# Patient Record
Sex: MALE | Race: WHITE | NOT HISPANIC OR LATINO | Employment: FULL TIME | URBAN - METROPOLITAN AREA
[De-identification: names, ages, dates, MRNs, and addresses within clinical notes are randomized per-mention and may not be internally consistent; named-entity substitution may affect disease eponyms.]

---

## 2017-01-02 ENCOUNTER — TRANSFERRED RECORDS (OUTPATIENT)
Dept: HEALTH INFORMATION MANAGEMENT | Facility: CLINIC | Age: 42
End: 2017-01-02

## 2017-01-02 ENCOUNTER — HOSPITAL ENCOUNTER (OUTPATIENT)
Facility: CLINIC | Age: 42
Setting detail: OBSERVATION
Discharge: HOME | End: 2017-01-03
Attending: PHYSICIAN ASSISTANT | Admitting: FAMILY MEDICINE
Payer: COMMERCIAL

## 2017-01-02 DIAGNOSIS — L03.114 CELLULITIS OF LEFT ELBOW: ICD-10-CM

## 2017-01-02 DIAGNOSIS — M70.22 OLECRANON BURSITIS OF LEFT ELBOW: Primary | ICD-10-CM

## 2017-01-02 LAB
ANION GAP SERPL CALCULATED.3IONS-SCNC: 9 MMOL/L (ref 3–14)
BUN SERPL-MCNC: 12 MG/DL (ref 7–30)
CALCIUM SERPL-MCNC: 8.5 MG/DL (ref 8.5–10.1)
CHLORIDE SERPL-SCNC: 104 MMOL/L (ref 94–109)
CO2 SERPL-SCNC: 28 MMOL/L (ref 20–32)
CREAT SERPL-MCNC: 0.85 MG/DL (ref 0.66–1.25)
CRP SERPL-MCNC: 32.5 MG/L (ref 0–8)
GFR SERPL CREATININE-BSD FRML MDRD: NORMAL ML/MIN/1.7M2
GLUCOSE SERPL-MCNC: 80 MG/DL (ref 70–99)
LACTATE BLD-SCNC: 1.5 MMOL/L (ref 0.7–2.1)
POTASSIUM SERPL-SCNC: 3.6 MMOL/L (ref 3.4–5.3)
SODIUM SERPL-SCNC: 141 MMOL/L (ref 133–144)

## 2017-01-02 PROCEDURE — 25000132 ZZH RX MED GY IP 250 OP 250 PS 637: Performed by: FAMILY MEDICINE

## 2017-01-02 PROCEDURE — 99219 ZZC INITIAL OBSERVATION CARE,LEVL II: CPT | Performed by: FAMILY MEDICINE

## 2017-01-02 PROCEDURE — 96367 TX/PROPH/DG ADDL SEQ IV INF: CPT

## 2017-01-02 PROCEDURE — 87040 BLOOD CULTURE FOR BACTERIA: CPT | Performed by: PHYSICIAN ASSISTANT

## 2017-01-02 PROCEDURE — 99285 EMERGENCY DEPT VISIT HI MDM: CPT | Mod: 25

## 2017-01-02 PROCEDURE — 25000128 H RX IP 250 OP 636: Performed by: PHYSICIAN ASSISTANT

## 2017-01-02 PROCEDURE — 86140 C-REACTIVE PROTEIN: CPT | Performed by: PHYSICIAN ASSISTANT

## 2017-01-02 PROCEDURE — 83605 ASSAY OF LACTIC ACID: CPT | Performed by: PHYSICIAN ASSISTANT

## 2017-01-02 PROCEDURE — 80048 BASIC METABOLIC PNL TOTAL CA: CPT | Performed by: PHYSICIAN ASSISTANT

## 2017-01-02 PROCEDURE — G0378 HOSPITAL OBSERVATION PER HR: HCPCS

## 2017-01-02 PROCEDURE — 96365 THER/PROPH/DIAG IV INF INIT: CPT

## 2017-01-02 PROCEDURE — 25000125 ZZHC RX 250: Performed by: PHYSICIAN ASSISTANT

## 2017-01-02 PROCEDURE — 25000125 ZZHC RX 250: Performed by: FAMILY MEDICINE

## 2017-01-02 PROCEDURE — 36415 COLL VENOUS BLD VENIPUNCTURE: CPT

## 2017-01-02 PROCEDURE — 96376 TX/PRO/DX INJ SAME DRUG ADON: CPT

## 2017-01-02 RX ORDER — HYDROCODONE BITARTRATE AND ACETAMINOPHEN 5; 325 MG/1; MG/1
1-2 TABLET ORAL EVERY 4 HOURS PRN
Status: DISCONTINUED | OUTPATIENT
Start: 2017-01-02 | End: 2017-01-03 | Stop reason: HOSPADM

## 2017-01-02 RX ORDER — ACETAMINOPHEN 325 MG/1
650 TABLET ORAL EVERY 4 HOURS PRN
Status: DISCONTINUED | OUTPATIENT
Start: 2017-01-02 | End: 2017-01-03 | Stop reason: HOSPADM

## 2017-01-02 RX ORDER — NALOXONE HYDROCHLORIDE 0.4 MG/ML
.1-.4 INJECTION, SOLUTION INTRAMUSCULAR; INTRAVENOUS; SUBCUTANEOUS
Status: DISCONTINUED | OUTPATIENT
Start: 2017-01-02 | End: 2017-01-03 | Stop reason: HOSPADM

## 2017-01-02 RX ORDER — IBUPROFEN 600 MG/1
600 TABLET, FILM COATED ORAL 4 TIMES DAILY
Status: DISCONTINUED | OUTPATIENT
Start: 2017-01-02 | End: 2017-01-03 | Stop reason: HOSPADM

## 2017-01-02 RX ORDER — SODIUM CHLORIDE 9 MG/ML
1000 INJECTION, SOLUTION INTRAVENOUS CONTINUOUS
Status: DISCONTINUED | OUTPATIENT
Start: 2017-01-02 | End: 2017-01-03 | Stop reason: HOSPADM

## 2017-01-02 RX ORDER — LIDOCAINE 40 MG/G
CREAM TOPICAL
Status: DISCONTINUED | OUTPATIENT
Start: 2017-01-02 | End: 2017-01-03 | Stop reason: HOSPADM

## 2017-01-02 RX ADMIN — SODIUM CHLORIDE 1000 ML: 9 INJECTION, SOLUTION INTRAVENOUS at 17:26

## 2017-01-02 RX ADMIN — TAZOBACTAM SODIUM AND PIPERACILLIN SODIUM 4.5 G: 500; 4 INJECTION, SOLUTION INTRAVENOUS at 17:26

## 2017-01-02 RX ADMIN — TAZOBACTAM SODIUM AND PIPERACILLIN SODIUM 4.5 G: 500; 4 INJECTION, SOLUTION INTRAVENOUS at 22:01

## 2017-01-02 RX ADMIN — VANCOMYCIN HYDROCHLORIDE 2000 MG: 1 INJECTION, POWDER, LYOPHILIZED, FOR SOLUTION INTRAVENOUS at 18:10

## 2017-01-02 RX ADMIN — IBUPROFEN 600 MG: 600 TABLET ORAL at 19:41

## 2017-01-02 ASSESSMENT — ENCOUNTER SYMPTOMS
FATIGUE: 1
HEADACHES: 1
MYALGIAS: 1
ABDOMINAL PAIN: 0
COLOR CHANGE: 1
CHILLS: 1
ARTHRALGIAS: 1
NAUSEA: 1
VOMITING: 0

## 2017-01-02 NOTE — ED NOTES
Pt presents with left elbow swelling and redness. Started on Friday. No known injury. Sent from [facility] for possible cellulitis. Pt has labs with him.

## 2017-01-02 NOTE — ED PROVIDER NOTES
History     Chief Complaint   Patient presents with     Cellulitis     Possible cellulitis left eobow. Pt sent from [facility]. Chills. Started on Friday.     HPI  Dustin Mccoy is a 41 year old male who resents to the emergency department with left elbow infection.  Thursday afternoon/evening he questions if he hit his elbow while working outside.  He noticed it was very sore and red, and since then has progressively worsened.  He saw his primary care provider today and she sent him here.  He has had body aches, chills, nausea, and headache associated with this.  Has not taken his temperature.    I have reviewed the Medications, Allergies, Past Medical and Surgical History, and Social History in the Epic system.    Review of Systems   Constitutional: Positive for chills and fatigue.   Gastrointestinal: Positive for nausea. Negative for vomiting and abdominal pain.   Musculoskeletal: Positive for myalgias and arthralgias (Left elbow).   Skin: Positive for color change (left elbow redness).   Neurological: Positive for headaches.   All other systems reviewed and are negative.      Physical Exam   BP: (!) 158/118 mmHg  Pulse: 85  Temp: 98.2  F (36.8  C)  Resp: 16  Weight: 102.059 kg (225 lb)  SpO2: 100 %  Physical Exam   Constitutional: He is oriented to person, place, and time. He appears well-developed and well-nourished. He appears ill. No distress.   HENT:   Head: Normocephalic.   Eyes: Conjunctivae are normal.   Cardiovascular: Normal rate, regular rhythm and normal heart sounds.  Exam reveals no gallop and no friction rub.    No murmur heard.  Pulmonary/Chest: Effort normal and breath sounds normal. No respiratory distress. He has no wheezes. He has no rales.   Abdominal: Soft. There is no tenderness.   Musculoskeletal:        Left elbow: He exhibits decreased range of motion (Due to pain) and swelling (Over olecranon bursa).   Neurological: He is alert and oriented to person, place, and time.    Skin: Skin is warm and dry. He is not diaphoretic.   Left elbow: Dark erythema with associated warmth and tenderness spreading from posterior elbow down to mid forearm and proximal onto upper arm.   Psychiatric: He has a normal mood and affect.   Nursing note and vitals reviewed.      ED Course   Procedures  None      Labs Ordered and Resulted from Time of ED Arrival Up to the Time of Departure from the ED   CRP INFLAMMATION - Abnormal; Notable for the following:     CRP Inflammation 32.5 (*)     All other components within normal limits   BASIC METABOLIC PANEL   LACTIC ACID WHOLE BLOOD   PERIPHERAL IV CATHETER       Assessments & Plan (with Medical Decision Making)  Dustin Mccoy is a 41 year old male who presented to the emergency department from the clinic with left elbow redness, swelling, and pain.  This began 4 days ago and has progressively worsened.  He also complains of body aches, chills, and nausea.  He was afebrile. Vitals were within normal limits including no fever.  Exam revealed diffuse erythema and tenderness centralized from posterior elbow down forearm and [facility] up upper arm, consistent with cellulitis.  Olecranon bursa also appeared swollen, possibly due to infection vs trauma from Thursday though patient cannot recall injuring it. Patient had CBC done at his clinic which showed a white count of 8.1. BMP here was unremarkable, lactate was 1.5, and CRP was 32.5.  Blood cultures were drawn and pending. He was started on IV Zosyn and vancomycin to cover for soft tissue infection. Given his systemic symptoms and extent of infection, this patient would benefit from an observation stay for continued IV antibiotics to ensure this is improving.  I spoke with Dr. Zamarripa who accepted patient to his service.  Patient was staffed with Dr. Redd here in the ED.    Plan:   - Admit to hospital on observation     I have reviewed the nursing notes.    I have reviewed the findings, diagnosis, plan and need  for follow up with the patient.      Final diagnoses:   Cellulitis of left elbow       1/2/2017   [facility] EMERGENCY DEPARTMENT      Marion Guillen PA-C  01/03/17 [id number]

## 2017-01-02 NOTE — IP AVS SNAPSHOT
MRN:[id number]                      After Visit Summary   1/2/2017    Dustin Mccoy    MRN: [id number]           Thank you!     Thank you for choosing [facility] for your care. Our goal is always to provide you with excellent care. Hearing back from our patients is one way we can continue to improve our services. Please take a few minutes to complete the written survey that you may receive in the mail after you visit with us. Thank you!        Patient Information     Date Of Birth          1975        About your hospital stay     You were admitted on:  January 2, 2017 You last received care in the:  [address] [facility]    You were discharged on:  January 3, 2017        Reason for your hospital stay       Hospitalized for infection (cellulitis) and bursitis of left elbow and improved                  Who to Call     For medical emergencies, please call 911.  For non-urgent questions about your medical care, please call your primary care provider or clinic, [phone]          Attending Provider     Provider    Marion Guillen PA-C Gould, Wilfred Edwin, MD       Primary Care Provider Office Phone # Fax #    Mamadou Blair [phone]       [facility] [address] [facility] [address]        After Care Instructions     Activity       Your activity upon discharge: activity as tolerated and no work until after follow up with PCP            Diet       Follow this diet upon discharge: Orders Placed This Encounter  Regular Diet Adult                  Follow-up Appointments     Follow-up and recommended labs and tests        Follow up with primary care provider, Mamadou Blair MD, within 3 days, for hospital follow- up and regarding new diagnosis. The following labs/tests are recommended: consider aspiration of left olecranon bursa if swelling and inflammation persist.                  Your next 10 appointments already  "scheduled     2017 11:20 AM   [facility] Jay[facility]    [address] [facility] [address] [phone]           Bring a current list of meds and any records pertaining to this visit.  For Physicals, please bring immunization records and any forms needing to be filled out.  Please arrive 10 minutes early to complete paperwork.              Pending Results     Date and Time Order Name Status Description    2017 1656 Blood culture Preliminary     2017 1656 Blood culture Preliminary             Statement of Approval     Ordered          17 1034  I have reviewed and agree with all the recommendations and orders detailed in this document.   EFFECTIVE NOW     Approved and electronically signed by:  Francisco Garay MD             Admission Information        Provider Department Dept Phone    2017 Jaron Zamarripa MD, MD [facility] [phone]      Your Vitals Were     Blood Pressure Pulse Temperature    121/70 mmHg 73 96.7  F (35.9  C) (Oral)    Respirations Height Weight    20 1.778 m (5' 10\") 102.513 kg (226 lb)    BMI (Body Mass Index) Pulse Oximetry       32.43 kg/m2 97%       MyChart Information     [employer] lets you send messages to your doctor, view your test results, renew your prescriptions, schedule appointments and more. To sign up, go to [email or web address] . Click on \"Log in\" on the left side of the screen, which will take you to the Welcome page. Then click on \"Sign up Now\" on the right side of the page.     You will be asked to enter the access code listed below, as well as some personal information. Please follow the directions to create your username and password.     Your access code is: [email or web address]  Expires: 2017 10:35 AM     Your access code will  in 90 days. If you need help or a new code, please call your [facility] or [phone].        Care EveryWhere ID     " This is your Care EveryWhere ID. This could be used by other organizations to access your [facility] medical records  [id number]           Review of your medicines      START taking        Dose / Directions    acetaminophen 325 MG tablet   Commonly known as:  TYLENOL   Used for:  Olecranon bursitis of left elbow        Dose:  650 mg   Take 2 tablets (650 mg) by mouth every 4 hours as needed for mild pain   Quantity:  100 tablet   Refills:  0       cephALEXin 500 MG capsule   Commonly known as:  KEFLEX   Used for:  Cellulitis of left elbow, Olecranon bursitis of left elbow        Dose:  500 mg   Take 1 capsule (500 mg) by mouth 4 times daily   Quantity:  40 capsule   Refills:  0       doxycycline Monohydrate 100 MG Caps   Used for:  Cellulitis of left elbow        Dose:  100 mg   Take 1 capsule (100 mg) by mouth 2 times daily for 10 days   Quantity:  20 capsule   Refills:  0       ibuprofen 200 MG tablet   Commonly known as:  ADVIL/MOTRIN   Used for:  Olecranon bursitis of left elbow        Dose:  600 mg   Take 3 tablets (600 mg) by mouth 4 times daily for 5 days Then take every 6 hours as needed, take with food   Quantity:  60 tablet   Refills:  0            Where to get your medicines      These medications were sent to [facility] [place] - [address] [facility]  [address] [facility] [address] [address]     Phone:  [phone]    - cephALEXin 500 MG capsule  - doxycycline Monohydrate 100 MG Caps      Some of these will need a paper prescription and others can be bought over the counter. Ask your nurse if you have questions.     You don't need a prescription for these medications    - acetaminophen 325 MG tablet  - ibuprofen 200 MG tablet             Protect others around you: Learn how to safely use, store and throw away your medicines at www.disposemymeds.org.             Medication List: This is a list of all your medications and when to take them. Check marks below indicate your daily home schedule. Keep this list as  a reference.      Medications           Morning Afternoon Evening Bedtime As Needed    acetaminophen 325 MG tablet   Commonly known as:  TYLENOL   Take 2 tablets (650 mg) by mouth every 4 hours as needed for mild pain                                   cephALEXin 500 MG capsule   Commonly known as:  KEFLEX   Take 1 capsule (500 mg) by mouth 4 times daily                                            doxycycline Monohydrate 100 MG Caps   Take 1 capsule (100 mg) by mouth 2 times daily for 10 days                                      ibuprofen 200 MG tablet   Commonly known as:  ADVIL/MOTRIN   Take 3 tablets (600 mg) by mouth 4 times daily for 5 days Then take every 6 hours as needed, take with food   Last time this was given:  600 mg on 1/3/2017  8:02 AM

## 2017-01-02 NOTE — IP AVS SNAPSHOT
[address] [facility]    [address] [facility]     [place] [address]    Phone:  [phone]                                       After Visit Summary   1/2/2017    Dustin Mccoy    MRN: [id number]           After Visit Summary Signature Page     I have received my discharge instructions, and my questions have been answered. I have discussed any challenges I see with this plan with the nurse or doctor.    ..........................................................................................................................................  Patient/Patient Representative Signature      ..........................................................................................................................................  Patient Representative Print Name and Relationship to Patient    ..................................................               ................................................  Date                                            Time    ..........................................................................................................................................  Reviewed by Signature/Title    ...................................................              ..............................................  Date                                                            Time

## 2017-01-03 VITALS
DIASTOLIC BLOOD PRESSURE: 70 MMHG | OXYGEN SATURATION: 97 % | RESPIRATION RATE: 20 BRPM | HEART RATE: 73 BPM | WEIGHT: 226 LBS | BODY MASS INDEX: 32.35 KG/M2 | SYSTOLIC BLOOD PRESSURE: 121 MMHG | TEMPERATURE: 96.7 F | HEIGHT: 70 IN

## 2017-01-03 LAB
ANION GAP SERPL CALCULATED.3IONS-SCNC: 9 MMOL/L (ref 3–14)
BUN SERPL-MCNC: 12 MG/DL (ref 7–30)
CALCIUM SERPL-MCNC: 7.8 MG/DL (ref 8.5–10.1)
CHLORIDE SERPL-SCNC: 109 MMOL/L (ref 94–109)
CO2 SERPL-SCNC: 25 MMOL/L (ref 20–32)
CREAT SERPL-MCNC: 0.91 MG/DL (ref 0.66–1.25)
ERYTHROCYTE [DISTWIDTH] IN BLOOD BY AUTOMATED COUNT: 12.8 % (ref 10–15)
GFR SERPL CREATININE-BSD FRML MDRD: ABNORMAL ML/MIN/1.7M2
GLUCOSE SERPL-MCNC: 84 MG/DL (ref 70–99)
HCT VFR BLD AUTO: 39.4 % (ref 40–53)
HGB BLD-MCNC: 13.2 G/DL (ref 13.3–17.7)
MCH RBC QN AUTO: 30.1 PG (ref 26.5–33)
MCHC RBC AUTO-ENTMCNC: 33.5 G/DL (ref 31.5–36.5)
MCV RBC AUTO: 90 FL (ref 78–100)
PLATELET # BLD AUTO: 209 10E9/L (ref 150–450)
POTASSIUM SERPL-SCNC: 3.8 MMOL/L (ref 3.4–5.3)
RBC # BLD AUTO: 4.39 10E12/L (ref 4.4–5.9)
SODIUM SERPL-SCNC: 143 MMOL/L (ref 133–144)
WBC # BLD AUTO: 7.6 10E9/L (ref 4–11)

## 2017-01-03 PROCEDURE — 80048 BASIC METABOLIC PNL TOTAL CA: CPT | Performed by: FAMILY MEDICINE

## 2017-01-03 PROCEDURE — 25000125 ZZHC RX 250: Performed by: FAMILY MEDICINE

## 2017-01-03 PROCEDURE — 85027 COMPLETE CBC AUTOMATED: CPT | Performed by: FAMILY MEDICINE

## 2017-01-03 PROCEDURE — 25000128 H RX IP 250 OP 636: Performed by: PHYSICIAN ASSISTANT

## 2017-01-03 PROCEDURE — 36415 COLL VENOUS BLD VENIPUNCTURE: CPT | Performed by: FAMILY MEDICINE

## 2017-01-03 PROCEDURE — 25000125 ZZHC RX 250: Performed by: PHYSICIAN ASSISTANT

## 2017-01-03 PROCEDURE — 99217 ZZC OBSERVATION CARE DISCHARGE: CPT | Performed by: PEDIATRICS

## 2017-01-03 PROCEDURE — 96376 TX/PRO/DX INJ SAME DRUG ADON: CPT

## 2017-01-03 PROCEDURE — G0378 HOSPITAL OBSERVATION PER HR: HCPCS

## 2017-01-03 PROCEDURE — 25000132 ZZH RX MED GY IP 250 OP 250 PS 637: Performed by: FAMILY MEDICINE

## 2017-01-03 RX ORDER — CEPHALEXIN 500 MG/1
500 CAPSULE ORAL 4 TIMES DAILY
Qty: 40 CAPSULE | Refills: 0 | Status: SHIPPED | OUTPATIENT
Start: 2017-01-03 | End: 2017-01-17

## 2017-01-03 RX ORDER — ACETAMINOPHEN 325 MG/1
650 TABLET ORAL EVERY 4 HOURS PRN
Qty: 100 TABLET | COMMUNITY
Start: 2017-01-03 | End: 2018-09-28

## 2017-01-03 RX ORDER — DOXYCYCLINE 100 MG/1
100 CAPSULE ORAL 2 TIMES DAILY
Qty: 20 CAPSULE | Refills: 0 | Status: SHIPPED | OUTPATIENT
Start: 2017-01-03 | End: 2017-01-05 | Stop reason: SINTOL

## 2017-01-03 RX ORDER — IBUPROFEN 200 MG
600 TABLET ORAL 4 TIMES DAILY
Qty: 60 TABLET | Refills: 0 | COMMUNITY
Start: 2017-01-03 | End: 2017-01-08

## 2017-01-03 RX ADMIN — VANCOMYCIN HYDROCHLORIDE 2000 MG: 1 INJECTION, POWDER, LYOPHILIZED, FOR SOLUTION INTRAVENOUS at 05:44

## 2017-01-03 RX ADMIN — TAZOBACTAM SODIUM AND PIPERACILLIN SODIUM 4.5 G: 500; 4 INJECTION, SOLUTION INTRAVENOUS at 04:38

## 2017-01-03 RX ADMIN — IBUPROFEN 600 MG: 600 TABLET ORAL at 08:02

## 2017-01-03 RX ADMIN — SODIUM CHLORIDE 1000 ML: 9 INJECTION, SOLUTION INTRAVENOUS at 04:39

## 2017-01-03 NOTE — H&P
[facility]    History and Physical  Hospitalist       Date of Admission:  1/2/2017  Date of Service (when I saw the patient): 01/02/2017    Assessment and Plan  Dustin Mccoy is a 41 year old male who presents with worsening left elbow pain, redness and swelling over the past 3 days. Had bumped it the day prior when he fell to the ice in the next they had pain with difficulty moving the elbow. Now presents with increasing redness and not feeling well with chills sweats general malaise and body aches. There's been no fever. In the emergency department he was afebrile and noted have swine involving the left elbow thought to be a cellulitis. His WBC count was normal as lactic acid was normal at 1.5. He has had no previous history of cellulitis or skin infections and is otherwise in good health. Patient will be registered observation and started on IV antibiotics this evening looking for improvement in symptoms and if better tomorrow, could be potentially discharged home on oral antibiotics. Since is not clear if this is truly all an infectious process, there may be some inflammatory reaction possibly to the trauma, will also treat him with scheduled anti-inflammatory.    Principal Problem:    Cellulitis of left upper extremity  Active Problems:    Olecranon bursitis of left elbow    DVT Prophylaxis: Low Risk/Ambulatory with no VTE prophylaxis indicated  Code Status: Full Code    Disposition: Expected discharge in 1 days once redness and pain better.    Jaron Zamarripa MD    Primary Care Physician  Mamadou Blair MD    Chief Complaint  41-year-old male with redness, pain and swelling involving left elbow    History is obtained from the patient, electronic health record and emergency department provider    History of Present Illness  Dustin Mccoy is a 41 year old male who presents with redness swelling and pain involving his left elbow over the past 3 days. The day  prior to that, last Thursday, he had fallen through the ice near his home hitting his elbow. Had minimal problems at the time, but the next day noticed some pain and decreased range of motion. Since then it has become red, swollen. He is not feeling well with body aches, chills, mild nausea and headaches. There's been no fever. No open sores or drainage. No history of skin infections in the past. Otherwise healthy not taking any medications.    Past Medical History   I have reviewed this patient's medical history and updated it with pertinent information if needed.   Past Medical History   Diagnosis Date     H/O irritable bowel syndrome 12/28/2015     Atypical chest pain 12/28/2015       Past Surgical History  I have reviewed this patient's surgical history and updated it with pertinent information if needed.  Past Surgical History   Procedure Laterality Date     No history of surgery         Prior to Admission Medications  None     Allergies  Allergies   Allergen Reactions     Erythromycin        Social History  I have reviewed this patient's social history and updated it with pertinent information if needed. Dustin Mccoy  reports that he has never smoked. He has never used smokeless tobacco. He reports that he drinks alcohol. He reports that he does not use illicit drugs.    Family History  I have reviewed this patient's family history and updated it with pertinent information if needed.   Family History   Problem Relation Age of Onset     DIABETES Father        Review of Systems  The 10 point Review of Systems is negative other than noted in the HPI or here.     Physical Exam  Temp: 98.2  F (36.8  C)   BP: (!) 158/118 mmHg Pulse: 85   Resp: 16 SpO2: 100 %      Vital Signs with Ranges  Temp:  [98.2  F (36.8  C)] 98.2  F (36.8  C)  Pulse:  [85] 85  Resp:  [16] 16  BP: (158)/(118) 158/118 mmHg  SpO2:  [100 %] 100 %  225 lbs 0 oz    EXAM:  Constitutional: healthy, alert and no distress   Cardiovascular: PMI  normal. No lifts, heaves, or thrills. RRR. No murmurs, clicks gallops or rub  Respiratory: Percussion normal. Good diaphragmatic excursion. Lungs clear  Psychiatric: mentation appears normal and affect normal/bright  Head: Normocephalic. No masses, lesions, tenderness or abnormalities  Neck: Neck supple. No adenopathy. Thyroid symmetric, normal size,  ENT: ENT exam normal, no neck nodes or sinus tenderness  Abdomen: Abdomen soft, non-tender. BS normal. No masses, organomegaly  NEURO: Gait normal. Reflexes normal and symmetric. Sensation grossly WNL.  SKIN: area of redness outlined around his left elbow  LYMPH: Normal cervical lymph nodes  JOINT/EXTREMITIES: left elbow was swollen. Usability to flex to about 100  and has full extension. No pain with rotation. Sensation of the fingers is normal. There is some tenderness over the olecranon process but minimal swelling involving the bursa.     Data  Data reviewed today:  I personally reviewed no images or EKG's today.    Recent Labs  Lab 01/02/17  1710      POTASSIUM 3.6   CHLORIDE 104   CO2 28   BUN 12   CR 0.85   ANIONGAP 9   SELVIN 8.5   GLC 80       No results found for this or any previous visit (from the past 24 hour(s)).

## 2017-01-03 NOTE — PHARMACY-VANCOMYCIN DOSING SERVICE
Dustin Mccoy is a 41 year old male, Vancomycin dose: 2000 mg IV Q12H  Indication: Skin and Soft Tissue Infection  Day of Therapy: 2  Renal Function: Stable  Goal Trough Level: 15-20 mg/L  Plan: Continue Current Dose. Discharging today.  Will continue to follow daily and check levels as appropriate in 1-3 Days.  Ed Stratton RPH

## 2017-01-03 NOTE — PLAN OF CARE
Problem: Goal Outcome Summary  Goal: Goal Outcome Summary  Outcome: Improving  VSS. Afebrile. Pt c/o some discomfort in L elbow but chooses not to take pain medication. Red area remains to L elbow remaining in previously outlined area. No other complaints.

## 2017-01-03 NOTE — DISCHARGE SUMMARY
[facility] Observation Discharge Summary    Dustin Mccoy MRN# [id number]   Age: 41 year old YOB: 1975     Date of Observation:  1/2/2017  Date of Discharge:  1/3/2017   Initial Physician:  Jaron Zamarripa MD  Discharge Physician:  Francisco Garay MD     Home clinic: [facility]  Primary care provider: Mamadou Blair          Admission Diagnoses:   Cellulitis of left elbow [L03.114]          Discharge Diagnoses:   Principal diagnosis: Cellulitis of left upper extremity    Secondary diagnoses:    Cellulitis of left upper extremity    Olecranon bursitis of left elbow    Cellulitis of left elbow    * No resolved hospital problems. *            Procedures:   No procedures performed during this hospital stay             Discharge Medications:     Outpatient Prescriptions Marked as Taking for the 1/2/17 encounter (Hospital Encounter)   Medication Sig     ibuprofen (ADVIL/MOTRIN) 200 MG tablet Take 3 tablets (600 mg) by mouth 4 times daily for 5 days Then take every 6 hours as needed, take with food     acetaminophen (TYLENOL) 325 MG tablet Take 2 tablets (650 mg) by mouth every 4 hours as needed for mild pain     cephALEXin (KEFLEX) 500 MG capsule Take 1 capsule (500 mg) by mouth 4 times daily     doxycycline Monohydrate 100 MG CAPS Take 1 capsule (100 mg) by mouth 2 times daily for 10 days       Current Discharge Medication List      START taking these medications    Details   ibuprofen (ADVIL/MOTRIN) 200 MG tablet Take 3 tablets (600 mg) by mouth 4 times daily for 5 days Then take every 6 hours as needed, take with food  Qty: 60 tablet, Refills: 0    Associated Diagnoses: Olecranon bursitis of left elbow      acetaminophen (TYLENOL) 325 MG tablet Take 2 tablets (650 mg) by mouth every 4 hours as needed for mild pain  Qty: 100 tablet    Associated Diagnoses: Olecranon bursitis of left elbow      cephALEXin (KEFLEX) 500 MG capsule Take 1 capsule (500 mg) by mouth 4 times  daily  Qty: 40 capsule, Refills: 0    Associated Diagnoses: Cellulitis of left elbow; Olecranon bursitis of left elbow      doxycycline Monohydrate 100 MG CAPS Take 1 capsule (100 mg) by mouth 2 times daily for 10 days  Qty: 20 capsule, Refills: 0    Associated Diagnoses: Cellulitis of left elbow                   Consultations:   No consultations were requested during this hospital stay            Brief History of Illness:   41 year old male generally healthy patient presented with 3 days history of worsening pain, swelling, and redness in his left arm around the elbow.  Because of concern for rapidly progressive soft tissue infection, he was hospitalized for observation. See ER note and history and physical for details.          Hospital Course:   Patient was observed in the hospital.  Blood cultures were negative on preliminary results.  He did not have fever or leukocytosis.  He was treated empirically with IV vancomycin and Zosyn.  Signs of cellulitis rapidly improved.  Pain was controlled with ibuprofen.  He tolerated advancing diet and activity without difficulty.  He had an otherwise unremarkable clinical course.  After investigation, cellulitis of the left arm was suspected.  He also appeared to have an early or mild left olecranon bursitis, but it remained unclear whether bursitis was reactive to his cellulitis or whether he could possibly have an infectious bursitis.    I evaluated this patient on the day of discharge.  He remained afebrile with stable vital signs.  There was lingering erythema localized over about 2 cm of the left olecranon bursa which was tender and slightly fluctuant.  There was pink discoloration on the ulnar aspect of the proximal left forearm but no significant forearm erythema, warmth, or tenderness.            Discharge Instructions and Follow-Up:   Discharge diet: Regular   Discharge activity: Activity as tolerated  No work until follow-up with primary care provider    Discharge  follow-up: Follow up with primary care provider within 3 days      Pending test results:   Unresulted Labs Ordered in the Past 30 Days of this Admission     Date and Time Order Name Status Description    1/2/2017 1656 Blood culture Preliminary     1/2/2017 1656 Blood culture Preliminary                Discharge Disposition:   Discharged to home      Attestation:  I have reviewed today's vital signs, notes, medications, and test results.    Francisco Garay MD

## 2017-01-04 ENCOUNTER — TELEPHONE (OUTPATIENT)
Dept: FAMILY MEDICINE | Facility: OTHER | Age: 42
End: 2017-01-04

## 2017-01-04 NOTE — TELEPHONE ENCOUNTER
ED / Discharge Outreach Protocol    Patient Contact    Attempt # 1    Was call answered?  No.  Left message on voicemail with information to call me back.    Kim Jeffries RN, BSN

## 2017-01-04 NOTE — TELEPHONE ENCOUNTER
Type of Visit  INPATIENT  Diagnosis/Reason for Visit  Cellulitis Of Left Elbow, Olecranon Bursitis Of Left Elbow  Date of Visit  01-  # of ED Visits in past year  0      Please call patient for follow up.

## 2017-01-05 ENCOUNTER — OFFICE VISIT (OUTPATIENT)
Dept: FAMILY MEDICINE | Facility: OTHER | Age: 42
End: 2017-01-05
Payer: COMMERCIAL

## 2017-01-05 VITALS
WEIGHT: 226.6 LBS | HEART RATE: 76 BPM | SYSTOLIC BLOOD PRESSURE: 116 MMHG | RESPIRATION RATE: 16 BRPM | BODY MASS INDEX: 32.51 KG/M2 | DIASTOLIC BLOOD PRESSURE: 70 MMHG | TEMPERATURE: 96.6 F

## 2017-01-05 DIAGNOSIS — L03.114 CELLULITIS OF LEFT ELBOW: ICD-10-CM

## 2017-01-05 DIAGNOSIS — M70.22 OLECRANON BURSITIS OF LEFT ELBOW: Primary | ICD-10-CM

## 2017-01-05 DIAGNOSIS — L03.114 CELLULITIS OF LEFT UPPER EXTREMITY: ICD-10-CM

## 2017-01-05 DIAGNOSIS — Z87.19 H/O IRRITABLE BOWEL SYNDROME: ICD-10-CM

## 2017-01-05 PROCEDURE — 99213 OFFICE O/P EST LOW 20 MIN: CPT | Performed by: PHYSICIAN ASSISTANT

## 2017-01-05 ASSESSMENT — PAIN SCALES - GENERAL: PAINLEVEL: MODERATE PAIN (5)

## 2017-01-05 NOTE — TELEPHONE ENCOUNTER
Patient is in clinic today being seen for his post hospital follow up appointment.     Closing encounter.     Shanel Cruz RN  [facility]

## 2017-01-05 NOTE — NURSING NOTE
"Chief Complaint   Patient presents with     Hospital F/U       Initial /70 mmHg  Pulse 76  Temp(Src) 96.6  F (35.9  C) (Oral)  Resp 16  Wt 226 lb 9.6 oz (102.785 kg) Estimated body mass index is 32.51 kg/(m^2) as calculated from the following:    Height as of 1/2/17: 5' 10\" (1.778 m).    Weight as of this encounter: 226 lb 9.6 oz (102.785 kg).  BP completed using cuff size: scarlett LOVE LPN      "

## 2017-01-05 NOTE — PROGRESS NOTES
SUBJECTIVE:                                                    Dustin Mccoy is a 41 year old male who presents to clinic today for the following health issues:    Hospital Follow-up Visit:  Hospital/Nursing Home/IP Rehab Facility: [facility]  Date of Admission: 1/2/2017  Date of Discharge: 1/3/2017  Reason(s) for Admission: left elbow cellulitis            Problems taking medications regularly:  None       Medication changes since discharge: None       Problems adhering to non-medication therapy:  None    Summary of hospitalization:  [facility] discharge summary reviewed  Diagnostic Tests/Treatments reviewed.  Follow up needed: none  Other Healthcare Providers Involved in Patient s Care:         None  Update since discharge: improved.     Post Discharge Medication Reconciliation: discharge medications reconciled and changed, per note/orders (see AVS).  Plan of care communicated with patient     Coding guidelines for this visit:  Type of Medical   Decision Making Face-to-Face Visit       within 7 Days of discharge Face-to-Face Visit        within 14 days of discharge   Moderate Complexity [id number]   High Complexity [id number]          Problem list and histories reviewed & adjusted, as indicated.  Additional history: as documented    Patient Active Problem List   Diagnosis     H/O irritable bowel syndrome     Atypical chest pain     Olecranon bursitis of left elbow     Cellulitis of left upper extremity     Cellulitis of left elbow     Past Surgical History   Procedure Laterality Date     No history of surgery         Social History   Substance Use Topics     Smoking status: Never Smoker      Smokeless tobacco: Never Used     Alcohol Use: Yes      Comment: occasional     Family History   Problem Relation Age of Onset     DIABETES Father            ROS:  Constitutional, HEENT, cardiovascular, pulmonary, gi and gu systems are negative, except as otherwise noted.    OBJECTIVE:                                                     /70 mmHg  Pulse 76  Temp(Src) 96.6  F (35.9  C) (Oral)  Resp 16  Wt 226 lb 9.6 oz (102.785 kg)  Body mass index is 32.51 kg/(m^2).  GENERAL: healthy, alert and no distress  RESP: lungs clear to auscultation - no rales, rhonchi or wheezes  CV: regular rate and rhythm, normal S1 S2, no S3 or S4, no murmur, click or rub, no peripheral edema and peripheral pulses strong  ABDOMEN: soft, nontender, no hepatosplenomegaly, no masses and bowel sounds normal  MS: mild swelling over the olecranon process but near full ROM to the left elbow  SKIN: minimal erythema and no real induration noted over the cellulitis site.  Minimal redness of of the indicated area that circumscribed his cellulitis.  NEURO: Normal strength and tone, mentation intact and speech normal  PSYCH: mentation appears normal, affect normal/bright    Diagnostic Test Results:  No results found for this or any previous visit (from the past 24 hour(s)).     ASSESSMENT/PLAN:                                                    Dustin was seen today for hospital f/u.    Diagnoses and all orders for this visit:    Olecranon bursitis of left elbow    Cellulitis of left upper extremity    Cellulitis of left elbow    H/O irritable bowel syndrome    ROV if FMLA paperwork needs to be done.  Stop doxycycline due to gastric upset.  Continue Keflex.  Note for work restrictions given.  Jayme Adkins PA-C  [facility]

## 2017-01-05 NOTE — TELEPHONE ENCOUNTER
ED / Discharge Outreach Protocol    Patient Contact    Attempt # 2    Was call answered?  No.  Unable to leave message.    Shanel Cruz RN  [facility]

## 2017-01-05 NOTE — Clinical Note
[facility]  [address] [facility] [address]  Phone: [phone]    January 5, 2017        Dustin Mccoy  [address] [facility] [address]          To whom it may concern:    RE: Dustin Mccoy    Patient was seen and treated today at our clinic and missed work.  Patient may return to work January 11, 2017  with the following:  Light duty-needs to avoid risk to the left elbow for at least till the 18th of Jan.    Please contact me for questions or concerns.      Sincerely,        Jayme Adkins PA-C

## 2017-01-08 LAB
BACTERIA SPEC CULT: NORMAL
BACTERIA SPEC CULT: NORMAL
Lab: NORMAL
Lab: NORMAL
MICRO REPORT STATUS: NORMAL
MICRO REPORT STATUS: NORMAL
SPECIMEN SOURCE: NORMAL
SPECIMEN SOURCE: NORMAL

## 2017-01-09 ENCOUNTER — OFFICE VISIT (OUTPATIENT)
Dept: FAMILY MEDICINE | Facility: OTHER | Age: 42
End: 2017-01-09
Payer: COMMERCIAL

## 2017-01-09 VITALS
TEMPERATURE: 97.3 F | BODY MASS INDEX: 33.02 KG/M2 | WEIGHT: 230.1 LBS | DIASTOLIC BLOOD PRESSURE: 78 MMHG | SYSTOLIC BLOOD PRESSURE: 122 MMHG | RESPIRATION RATE: 16 BRPM | HEART RATE: 72 BPM

## 2017-01-09 DIAGNOSIS — M70.22 OLECRANON BURSITIS OF LEFT ELBOW: Primary | ICD-10-CM

## 2017-01-09 DIAGNOSIS — L03.114 CELLULITIS OF LEFT ELBOW: ICD-10-CM

## 2017-01-09 DIAGNOSIS — L03.114 CELLULITIS OF LEFT UPPER EXTREMITY: ICD-10-CM

## 2017-01-09 PROCEDURE — 99213 OFFICE O/P EST LOW 20 MIN: CPT | Performed by: PHYSICIAN ASSISTANT

## 2017-01-09 ASSESSMENT — PAIN SCALES - GENERAL: PAINLEVEL: MODERATE PAIN (4)

## 2017-01-09 NOTE — PROGRESS NOTES
SUBJECTIVE:                                                    Dustin Mccoy is a 41 year old male who presents to clinic today for the following health issues:      Concern - FMLA form     Onset: left elbow injury     Description:   Left olecranon bursitis and related cellulitis    Intensity: slowly resolving    Progression of Symptoms:  improving    Accompanying Signs & Symptoms:  Mild swelling at the site       Previous history of similar problem:   denies    Precipitating factors:   Worsened by: bumping or resting his elbow on furniture    Alleviating factors:  Improved by: careful guarding.       Therapies Tried and outcome: medications as listed    Problem list and histories reviewed & adjusted, as indicated.  Additional history: as documented    Patient Active Problem List   Diagnosis     H/O irritable bowel syndrome     Atypical chest pain     Olecranon bursitis of left elbow     Cellulitis of left upper extremity     Cellulitis of left elbow     Past Surgical History   Procedure Laterality Date     No history of surgery         Social History   Substance Use Topics     Smoking status: Never Smoker      Smokeless tobacco: Never Used     Alcohol Use: Yes      Comment: occasional     Family History   Problem Relation Age of Onset     DIABETES Father            ROS:  Constitutional, HEENT, cardiovascular, pulmonary, gi and gu systems are negative, except as otherwise noted.    OBJECTIVE:                                                    /78 mmHg  Pulse 72  Temp(Src) 97.3  F (36.3  C) (Oral)  Resp 16  Wt 230 lb 1.6 oz (104.373 kg)  Body mass index is 33.02 kg/(m^2).  GENERAL: healthy, alert and no distress  MS: no gross musculoskeletal defects noted, minimal swelling at the site, ROM is not to full extension but improved.  SKIN: no suspicious lesions or rashes  NEURO: Normal strength and tone, mentation intact and speech normal  PSYCH: mentation appears normal, affect normal/bright    Diagnostic  Test Results:  none      ASSESSMENT/PLAN:                                                    Dustin was seen today for forms.    Diagnoses and all orders for this visit:    Olecranon bursitis of left elbow  -     ORTHOPEDICS ADULT REFERRAL    Cellulitis of left upper extremity  -     ORTHOPEDICS ADULT REFERRAL    Cellulitis of left elbow  -     ORTHOPEDICS ADULT REFERRAL    FMLA paperwork completed and will be scanned into chart.  Jayme Adkins PA-C  [facility]

## 2017-01-09 NOTE — NURSING NOTE
"Chief Complaint   Patient presents with     Forms     FMLA       Initial /78 mmHg  Pulse 72  Temp(Src) 97.3  F (36.3  C) (Oral)  Resp 16  Wt 230 lb 1.6 oz (104.373 kg) Estimated body mass index is 33.02 kg/(m^2) as calculated from the following:    Height as of 1/2/17: 5' 10\" (1.778 m).    Weight as of this encounter: 230 lb 1.6 oz (104.373 kg).  BP completed using cuff size: scarlett LOVE LPN      "

## 2017-01-09 NOTE — MR AVS SNAPSHOT
After Visit Summary   1/9/2017    Dustin Mccoy    MRN: [id number]           Patient Information     Date Of Birth          1975        Visit Information        Provider Department      1/9/2017 2:20 PM Jayme Chavez PA-C [facility]        Today's Diagnoses     Olecranon bursitis of left elbow    -  1     Cellulitis of left upper extremity         Cellulitis of left elbow            Follow-ups after your visit        Additional Services     ORTHOPEDICS ADULT REFERRAL       Your provider has referred you to: FMG: [facility] [place] [phone]   [email or web address]/  FMG: [facility] [phone]   [email or web address]  FMG: [facility] [place] [phone]   [email or web address].[facility]/[place]/  FMG: [facility] [place]  [phone]   [email or web address]/[email or web address]/    Please be aware that coverage of these services is subject to the terms and limitations of your health insurance plan.  Call member services at your health plan with any benefit or coverage questions.      Please bring the following to your appointment:    >>   Any x-rays, CTs or MRIs which have been performed.  Contact the facility where they were done to arrange for  prior to your scheduled appointment.    >>   List of current medications   >>   This referral request   >>   Any documents/labs given to you for this referral                  Who to contact     If you have questions or need follow up information about today's clinic visit or your schedule please contact [facility] directly at [phone].  Normal or non-critical lab and imaging results will be communicated to you by MyChart, letter or phone within 4 business days after the clinic has received the  "results. If you do not hear from us within 7 days, please contact the clinic through [facility] or phone. If you have a critical or abnormal lab result, we will notify you by phone as soon as possible.  Submit refill requests through [facility] [facility] or call your pharmacy and they will forward the refill request to us. Please allow 3 business days for your refill to be completed.          Additional Information About Your Visit        [employer] Information     [facility] lets you send messages to your doctor, view your test results, renew your prescriptions, schedule appointments and more. To sign up, go to [email or web address] . Click on \"Log in\" on the left side of the screen, which will take you to the Welcome page. Then click on \"Sign up Now\" on the right side of the page.     You will be asked to enter the access code listed below, as well as some personal information. Please follow the directions to create your username and password.     Your access code is: [email or web address]  Expires: 2017 10:35 AM     Your access code will  in 90 days. If you need help or a new code, please call your [place] clinic or [phone].        Care EveryWhere ID     This is your Care EveryWhere ID. This could be used by other organizations to access your [place] medical records  [id number]        Your Vitals Were     Pulse Temperature Respirations             72 97.3  F (36.3  C) (Oral) 16          Blood Pressure from Last 3 Encounters:   17 122/78   17 116/70   17 121/70    Weight from Last 3 Encounters:   17 230 lb 1.6 oz (104.373 kg)   17 226 lb 9.6 oz (102.785 kg)   17 226 lb (102.513 kg)              We Performed the Following     ORTHOPEDICS ADULT REFERRAL        Primary Care Provider Office Phone # Fax #    Mamadou Blair [phone]       [facility] [address] [facility] [address]        Thank you!     Thank you for choosing [facility] " [facility]  for your care. Our goal is always to provide you with excellent care. Hearing back from our patients is one way we can continue to improve our services. Please take a few minutes to complete the written survey that you may receive in the mail after your visit with us. Thank you!             Your Updated Medication List - Protect others around you: Learn how to safely use, store and throw away your medicines at www.disposemymeds.org.          This list is accurate as of: 1/9/17  2:39 PM.  Always use your most recent med list.                   Brand Name Dispense Instructions for use    acetaminophen 325 MG tablet    TYLENOL    100 tablet    Take 2 tablets (650 mg) by mouth every 4 hours as needed for mild pain       cephALEXin 500 MG capsule    KEFLEX    40 capsule    Take 1 capsule (500 mg) by mouth 4 times daily       DOXYCYCLINE HYCLATE PO      1 tablet twice daily

## 2017-01-17 ENCOUNTER — OFFICE VISIT (OUTPATIENT)
Dept: ORTHOPEDICS | Facility: CLINIC | Age: 42
End: 2017-01-17
Attending: PHYSICIAN ASSISTANT
Payer: COMMERCIAL

## 2017-01-17 VITALS — BODY MASS INDEX: 31.15 KG/M2 | HEIGHT: 72 IN | WEIGHT: 230 LBS | TEMPERATURE: 97.8 F

## 2017-01-17 DIAGNOSIS — L03.114 CELLULITIS OF LEFT ELBOW: ICD-10-CM

## 2017-01-17 DIAGNOSIS — M70.22 OLECRANON BURSITIS OF LEFT ELBOW: Primary | ICD-10-CM

## 2017-01-17 PROCEDURE — 99202 OFFICE O/P NEW SF 15 MIN: CPT | Performed by: ORTHOPAEDIC SURGERY

## 2017-01-17 ASSESSMENT — PAIN SCALES - GENERAL: PAINLEVEL: NO PAIN (0)

## 2017-01-17 NOTE — PROGRESS NOTES
ORTHOPEDIC CLINIC CONSULT      Dustin Mccoy is a 41 year old male who is seen in consultation at the request of Jayme Chavez.    History of Present illness:    Dustin presents for evaluation of:     1.) Left elbow Cellulitis      Onset:  1/2/17.  ( approx 2 weeks)    Was hospitalized overnight for IV antibiotic.  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 He has been off antibiotics approximately 3-4 days    Symptoms brought on by:  unknown     Location:  Left elbow.      Character:  Sharp if bumped and dull ache occasionally.      Progression of symptoms:  better.      Previous similar pain: no .     Pain Level:  0/10.     Previous treatments:  rest/inactivity and Antibiotics.    Currently on Blood thinners? No    Diagnosis of Diabetes? No      Past Medical History   Diagnosis Date     H/O irritable bowel syndrome 12/28/2015     Atypical chest pain 12/28/2015       Past Surgical History   Procedure Laterality Date     No history of surgery         Home Medications:  Prior to Admission medications    Medication Sig Start Date End Date Taking? Authorizing Provider   acetaminophen (TYLENOL) 325 MG tablet Take 2 tablets (650 mg) by mouth every 4 hours as needed for mild pain 1/3/17   Francisco Garay MD       Allergies   Allergen Reactions     Bactrim [Sulfamethoxazole W/Trimethoprim]      Erythromycin      Sulfa Drugs        Social History     Occupational History     Not on file.     Social History Main Topics     Smoking status: Never Smoker      Smokeless tobacco: Never Used     Alcohol Use: Yes      Comment: occasional     Drug Use: No     Sexual Activity:     Partners: Female     Birth Control/ Protection: Condom       Family History   Problem Relation Age of Onset     DIABETES Father        REVIEW OF SYSTEMS  General: negative for fever or fatigue  Psych:  No anxiety or depression    Resp: No shortness of breath and no cough  Ophthalmic:  Corrective lenses?  no  ENT:  Hearing difficulty? no  CV: negative for chest pain, venous insuffiency  Endocrine:  No diabetes   Resp:  Normal respiratory effort  Skin:  Negative for cuts.   and red over the left elbow  Musculoskeletal: as above  Neurologic: positive for numbness/tingling only at night and it's the leftarm  Hematologic: negative for bleeding disorder, no use of anticoagulants     Physical Exam:  Vitals: Temp(Src) 97.8  F (36.6  C) (Temporal)  Ht 1.829 m (6')  Wt 104.327 kg (230 lb)  BMI 31.19 kg/m2  BMI= Body mass index is 31.19 kg/(m^2).  Constitutional: healthy, alert and no acute distress   Psychiatric: mentation appears normal and affect normal/bright  NEURO: no focal deficits  SKIN: no excoriation. No overt signs of infection. He area over the olecranon bursa is pink in color and appears irritated only.  JOINT/EXTREMITIES: left Elbow Exam: Inspection: olecranon bursa swelling  Tender: olecranon bursa, very small amount  fluid underneath the skin.  Surrounding skin around the olecranon bursa is normal appearance  Range of Motion: patient maintains full range of motion    Impression:      ICD-10-CM    1. Olecranon bursitis of left elbow M70.22    2. Cellulitis of left elbow L03.114      The cellulitis patient had been experiencing has decreased considerably.  Patient does have residual olecranon bursitis present.  Patient has been off antibiotics for approximately 3-4 days    Plan:  Patient is advised of the above.  He is cautioned not to place any weight or pressure on his left elbow  Patient was given a note to return to normal duties  We will have patient follow-up in 1 week for reevaluation    Patient is evaluated with and plan developed in conjunction with Dr. Deshpande    Scribed by  Kristel Jhaveri PA-C   1/17/2017  9:41 AM        I attest I have seen and evaluated the patient.  I agree with above impression and plan.    Manuel  RADHA Deshpande MD

## 2017-01-17 NOTE — MR AVS SNAPSHOT
"              After Visit Summary   2017    Dustin Mccoy    MRN: [id number]           Patient Information     Date Of Birth          1975        Visit Information        Provider Department      2017 9:10 AM Manuel Deshpande MD [facility]         Follow-ups after your visit        Who to contact     If you have questions or need follow up information about today's clinic visit or your schedule please contact [facility] directly at [phone].  Normal or non-critical lab and imaging results will be communicated to you by MyChart, letter or phone within 4 business days after the clinic has received the results. If you do not hear from us within 7 days, please contact the clinic through [email or web address] or phone. If you have a critical or abnormal lab result, we will notify you by phone as soon as possible.  Submit refill requests through [employer] or call your pharmacy and they will forward the refill request to us. Please allow 3 business days for your refill to be completed.          Additional Information About Your Visit        [facility] Information     [employer] lets you send messages to your doctor, view your test results, renew your prescriptions, schedule appointments and more. To sign up, go to [email or web address] . Click on \"Log in\" on the left side of the screen, which will take you to the Welcome page. Then click on \"Sign up Now\" on the right side of the page.     You will be asked to enter the access code listed below, as well as some personal information. Please follow the directions to create your username and password.     Your access code is: [email or web address]  Expires: 2017 10:35 AM     Your access code will  in 90 days. If you need help or a new code, please call your [facility] or [phone].        Care EveryWhere ID     This is your Care EveryWhere ID. This could be used by other organizations to access your [place] medical " records  [id number]        Your Vitals Were     Temperature Height BMI (Body Mass Index)             97.8  F (36.6  C) (Temporal) 1.829 m (6') 31.19 kg/m2          Blood Pressure from Last 3 Encounters:   01/09/17 122/78   01/05/17 116/70   01/03/17 121/70    Weight from Last 3 Encounters:   01/17/17 104.327 kg (230 lb)   01/09/17 104.373 kg (230 lb 1.6 oz)   01/05/17 102.785 kg (226 lb 9.6 oz)              Today, you had the following     No orders found for display       Primary Care Provider Office Phone # Fax #    Mamadou Blair [phone]       [place] [address] [facility] [address]        Thank you!     Thank you for choosing [facility]  for your care. Our goal is always to provide you with excellent care. Hearing back from our patients is one way we can continue to improve our services. Please take a few minutes to complete the written survey that you may receive in the mail after your visit with us. Thank you!             Your Updated Medication List - Protect others around you: Learn how to safely use, store and throw away your medicines at www.disposemymeds.org.          This list is accurate as of: 1/17/17  9:16 AM.  Always use your most recent med list.                   Brand Name Dispense Instructions for use    acetaminophen 325 MG tablet    TYLENOL    100 tablet    Take 2 tablets (650 mg) by mouth every 4 hours as needed for mild pain

## 2017-01-17 NOTE — Clinical Note
[address] [address]  [phone]          January 17, 2017    RE:  Dustin Mccoy                                                                                                                                                       [address] [facility] [address]            To whom it may concern:    Dustin Mccoy is under my professional care for left elbow infection.  Patient may return to work without restriction.       Sincerely,        Manuel Deshpande MD

## 2017-01-17 NOTE — NURSING NOTE
Chief Complaint   Patient presents with     Consult     Left elbow bursitis.       Initial Temp(Src) 97.8  F (36.6  C) (Temporal)  Ht 1.829 m (6')  Wt 104.327 kg (230 lb)  BMI 31.19 kg/m2 Estimated body mass index is 31.19 kg/(m^2) as calculated from the following:    Height as of this encounter: 1.829 m (6').    Weight as of this encounter: 104.327 kg (230 lb).  BP completed using cuff size: NA (Not Taken)  CARSON Shi

## 2017-09-06 ENCOUNTER — OFFICE VISIT (OUTPATIENT)
Dept: FAMILY MEDICINE | Facility: CLINIC | Age: 42
End: 2017-09-06
Payer: COMMERCIAL

## 2017-09-06 VITALS
BODY MASS INDEX: 32.03 KG/M2 | SYSTOLIC BLOOD PRESSURE: 136 MMHG | TEMPERATURE: 96.7 F | HEART RATE: 80 BPM | WEIGHT: 236.2 LBS | DIASTOLIC BLOOD PRESSURE: 86 MMHG

## 2017-09-06 DIAGNOSIS — J01.90 ACUTE SINUSITIS WITH SYMPTOMS > 10 DAYS: Primary | ICD-10-CM

## 2017-09-06 PROCEDURE — 99213 OFFICE O/P EST LOW 20 MIN: CPT | Performed by: NURSE PRACTITIONER

## 2017-09-06 RX ORDER — MONTELUKAST SODIUM 4 MG/1
1 TABLET, CHEWABLE ORAL
COMMUNITY
Start: 2017-06-07 | End: 2018-09-28

## 2017-09-06 NOTE — NURSING NOTE
Chief Complaint   Patient presents with     URI       Initial There were no vitals taken for this visit. Estimated body mass index is 31.19 kg/(m^2) as calculated from the following:    Height as of 1/17/17: 6' (1.829 m).    Weight as of 1/17/17: 230 lb (104.3 kg).  Medication Reconciliation: complete

## 2017-09-06 NOTE — PROGRESS NOTES
SUBJECTIVE:   Dustin Mccoy is a 42 year old male who presents to clinic today for the following health issues:      Acute Illness   Acute illness concerns: uri  Onset: about 1 1/2 weeks    Fever: no    Chills/Sweats: no    Headache (location?): YES    Sinus Pressure:YES    Conjunctivitis:  no    Ear Pain: YES: bilateral    Rhinorrhea: YES    Congestion: YES    Sore Throat: YES     Cough: YES-productive of yellow sputum, productive of green sputum    Wheeze: YES    Decreased Appetite: YES    Nausea: no    Vomiting: no    Diarrhea:  no    Dysuria/Freq.: no    Fatigue/Achiness: YES    Sick/Strep Exposure: no     Therapies Tried and outcome: dayquil, nyquil, tyenol not helping      Patient reports the above symptoms which began about a week and a half ago. States he was getting better, then symptoms seemed to worsen. He has a frontal headache, occipital headache, facial pain and pressure. Copious amounts of thick colored drainage and cough.      Problem list and histories reviewed & adjusted, as indicated.  Additional history: as documented    BP Readings from Last 3 Encounters:   09/06/17 136/86   01/09/17 122/78   01/05/17 116/70    Wt Readings from Last 3 Encounters:   09/06/17 236 lb 3.2 oz (107.1 kg)   01/17/17 230 lb (104.3 kg)   01/09/17 230 lb 1.6 oz (104.4 kg)                      Reviewed and updated as needed this visit by clinical staffTobacco  Allergies  Meds  Med Hx  Surg Hx  Fam Hx  Soc Hx      Reviewed and updated as needed this visit by Provider         ROS:  Constitutional, HEENT, cardiovascular, pulmonary, gi and gu systems are negative, except as otherwise noted.      OBJECTIVE:   /86  Pulse 80  Temp 96.7  F (35.9  C) (Tympanic)  Wt 236 lb 3.2 oz (107.1 kg)  BMI 32.03 kg/m2  Body mass index is 32.03 kg/(m^2).   GENERAL: Nutrition and hydration status appeared normal. Patient appears unwell, but no acute distress  EYES: Eyes grossly normal to inspection, PERRL and conjunctivae  and sclerae normal  HENT: Ear canals are clear. Right TM is pearly gray, left TM is a hazy gray and dull. Oropharynx unremarkable. Tender over the rectal and maxillary sinuses bilaterally  NECK: no adenopathy, no asymmetry, masses, or scars and thyroid normal to palpation  RESP: lungs clear to auscultation - no rales, rhonchi or wheezes  CV: regular rates and rhythm, normal S1 S2, no S3 or S4 and no murmur, click or rub  ABDOMEN: soft, nontender, no hepatosplenomegaly, no masses and bowel sounds normal         ASSESSMENT/PLAN:     Problem List Items Addressed This Visit     None      Visit Diagnoses     Acute sinusitis with symptoms > 10 days    -  Primary    Relevant Medications    amoxicillin-clavulanate (AUGMENTIN) 875-125 MG per tablet         Augmentin 875 mg b.i.d. for 10 days  Warm moist packs to face, saline nasal spray, increase oral fluids  Return to clinic if not improved in a week to 10 days    THANH Lowry CNP  [facility]

## 2017-09-06 NOTE — LETTER
[address] [address]  Phone: [phone]  Fax: [phone]    September 6, 2017        Dustin Mccoy  [address] [facility] [address]          To whom it may concern:    RE: Dustin Mccoy    Patient was seen and treated today at our clinic and missed work.  Patient may return to work 9/7 with the following:  No restrictions    Please contact me for questions or concerns.      Sincerely,        THANH Lowry CNP

## 2017-09-06 NOTE — MR AVS SNAPSHOT
"              After Visit Summary   2017    Dustin Mccoy    MRN: [id number]           Patient Information     Date Of Birth          1975        Visit Information        Provider Department      2017 7:45 AM Jayleen Hendricks APRN CNP [facility]        Today's Diagnoses     Acute sinusitis with symptoms > 10 days    -  1       Follow-ups after your visit        Who to contact     If you have questions or need follow up information about today's clinic visit or your schedule please contact [facility] directly at [phone].  Normal or non-critical lab and imaging results will be communicated to you by First[employer], letter or phone within 4 business days after the clinic has received the results. If you do not hear from us within 7 days, please contact the clinic through Firstmoniehart or phone. If you have a critical or abnormal lab result, we will notify you by phone as soon as possible.  Submit refill requests through [employer] or call your pharmacy and they will forward the refill request to us. Please allow 3 business days for your refill to be completed.          Additional Information About Your Visit        MyChart Information     [employer] lets you send messages to your doctor, view your test results, renew your prescriptions, schedule appointments and more. To sign up, go to [email or web address] . Click on \"Log in\" on the left side of the screen, which will take you to the Welcome page. Then click on \"Sign up Now\" on the right side of the page.     You will be asked to enter the access code listed below, as well as some personal information. Please follow the directions to create your username and password.     Your access code is: [email or web address]  Expires: 2017  8:14 AM     Your access code will  in 90 days. If you need help or a new code, please call your [facility] or [phone].        Care EveryWhere ID     This is your Care EveryWhere ID. This " could be used by other organizations to access your [place] medical records  [id number]        Your Vitals Were     Pulse Temperature BMI (Body Mass Index)             80 96.7  F (35.9  C) (Tympanic) 32.03 kg/m2          Blood Pressure from Last 3 Encounters:   09/06/17 136/86   01/09/17 122/78   01/05/17 116/70    Weight from Last 3 Encounters:   09/06/17 236 lb 3.2 oz (107.1 kg)   01/17/17 230 lb (104.3 kg)   01/09/17 230 lb 1.6 oz (104.4 kg)              Today, you had the following     No orders found for display         Today's Medication Changes          These changes are accurate as of: 9/6/17  9:16 AM.  If you have any questions, ask your nurse or doctor.               Start taking these medicines.        Dose/Directions    amoxicillin-clavulanate 875-125 MG per tablet   Commonly known as:  AUGMENTIN   Used for:  Acute sinusitis with symptoms > 10 days   Started by:  Jayleen Hendricks APRN CNP        Dose:  1 tablet   Take 1 tablet by mouth 2 times daily   Quantity:  20 tablet   Refills:  0            Where to get your medicines      [facility] [place] [place] - [address] [facility]  [address] [facility] [address] [place] [address]     Phone:  [phone]     amoxicillin-clavulanate 875-125 MG per tablet                Primary Care Provider Office Phone # Fax #    Mamadou Blair [phone]       [address] [facility] [address]        Equal Access to Services     [facility] AH: Hadii [place] hadasho Soomaali, waaxda luqadaha, qaybta kaalmada adeegyada, jose antonio idiavril velasquez. So [facility] [phone].    ATENCIÓN: Si habla español, tiene a larry disposición servicios gratuitos de asistencia lingüística. Llame al [phone].    We comply with applicable federal civil rights laws and [place] laws. We do not discriminate on the basis of race, color, national origin, age, disability sex, sexual orientation or gender identity.            Thank you!      Thank you for choosing [facility]  for your care. Our goal is always to provide you with excellent care. Hearing back from our patients is one way we can continue to improve our services. Please take a few minutes to complete the written survey that you may receive in the mail after your visit with us. Thank you!             Your Updated Medication List - Protect others around you: Learn how to safely use, store and throw away your medicines at www.disposemymeds.org.          This list is accurate as of: 9/6/17  9:16 AM.  Always use your most recent med list.                   Brand Name Dispense Instructions for use Diagnosis    acetaminophen 325 MG tablet    TYLENOL    100 tablet    Take 2 tablets (650 mg) by mouth every 4 hours as needed for mild pain    Olecranon bursitis of left elbow       amoxicillin-clavulanate 875-125 MG per tablet    AUGMENTIN    20 tablet    Take 1 tablet by mouth 2 times daily    Acute sinusitis with symptoms > 10 days       colestipol 1 G tablet    COLESTID     Take 1 mg by mouth 1-2 daily

## 2017-10-10 ENCOUNTER — RADIANT APPOINTMENT (OUTPATIENT)
Dept: GENERAL RADIOLOGY | Facility: OTHER | Age: 42
End: 2017-10-10
Attending: PHYSICIAN ASSISTANT
Payer: COMMERCIAL

## 2017-10-10 ENCOUNTER — OFFICE VISIT (OUTPATIENT)
Dept: FAMILY MEDICINE | Facility: OTHER | Age: 42
End: 2017-10-10
Payer: COMMERCIAL

## 2017-10-10 VITALS
WEIGHT: 242.9 LBS | TEMPERATURE: 96.2 F | SYSTOLIC BLOOD PRESSURE: 124 MMHG | HEART RATE: 72 BPM | OXYGEN SATURATION: 97 % | BODY MASS INDEX: 32.94 KG/M2 | DIASTOLIC BLOOD PRESSURE: 80 MMHG | RESPIRATION RATE: 16 BRPM

## 2017-10-10 DIAGNOSIS — S69.91XA HAND INJURY, RIGHT, INITIAL ENCOUNTER: Primary | ICD-10-CM

## 2017-10-10 DIAGNOSIS — S69.91XA HAND INJURY, RIGHT, INITIAL ENCOUNTER: ICD-10-CM

## 2017-10-10 DIAGNOSIS — S69.92XA HAND INJURY, LEFT, INITIAL ENCOUNTER: ICD-10-CM

## 2017-10-10 PROCEDURE — 73130 X-RAY EXAM OF HAND: CPT | Mod: RT

## 2017-10-10 PROCEDURE — 99214 OFFICE O/P EST MOD 30 MIN: CPT | Performed by: PHYSICIAN ASSISTANT

## 2017-10-10 RX ORDER — NAPROXEN 500 MG/1
500 TABLET ORAL 2 TIMES DAILY PRN
Qty: 30 TABLET | Refills: 1 | Status: SHIPPED | OUTPATIENT
Start: 2017-10-10 | End: 2018-09-28

## 2017-10-10 ASSESSMENT — PAIN SCALES - GENERAL: PAINLEVEL: MODERATE PAIN (5)

## 2017-10-10 NOTE — PATIENT INSTRUCTIONS
I do not see an obvious fracture on xray. We will contact you with final radiology read. I would have you elevate and ice the hand to reduce swelling and will cover for secondary infection with an oral antibiotic and for inflammation with 10-14 days of Naproxen 2 x daily with food. If swelling and range of motion is not improving in the next 5-7 days follow up for a recheck.

## 2017-10-10 NOTE — LETTER
[facility]  [address] [facility] [address]  Phone: [phone]    October 10, 2017        Dustin Mccoy  [address] [facility] [address]          To whom it may concern:    RE: Dustin Mccoy    Patient may return to work 10/11 with the following:  Limited use of the right hand due to injury causing swelling and decreased  strength. He can return to no restrictions next week.     Please contact me for questions or concerns.      Sincerely,        Maria M Moore PA-C

## 2017-10-10 NOTE — PROGRESS NOTES
SUBJECTIVE:   Dustin Mccoy is a 42 year old male who presents to clinic today for the following health issues:      Joint Pain    Onset: happened on 10/7/2017    Description:   Location: right hand  Character: Sharp and Dull ache    Intensity: moderate    Progression of Symptoms: same    Accompanying Signs & Symptoms:  Other symptoms: radiation of pain to arm, numbness, tingling, weakness of hand, warmth, swelling and redness    History:   Previous similar pain: no       Precipitating factors:   Trauma or overuse: YES    Alleviating factors:  Improved by: immobilization and Ibuprofen    Therapies Tried and outcome: ibuprofen; slight relief    Patient was hiking over the weekend and slipped and fell hitting his right hand on a log, he has had tenderness, redness and swelling to the knuckles as well as decreased strength and ROM since that time.       -------------------------------------    Problem list and histories reviewed & adjusted, as indicated.  Additional history: as documented    BP Readings from Last 3 Encounters:   10/10/17 124/80   09/06/17 136/86   01/09/17 122/78    Wt Readings from Last 3 Encounters:   10/10/17 242 lb 14.4 oz (110.2 kg)   09/06/17 236 lb 3.2 oz (107.1 kg)   01/17/17 230 lb (104.3 kg)        Reviewed and updated as needed this visit by clinical staffTobacco  Allergies  Med Hx  Surg Hx  Fam Hx  Soc Hx      Reviewed and updated as needed this visit by Provider         ROS:  Constitutional, HEENT, cardiovascular, pulmonary, gi and gu systems are negative, except as otherwise noted.      OBJECTIVE:   /80 (BP Location: Right arm, Patient Position: Chair, Cuff Size: Adult Large)  Pulse 72  Temp 96.2  F (35.7  C) (Oral)  Resp 16  Wt 242 lb 14.4 oz (110.2 kg)  SpO2 97%  BMI 32.94 kg/m2  Body mass index is 32.94 kg/(m^2).  GENERAL: healthy, alert and no distress  RESP: lungs clear to auscultation - no rales, rhonchi or wheezes  CV: regular rates and rhythm, peripheral  pulses strong and no peripheral edema  MS: swelling to the dorsal right hand over the proximal knuckles  SKIN: mild erythema to the proximal knuckles of the right hand    Diagnostic Test Results:  Xray:pending    ASSESSMENT/PLAN:       ICD-10-CM    1. Hand injury, right, initial encounter S69.91XA XR Hand Right G/E 3 Views     cephalexin (KEFLEX) 250 MG capsule     naproxen (NAPROSYN) 500 MG tablet       I will cover for secondary infection and restrict work duty due to swelling and decreased . Follow up if new or worsening symptoms or if not improving in the next 7-10 days.   See Patient Instructions    Maria M Moore PA-C  [facility]

## 2017-10-10 NOTE — NURSING NOTE
Chief Complaint   Patient presents with     Musculoskeletal Problem     right hand injury, happened on 10/7/2017       Initial /80 (BP Location: Right arm, Patient Position: Chair, Cuff Size: Adult Large)  Pulse 72  Temp 96.2  F (35.7  C) (Oral)  Resp 16  Wt 242 lb 14.4 oz (110.2 kg)  SpO2 97%  BMI 32.94 kg/m2 Estimated body mass index is 32.94 kg/(m^2) as calculated from the following:    Height as of 1/17/17: 6' (1.829 m).    Weight as of this encounter: 242 lb 14.4 oz (110.2 kg).  Medication Reconciliation: hanna LOVE LPN

## 2017-10-10 NOTE — MR AVS SNAPSHOT
"              After Visit Summary   10/10/2017    Dustin Mccoy    MRN: [id number]           Patient Information     Date Of Birth          1975        Visit Information        Provider Department      10/10/2017 8:20 AM Maria M Moore PA-C [facility]        Today's Diagnoses     Hand injury, right, initial encounter    -  1      Care Instructions    I do not see an obvious fracture on xray. We will contact you with final radiology read. I would have you elevate and ice the hand to reduce swelling and will cover for secondary infection with an oral antibiotic and for inflammation with 10-14 days of Naproxen 2 x daily with food. If swelling and range of motion is not improving in the next 5-7 days follow up for a recheck.           Follow-ups after your visit        Follow-up notes from your care team     Return if symptoms worsen or fail to improve.      Who to contact     If you have questions or need follow up information about today's clinic visit or your schedule please contact [facility] directly at [phone].  Normal or non-critical lab and imaging results will be communicated to you by Networked Organisms[employer], letter or phone within 4 business days after the clinic has received the results. If you do not hear from us within 7 days, please contact the clinic through [employer] or phone. If you have a critical or abnormal lab result, we will notify you by phone as soon as possible.  Submit refill requests through [employer] or call your pharmacy and they will forward the refill request to us. Please allow 3 business days for your refill to be completed.          Additional Information About Your Visit        Networked [employer]ismsharN42 Information     [employer] lets you send messages to your doctor, view your test results, renew your prescriptions, schedule appointments and more. To sign up, go to [email or web address] . Click on \"Log in\" on the left side of the screen, which will take you to the Welcome " "page. Then click on \"Sign up Now\" on the right side of the page.     You will be asked to enter the access code listed below, as well as some personal information. Please follow the directions to create your username and password.     Your access code is: [email or web address]  Expires: 2017  8:14 AM     Your access code will  in 90 days. If you need help or a new code, please call your [place] clinic or [phone].        Care EveryWhere ID     This is your Care EveryWhere ID. This could be used by other organizations to access your [place] medical records  [id number]        Your Vitals Were     Pulse Temperature Respirations Pulse Oximetry BMI (Body Mass Index)       72 96.2  F (35.7  C) (Oral) 16 97% 32.94 kg/m2        Blood Pressure from Last 3 Encounters:   10/10/17 124/80   17 136/86   17 122/78    Weight from Last 3 Encounters:   10/10/17 242 lb 14.4 oz (110.2 kg)   17 236 lb 3.2 oz (107.1 kg)   17 230 lb (104.3 kg)                 Today's Medication Changes          These changes are accurate as of: 10/10/17  8:51 AM.  If you have any questions, ask your nurse or doctor.               Start taking these medicines.        Dose/Directions    cephalexin 250 MG capsule   Commonly known as:  KEFLEX   Used for:  Hand injury, right, initial encounter   Started by:  Maria M Moore PA-C        Dose:  250 mg   Take 1 capsule (250 mg) by mouth 4 times daily   Quantity:  40 capsule   Refills:  0       naproxen 500 MG tablet   Commonly known as:  NAPROSYN   Used for:  Hand injury, right, initial encounter   Started by:  Maria M Moore PA-C        Dose:  500 mg   Take 1 tablet (500 mg) by mouth 2 times daily as needed for moderate pain   Quantity:  30 tablet   Refills:  1            Where to get your medicines      These medications were sent to [facility] [place] - [address]  [address] [facility] box [facility], [place] [address]     Phone:  [phone]     cephalexin 250 MG capsule    " naproxen 500 MG tablet                Primary Care Provider Office Phone # Fax #    Mamadou Blair [phone]       [address] [facility] [address]        Equal Access to Services     [facility] HUSAIN : Hadsrini lay[place] matt oCle, waaxda luqadaha, qaybta kaalmada ademela, jose antonio stringer damonorion griffin da velasquez. So [facility] [phone].    ATENCIÓN: Si habla español, tiene a larry disposición servicios gratuitos de asistencia lingüística. Llame al [phone].    We comply with applicable federal civil rights laws and [place] laws. We do not discriminate on the basis of race, color, national origin, age, disability, sex, sexual orientation, or gender identity.            Thank you!     Thank you for choosing [facility]  for your care. Our goal is always to provide you with excellent care. Hearing back from our patients is one way we can continue to improve our services. Please take a few minutes to complete the written survey that you may receive in the mail after your visit with us. Thank you!             Your Updated Medication List - Protect others around you: Learn how to safely use, store and throw away your medicines at www.disposemymeds.org.          This list is accurate as of: 10/10/17  8:51 AM.  Always use your most recent med list.                   Brand Name Dispense Instructions for use Diagnosis    acetaminophen 325 MG tablet    TYLENOL    100 tablet    Take 2 tablets (650 mg) by mouth every 4 hours as needed for mild pain    Olecranon bursitis of left elbow       cephalexin 250 MG capsule    KEFLEX    40 capsule    Take 1 capsule (250 mg) by mouth 4 times daily    Hand injury, right, initial encounter       colestipol 1 G tablet    COLESTID     Take 1 mg by mouth 1-2 daily        IBUPROFEN PO      As needed        naproxen 500 MG tablet    NAPROSYN    30 tablet    Take 1 tablet (500 mg) by mouth 2 times daily as needed for moderate pain    Hand injury,  right, initial encounter

## 2017-11-16 ENCOUNTER — RADIANT APPOINTMENT (OUTPATIENT)
Dept: GENERAL RADIOLOGY | Facility: OTHER | Age: 42
End: 2017-11-16
Attending: PHYSICIAN ASSISTANT
Payer: COMMERCIAL

## 2017-11-16 ENCOUNTER — OFFICE VISIT (OUTPATIENT)
Dept: FAMILY MEDICINE | Facility: OTHER | Age: 42
End: 2017-11-16
Payer: COMMERCIAL

## 2017-11-16 VITALS
HEART RATE: 76 BPM | WEIGHT: 238.4 LBS | RESPIRATION RATE: 16 BRPM | DIASTOLIC BLOOD PRESSURE: 74 MMHG | TEMPERATURE: 96.7 F | BODY MASS INDEX: 32.33 KG/M2 | SYSTOLIC BLOOD PRESSURE: 118 MMHG

## 2017-11-16 DIAGNOSIS — S69.91XD INJURY OF RIGHT HAND, SUBSEQUENT ENCOUNTER: ICD-10-CM

## 2017-11-16 DIAGNOSIS — S69.91XD INJURY OF RIGHT HAND, SUBSEQUENT ENCOUNTER: Primary | ICD-10-CM

## 2017-11-16 PROCEDURE — 73130 X-RAY EXAM OF HAND: CPT | Mod: RT

## 2017-11-16 PROCEDURE — 99214 OFFICE O/P EST MOD 30 MIN: CPT | Performed by: PHYSICIAN ASSISTANT

## 2017-11-16 RX ORDER — NAPROXEN 500 MG/1
500 TABLET ORAL 2 TIMES DAILY PRN
Qty: 30 TABLET | Refills: 1 | Status: SHIPPED | OUTPATIENT
Start: 2017-11-16 | End: 2018-09-28

## 2017-11-16 ASSESSMENT — PAIN SCALES - GENERAL: PAINLEVEL: MODERATE PAIN (5)

## 2017-11-16 NOTE — PATIENT INSTRUCTIONS
I will contact you with xray results and will refer you to specialist for further evaluation of persisting hand pain and swelling. I will have you use ice and naproxen to help with swelling and inflammation.

## 2017-11-16 NOTE — PROGRESS NOTES
SUBJECTIVE:   Dustin Mccoy is a 42 year old male who presents to clinic today for the following health issues:      Concern - recheck right hand  Onset: recheck    Description:   Sharp,dull ache    Intensity: moderate    Progression of Symptoms:  worsening    Accompanying Signs & Symptoms:  Swelling, red, loss of strenght    Previous history of similar problem:   yes    Precipitating factors:   Worsened by: grabbing, use of the hand    Alleviating factors:  Improved by: rest    Therapies Tried and outcome: rest, good relief    Patient injured his hand by striking it on a christian while hiking about a month ago. He had a negative xray at that time and had resolution of swelling with rest of the hand but has not had complete resolution of pain. He has again developed some swelling and pain to the hand and has had decreased  strength and maneuverability. 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 He is concerned about his ability to work with the recurrent increased swelling and pain causing decreased strength.   -------------------------------------    Problem list and histories reviewed & adjusted, as indicated.  Additional history: as documented    BP Readings from Last 3 Encounters:   11/16/17 118/74   10/10/17 124/80   09/06/17 136/86    Wt Readings from Last 3 Encounters:   11/16/17 238 lb 6.4 oz (108.1 kg)   10/10/17 242 lb 14.4 oz (110.2 kg)   09/06/17 236 lb 3.2 oz (107.1 kg)        Reviewed and updated as needed this visit by clinical staffTobacco  Allergies  Med Hx  Surg Hx  Fam Hx  Soc Hx      Reviewed and updated as needed this visit by Provider       ROS:  Constitutional, HEENT, cardiovascular, pulmonary, gi and gu systems are negative, except as otherwise noted.      OBJECTIVE:   /74 (BP Location: Right arm, Patient Position: Chair, Cuff Size: Adult Large)  Pulse 76  Temp 96.7  F (35.9  C) (Oral)  Resp 16  Wt 238 lb 6.4 oz (108.1 kg)  BMI 32.33 kg/m2  Body mass  index is 32.33 kg/(m^2).  GENERAL: healthy, alert and no distress  RESP: lungs clear to auscultation - no rales, rhonchi or wheezes  CV: regular rates and rhythm  MS: the right hand has mild erythema and swelling to the proximal joints of the middle 3 fingers, there is tenderness to palpation to the area as well as decreased strength to the hand.     Diagnostic Test Results:  Xray- pending     ASSESSMENT/PLAN:       ICD-10-CM    1. Injury of right hand, subsequent encounter S69.91XD XR Hand Right G/E 3 Views     naproxen (NAPROSYN) 500 MG tablet     ORTHO  REFERRAL       I will re xray the hand with persisting pain and recurrent swelling. I will also refer patient to specialist for further evaluation and treatment of persistent hand symptoms.   See Patient Instructions    Maria M Moore PA-C  [facility]

## 2017-11-16 NOTE — LETTER
[facility]  [address] [facility] [address]  Phone: [phone]    November 16, 2017        Dustin Mccoy  [address] [facility] [address]          To whom it may concern:    RE: Dustin Mccoy    Patient was seen and treated today at our clinic.  He has a right hand injury and does not have full  strength to the hand. He should be on restricted duty for through 11/27 to allow healing.    Please contact me for questions or concerns.      Sincerely,        Maria M Moore PA-C

## 2017-11-16 NOTE — NURSING NOTE
Chief Complaint   Patient presents with     Musculoskeletal Problem     recheck right hands       Initial /74 (BP Location: Right arm, Patient Position: Chair, Cuff Size: Adult Large)  Pulse 76  Temp 96.7  F (35.9  C) (Oral)  Resp 16  Wt 238 lb 6.4 oz (108.1 kg)  BMI 32.33 kg/m2 Estimated body mass index is 32.33 kg/(m^2) as calculated from the following:    Height as of 1/17/17: 6' (1.829 m).    Weight as of this encounter: 238 lb 6.4 oz (108.1 kg).  Medication Reconciliation: complete       Julia LOVE LPN

## 2017-11-16 NOTE — MR AVS SNAPSHOT
After Visit Summary   11/16/2017    Dustin Mccoy    MRN: [id number]           Patient Information     Date Of Birth          1975        Visit Information        Provider Department      11/16/2017 8:40 AM Maria M Moore PA-C [facility]        Today's Diagnoses     Injury of right hand, subsequent encounter    -  1      Care Instructions    I will contact you with xray results and will refer you to specialist for further evaluation of persisting hand pain and swelling. I will have you use ice and naproxen to help with swelling and inflammation.             Follow-ups after your visit        Additional Services     ORTHO  REFERRAL       [facility] is referring you to the Orthopedic  Services at [facility].       The  Representative will assist you in the coordination of your Orthopedic and Musculoskeletal Care as prescribed by your physician.    The  Representative will call you within 1 business day to help schedule your appointment, or you may contact the  Representative at:    All areas ~ [phone]     Type of Referral : Surgical / Specialist  Hand        Timeframe requested: Within 2 weeks    Coverage of these services is subject to the terms and limitations of your health insurance plan.  Please call member services at your health plan with any benefit or coverage questions.      If X-rays, CT or MRI's have been performed, please contact the facility where they were done to arrange for , prior to your scheduled appointment.  Please bring this referral request to your appointment and present it to your specialist.                  Follow-up notes from your care team     Return if symptoms worsen or fail to improve.      Who to contact     If you have questions or need follow up information about today's clinic visit or your schedule please contact [facility]  "directly at [phone].  Normal or non-critical lab and imaging results will be communicated to you by [email or web address] Applicationhart, letter or phone within 4 business days after the clinic has received the results. If you do not hear from us within 7 days, please contact the clinic through [email or web address] Application[employer] or phone. If you have a critical or abnormal lab result, we will notify you by phone as soon as possible.  Submit refill requests through [employer] or call your pharmacy and they will forward the refill request to us. Please allow 3 business days for your refill to be completed.          Additional Information About Your Visit        [email or web address] Application[employer] Information     [employer] lets you send messages to your doctor, view your test results, renew your prescriptions, schedule appointments and more. To sign up, go to [email or web address] . Click on \"Log in\" on the left side of the screen, which will take you to the Welcome page. Then click on \"Sign up Now\" on the right side of the page.     You will be asked to enter the access code listed below, as well as some personal information. Please follow the directions to create your username and password.     Your access code is: [email or web address]  Expires: 2017  7:14 AM     Your access code will  in 90 days. If you need help or a new code, please call your [place] clinic or [phone].        Care EveryWhere ID     This is your Care EveryWhere ID. This could be used by other organizations to access your [place] medical records  [id number]        Your Vitals Were     Pulse Temperature Respirations BMI (Body Mass Index)          76 96.7  F (35.9  C) (Oral) 16 32.33 kg/m2         Blood Pressure from Last 3 Encounters:   17 118/74   10/10/17 124/80   17 136/86    Weight from Last 3 Encounters:   17 238 lb 6.4 oz (108.1 kg)   10/10/17 242 lb 14.4 oz (110.2 kg)   17 236 lb 3.2 oz (107.1 kg)              We Performed the Following     ORTHO  REFERRAL          Today's " Medication Changes          These changes are accurate as of: 11/16/17  9:24 AM.  If you have any questions, ask your nurse or doctor.               These medicines have changed or have updated prescriptions.        Dose/Directions    * naproxen 500 MG tablet   Commonly known as:  NAPROSYN   This may have changed:  Another medication with the same name was added. Make sure you understand how and when to take each.   Used for:  Hand injury, right, initial encounter   Changed by:  Maria M Moore PA-C        Dose:  500 mg   Take 1 tablet (500 mg) by mouth 2 times daily as needed for moderate pain   Quantity:  30 tablet   Refills:  1       * naproxen 500 MG tablet   Commonly known as:  NAPROSYN   This may have changed:  You were already taking a medication with the same name, and this prescription was added. Make sure you understand how and when to take each.   Used for:  Injury of right hand, subsequent encounter   Changed by:  Maria M Moore PA-C        Dose:  500 mg   Take 1 tablet (500 mg) by mouth 2 times daily as needed for moderate pain   Quantity:  30 tablet   Refills:  1       * Notice:  This list has 2 medication(s) that are the same as other medications prescribed for you. Read the directions carefully, and ask your doctor or other care provider to review them with you.         Where to get your medicines      These medications were sent to [facility] - [place], [place] - [address] [facility]  [address] [address]     Phone:  [phone]     naproxen 500 MG tablet                Primary Care Provider Office Phone # Fax #    Mamadou Blair [phone]       [address] [facility] [address]        Equal Access to Services     [facility]: Hadii layton gutierrez Socecilia, waaxda luqadaha, qaybta kaalmada kellie, jose antonio velasquez. [facility] [phone].    ATENCIÓN: Si habla español, tiene a larry disposición servicios gratuitos de asistencia  lingüísticaDanica Bonilla al [phone].    We comply with applicable federal civil rights laws and [place] laws. We do not discriminate on the basis of race, color, national origin, age, disability, sex, sexual orientation, or gender identity.            Thank you!     Thank you for choosing [facility]  for your care. Our goal is always to provide you with excellent care. Hearing back from our patients is one way we can continue to improve our services. Please take a few minutes to complete the written survey that you may receive in the mail after your visit with us. Thank you!             Your Updated Medication List - Protect others around you: Learn how to safely use, store and throw away your medicines at www.disposemymeds.org.          This list is accurate as of: 11/16/17  9:24 AM.  Always use your most recent med list.                   Brand Name Dispense Instructions for use Diagnosis    acetaminophen 325 MG tablet    TYLENOL    100 tablet    Take 2 tablets (650 mg) by mouth every 4 hours as needed for mild pain    Olecranon bursitis of left elbow       colestipol 1 G tablet    COLESTID     Take 1 mg by mouth 1-2 daily        IBUPROFEN PO      As needed        * naproxen 500 MG tablet    NAPROSYN    30 tablet    Take 1 tablet (500 mg) by mouth 2 times daily as needed for moderate pain    Hand injury, right, initial encounter       * naproxen 500 MG tablet    NAPROSYN    30 tablet    Take 1 tablet (500 mg) by mouth 2 times daily as needed for moderate pain    Injury of right hand, subsequent encounter       * Notice:  This list has 2 medication(s) that are the same as other medications prescribed for you. Read the directions carefully, and ask your doctor or other care provider to review them with you.

## 2017-11-27 NOTE — PROGRESS NOTES
ORTHOPEDIC CONSULT      Chief Complaint: Dustin Mccoy is a 42 year old right hand dominant male who works as a .      He is being seen for   Chief Complaints and History of Present Illnesses   Patient presents with     Consult     rt hand injury per Maria M Moore PA-C         History of Present Illness:   Dustin Mccoy is a 42 year old male who is seen in consultation at the request of Maria M Moore PA-C  History of Present illness:  Dustin presents for evaluation of:  1.) rt hand  Onset: early and October  Symptoms brought on by fall jammed on a log .   Character:  dull ache and swelling.    Progression of symptoms:  better.    Previous similar pain: no .   Pain Level:  4/10.   Previous treatments:  nothing and Ibuprofen. Buddy tape.  All help.  Currently on Blood thinners? no  Diagnosis of Diabetes? no  Originally was on antibiotics for possible cellulitis and naprosyn which upset his stomach.  He has been on restricted duty, notes intermittent swelling, stiffness, and decreased  strength.      Patient's past medical, surgical, social and family histories reviewed.     Past Medical History:   Diagnosis Date     Atypical chest pain 12/28/2015     H/O irritable bowel syndrome 12/28/2015       Past Surgical History:   Procedure Laterality Date     NO HISTORY OF SURGERY         Medications:    Current Outpatient Prescriptions on File Prior to Visit:  naproxen (NAPROSYN) 500 MG tablet Take 1 tablet (500 mg) by mouth 2 times daily as needed for moderate pain   IBUPROFEN PO As needed   naproxen (NAPROSYN) 500 MG tablet Take 1 tablet (500 mg) by mouth 2 times daily as needed for moderate pain (Patient not taking: Reported on 11/16/2017)   colestipol (COLESTID) 1 G tablet Take 1 mg by mouth 1-2 daily   acetaminophen (TYLENOL) 325 MG tablet Take 2 tablets (650 mg) by mouth every 4 hours as needed for mild pain (Patient not taking: Reported on 11/16/2017)     No current  "facility-administered medications on file prior to visit.     Allergies   Allergen Reactions     Bactrim [Sulfamethoxazole W/Trimethoprim]      Erythromycin      Sulfa Drugs        Social History     Occupational History     Not on file.     Social History Main Topics     Smoking status: Never Smoker     Smokeless tobacco: Never Used     Alcohol use Yes      Comment: occasional     Drug use: No     Sexual activity: Yes     Partners: Female     Birth control/ protection: Condom       Family History   Problem Relation Age of Onset     DIABETES Father        REVIEW OF SYSTEMS  10 point review systems performed otherwise negative as noted as per history of present illness.    Physical Exam:  Vitals: Temp 96  F (35.6  C)  Ht 1.803 m (5' 11\")  Wt 108 kg (238 lb)  BMI 33.19 kg/m2  BMI= Body mass index is 33.19 kg/(m^2).    Constitutional: healthy, alert and no acute distress   Psychiatric: mentation appears normal and affect normal/bright  NEURO: no focal deficits  RESP: Normal with easy respirations and no use of accessory muscles to breathe, no audible wheezing or retractions  CV: regular pulse  SKIN: No erythema, rashes, excoriation, or breakdown. No evidence of infection.   JOINT/EXTREMITIES:right Hand/Finger Exam: Inspection:Index Finger:  slight swelling, Long Finger:  slight swelling  Tender: Index Finger:   PIP joint, Long Finger:   PIP joint  Range of Motion All Normal  Strength: full strength          GAIT: non-antalgic  Lymph: no palpable lymph nodes    Diagnostic Modalities:  right hand X-ray: Normal alignment. No fractures, dislocation or lesions. No soft tissue abnormalities.  Independent visualization of the images was performed.      Impression: right Hand bone contusion    Plan:  All of the above pertinent physical exam and imaging modalities findings was reviewed with Dustin.                                          CONSERVATIVE CARE:  I recommend conservative care for the patient to include NSAIDs, " focused self directed physical therapy, activity modifications, OT. Today I provided or dispensed occupational therapy, info and work note.                                                FUTURE PLAN:  On their return if they still have symptoms we will consider MRI.      Return to clinic 6, week(s), or sooner as needed for changes.  Re-x-ray on return: No    Bertha Naranjo M.D.

## 2017-11-29 ENCOUNTER — OFFICE VISIT (OUTPATIENT)
Dept: ORTHOPEDICS | Facility: CLINIC | Age: 42
End: 2017-11-29
Payer: COMMERCIAL

## 2017-11-29 VITALS — WEIGHT: 238 LBS | TEMPERATURE: 96 F | BODY MASS INDEX: 33.32 KG/M2 | HEIGHT: 71 IN

## 2017-11-29 DIAGNOSIS — S60.221D CONTUSION OF RIGHT HAND, SUBSEQUENT ENCOUNTER: Primary | ICD-10-CM

## 2017-11-29 PROCEDURE — 99213 OFFICE O/P EST LOW 20 MIN: CPT | Performed by: ORTHOPAEDIC SURGERY

## 2017-11-29 ASSESSMENT — PAIN SCALES - GENERAL: PAINLEVEL: MODERATE PAIN (4)

## 2017-11-29 NOTE — LETTER
11/29/2017         RE: Dustin Mccoy  [address] [facility] [address]        Dear Colleague,    Thank you for referring your patient, Dustin Mccoy, to the [facility]. Please see a copy of my visit note below.    ORTHOPEDIC CONSULT      Chief Complaint: Dustin Mccoy is a 42 year old right hand dominant male who works as a .      He is being seen for   Chief Complaints and History of Present Illnesses   Patient presents with     Consult     rt hand injury per Maria M Moore PA-C         History of Present Illness:   Dustin Mccoy is a 42 year old male who is seen in consultation at the request of Maria M Moore PA-C  History of Present illness:  Dustin presents for evaluation of:  1.) rt hand  Onset: early and October  Symptoms brought on by fall jammed on a log .   Character:  dull ache and swelling.    Progression of symptoms:  better.    Previous similar pain: no .   Pain Level:  4/10.   Previous treatments:  nothing and Ibuprofen. Buddy tape.  All help.  Currently on Blood thinners? no  Diagnosis of Diabetes? no  Originally was on antibiotics for possible cellulitis and naprosyn which upset his stomach.  He has been on restricted duty, notes intermittent swelling, stiffness, and decreased  strength.      Patient's past medical, surgical, social and family histories reviewed.     Past Medical History:   Diagnosis Date     Atypical chest pain 12/28/2015     H/O irritable bowel syndrome 12/28/2015       Past Surgical History:   Procedure Laterality Date     NO HISTORY OF SURGERY         Medications:    Current Outpatient Prescriptions on File Prior to Visit:  naproxen (NAPROSYN) 500 MG tablet Take 1 tablet (500 mg) by mouth 2 times daily as needed for moderate pain   IBUPROFEN PO As needed   naproxen (NAPROSYN) 500 MG tablet Take 1 tablet (500 mg) by mouth 2 times daily as needed for moderate pain (Patient not taking: Reported on  "11/16/2017)   colestipol (COLESTID) 1 G tablet Take 1 mg by mouth 1-2 daily   acetaminophen (TYLENOL) 325 MG tablet Take 2 tablets (650 mg) by mouth every 4 hours as needed for mild pain (Patient not taking: Reported on 11/16/2017)     No current facility-administered medications on file prior to visit.     Allergies   Allergen Reactions     Bactrim [Sulfamethoxazole W/Trimethoprim]      Erythromycin      Sulfa Drugs        Social History     Occupational History     Not on file.     Social History Main Topics     Smoking status: Never Smoker     Smokeless tobacco: Never Used     Alcohol use Yes      Comment: occasional     Drug use: No     Sexual activity: Yes     Partners: Female     Birth control/ protection: Condom       Family History   Problem Relation Age of Onset     DIABETES Father        REVIEW OF SYSTEMS  10 point review systems performed otherwise negative as noted as per history of present illness.    Physical Exam:  Vitals: Temp 96  F (35.6  C)  Ht 1.803 m (5' 11\")  Wt 108 kg (238 lb)  BMI 33.19 kg/m2  BMI= Body mass index is 33.19 kg/(m^2).    Constitutional: healthy, alert and no acute distress   Psychiatric: mentation appears normal and affect normal/bright  NEURO: no focal deficits  RESP: Normal with easy respirations and no use of accessory muscles to breathe, no audible wheezing or retractions  CV: regular pulse  SKIN: No erythema, rashes, excoriation, or breakdown. No evidence of infection.   JOINT/EXTREMITIES:right Hand/Finger Exam: Inspection:Index Finger:  slight swelling, Long Finger:  slight swelling  Tender: Index Finger:   PIP joint, Long Finger:   PIP joint  Range of Motion All Normal  Strength: full strength          GAIT: non-antalgic  Lymph: no palpable lymph nodes    Diagnostic Modalities:  right hand X-ray: Normal alignment. No fractures, dislocation or lesions. No soft tissue abnormalities.  Independent visualization of the images was performed.      Impression: right Hand bone " contusion    Plan:  All of the above pertinent physical exam and imaging modalities findings was reviewed with Dustin.                                          CONSERVATIVE CARE:  I recommend conservative care for the patient to include NSAIDs, focused self directed physical therapy, activity modifications, OT. Today I provided or dispensed occupational therapy, info and work note.                                                FUTURE PLAN:  On their return if they still have symptoms we will consider MRI.      Return to clinic 6, week(s), or sooner as needed for changes.  Re-x-ray on return: No    Bertha Naranjo M.D.    Again, thank you for allowing me to participate in the care of your patient.        Sincerely,        Bertha Naranjo MD

## 2017-11-29 NOTE — NURSING NOTE
"Chief Complaint   Patient presents with     Consult     rt hand injury per Maria M Moore PA-C       Initial Temp 96  F (35.6  C)  Ht 1.803 m (5' 11\")  Wt 108 kg (238 lb)  BMI 33.19 kg/m2 Estimated body mass index is 33.19 kg/(m^2) as calculated from the following:    Height as of this encounter: 1.803 m (5' 11\").    Weight as of this encounter: 108 kg (238 lb).  Medication Reconciliation: complete    BP completed using cuff size: NA (Not Taken)    Ev Guillen MA      "

## 2017-11-29 NOTE — LETTER
[address] [address]  Phone: [phone]  Fax: [phone]    November 29, 2017        Dustin Mccoy  [address] [facility] [address]          To whom it may concern:    RE: Dustin Mccoy    Patient was seen and treated today at our clinic.  No lifting pulling or pushing with right hand.  May return to work 12/4/17 with no restrictions.     Please contact me for questions or concerns.      Sincerely,        Bertha Naranjo MD

## 2017-11-29 NOTE — MR AVS SNAPSHOT
After Visit Summary   11/29/2017    Dustin Mccoy    MRN: [id number]           Patient Information     Date Of Birth          1975        Visit Information        Provider Department      11/29/2017 1:20 PM Bertha Naranjo MD [facility] Instructions      Understanding Bone Bruise (Bone Contusion)  A bone bruise is an injury to a bone that is less severe than a bone fracture. Bone bruises are fairly common. They can happen to people of all ages. Any type of bone in your body can be bruised. Other injuries often happen along with a bone bruise, such as damage to nearby ligaments.  What happens when a bone is bruised?  Bone is made of different kinds of tissue. The periosteum is a thin layer of tissue that covers most of a bone. Where bones come together, there is usually a layer of cartilage at the edges. The bone here is called subchondral bone. Deep inside the bone is an area called the medulla. It contains the bone marrow and fibrous tissue called trabeculae.  With a bone fracture, all of the trabeculae in a region of bone have broken. But with a bone bruise, an injury only damages some of these trabeculae. An injury might cause blood to build up in the area beneath the periosteum. This causes a subperiosteal hematoma, a type of bone bruise. An injury might also cause bleeding and swelling in the area between your cartilage and the bone beneath it. This causes a subchondral bone bruise. Or bleeding and swelling can occur in the medulla of your bone. This is called an intraosseous bone bruise.  What causes a bone bruise?  Injury of any kind can cause a bone bruise. Sports injuries, motor vehicle accidents, or falls from a height can cause them. Twisting injuries that cause joint sprains can also cause a bone bruise. Health conditions like arthritis may also lead to a bone bruise. This is because arthritis causes bone surfaces to grind against each other. Child  abuse is another cause of bone bruises.  Symptoms of a bone bruise  Symptoms of a bone bruise can include:    Pain and soreness in the injured area    Swelling in the area and soft tissues around it    Change in color of the injured area    Swelling or stiffness of an injured joint  This pain is often more severe and lasts longer than a soft tissue injury. How severe your symptoms are and how long they last depends on how severe the bone bruise is.  Diagnosing a bone bruise  Your healthcare provider will ask you about your medical history and symptoms. He or she will ask how you got your injury. Your provider will examine the injured area to check for pain, bruising, and swelling. After the exam, your health care provider may be able to tell if you have a bone bruise.  A bone bruise doesn t show up on an X-ray. But you may be given an X-ray to rule out a bone fracture. A fracture may need a different kind of treatment. An MRI can confirm a bone bruise. But your healthcare provider will likely only give you an MRI if your symptoms don t get better.  Date Last Reviewed: 4/1/2017 2000-2017 [employer]. [address] [place], [place] [id number]. All rights reserved. This information is not intended as a substitute for professional medical care. Always follow your healthcare professional's instructions.        Treatment for Bone Bruise (Bone Contusion)  A bone bruise is an injury to a bone that is less severe than a bone fracture. Bone bruises are fairly common. They can happen to people of all ages. Any type of bone in your body can get a bone bruise. Other injuries often happen along with a bone bruise, such as damage to nearby ligaments.  Types of treatment  Treatment for a bone bruise may include:    Resting the bone or joint    Putting an ice pack on the area several times a day    Raising the injury above the level of your heart to reduce swelling    Taking medicine to reduce pain and  swelling    Wearing a brace or other device to limit movement, if needed  Your doctor may give you advice about your diet. This is because eating a diet that is rich in calcium, vitamin D, and protein can help you heal. Your doctor may ask you to not use certain over-the-counter medicines for pain. Some of these may delay normal bone healing. If you smoke, your doctor will advise you to stop smoking. Smoking can also delay bone healing.  Your health care provider will tell you how long you should avoid putting weight on your bone. Most bone bruises slowly heal over 2 to 4 months. A larger bone bruise may take longer to heal. You may not be able to return to sports activities for weeks or months. If your symptoms don t go away, your health care provider may give you an MRI.  Possible complications of a bone bruise  Most bone bruises heal without any problems. If your bone bruise is very large, your body may have trouble getting blood flow back to the area. This can cause avascular necrosis of the bone. This leads to death of that part of the bone.     When to call the health care provider  Call your health care provider if your symptoms don t start to get better in a few days. Call him or her right away if you have any severe symptoms, such as a high fever.      Date Last Reviewed: 7/21/2015 2000-2017 [employer]. [address] [place] [address]. All rights reserved. This information is not intended as a substitute for professional medical care. Always follow your healthcare professional's instructions.        Hand and Wrist Exercises: Finger  and Release      This exercise is designed to stretch and strengthen your hands and wrists. Before beginning, read through all the instructions. While exercising, breathe normally. If you feel any pain, stop the exercise. If pain persists, inform your healthcare provider.    With your _____ hand, make a tight fist. (Or you can grasp a sponge or  "ball.) Hold for _____ seconds. Then relax.    Spread your fingers as far apart as possible. Hold for _____ seconds. Then relax.    Repeat _____ times. Do _____ sets a day.  Date Last Reviewed: 2015-2017 [employer]. [address] [place] [address]. All rights reserved. This information is not intended as a substitute for professional medical care. Always follow your healthcare professional's instructions.                Follow-ups after your visit        Who to contact     If you have questions or need follow up information about today's clinic visit or your schedule please contact [facility] directly at [phone].  Normal or non-critical lab and imaging results will be communicated to you by Platizahart, letter or phone within 4 business days after the clinic has received the results. If you do not hear from us within 7 days, please contact the clinic through Platizahart or phone. If you have a critical or abnormal lab result, we will notify you by phone as soon as possible.  Submit refill requests through Pressy or call your pharmacy and they will forward the refill request to us. Please allow 3 business days for your refill to be completed.          Additional Information About Your Visit        Pressy Information     Pressy lets you send messages to your doctor, view your test results, renew your prescriptions, schedule appointments and more. To sign up, go to [email or web address] . Click on \"Log in\" on the left side of the screen, which will take you to the Welcome page. Then click on \"Sign up Now\" on the right side of the page.     You will be asked to enter the access code listed below, as well as some personal information. Please follow the directions to create your username and password.     Your access code is: [email or web address]  Expires: 2017  7:14 AM     Your access code will  in 90 days. If you need help or a new code, please call your [facility] " "clinic or [phone].        Care EveryWhere ID     This is your Care EveryWhere ID. This could be used by other organizations to access your [place] medical records  [id number]        Your Vitals Were     Temperature Height BMI (Body Mass Index)             96  F (35.6  C) 1.803 m (5' 11\") 33.19 kg/m2          Blood Pressure from Last 3 Encounters:   11/16/17 118/74   10/10/17 124/80   09/06/17 136/86    Weight from Last 3 Encounters:   11/29/17 108 kg (238 lb)   11/16/17 108.1 kg (238 lb 6.4 oz)   10/10/17 110.2 kg (242 lb 14.4 oz)              Today, you had the following     No orders found for display       Primary Care Provider Office Phone # Fax #    Mamadou Blair [phone]       [address] [facility] [address]        Equal Access to Services     [facility]: Hadii aad ku hadasho Soomaali, waaxda luqadaha, qaybta kaalmada adeegyada, jose antonio roberson . [facility] [phone].    ATENCIÓN: Si kayla espcruz, tiene a larry disposición servicios gratuitos de asistencia lingüística. Llame al [phone].    We comply with applicable federal civil rights laws and [place] laws. We do not discriminate on the basis of race, color, national origin, age, disability, sex, sexual orientation, or gender identity.            Thank you!     Thank you for choosing [facility]  for your care. Our goal is always to provide you with excellent care. Hearing back from our patients is one way we can continue to improve our services. Please take a few minutes to complete the written survey that you may receive in the mail after your visit with us. Thank you!             Your Updated Medication List - Protect others around you: Learn how to safely use, store and throw away your medicines at www.disposemymeds.org.          This list is accurate as of: 11/29/17  2:16 PM.  Always use your most recent med list.                   Brand Name Dispense Instructions " for use Diagnosis    acetaminophen 325 MG tablet    TYLENOL    100 tablet    Take 2 tablets (650 mg) by mouth every 4 hours as needed for mild pain    Olecranon bursitis of left elbow       colestipol 1 G tablet    COLESTID     Take 1 mg by mouth 1-2 daily        IBUPROFEN PO      As needed        * naproxen 500 MG tablet    NAPROSYN    30 tablet    Take 1 tablet (500 mg) by mouth 2 times daily as needed for moderate pain    Hand injury, right, initial encounter       * naproxen 500 MG tablet    NAPROSYN    30 tablet    Take 1 tablet (500 mg) by mouth 2 times daily as needed for moderate pain    Injury of right hand, subsequent encounter       * Notice:  This list has 2 medication(s) that are the same as other medications prescribed for you. Read the directions carefully, and ask your doctor or other care provider to review them with you.

## 2017-11-29 NOTE — PATIENT INSTRUCTIONS
Understanding Bone Bruise (Bone Contusion)  A bone bruise is an injury to a bone that is less severe than a bone fracture. Bone bruises are fairly common. They can happen to people of all ages. Any type of bone in your body can be bruised. Other injuries often happen along with a bone bruise, such as damage to nearby ligaments.  What happens when a bone is bruised?  Bone is made of different kinds of tissue. The periosteum is a thin layer of tissue that covers most of a bone. Where bones come together, there is usually a layer of cartilage at the edges. The bone here is called subchondral bone. Deep inside the bone is an area called the medulla. It contains the bone marrow and fibrous tissue called trabeculae.  With a bone fracture, all of the trabeculae in a region of bone have broken. But with a bone bruise, an injury only damages some of these trabeculae. An injury might cause blood to build up in the area beneath the periosteum. This causes a subperiosteal hematoma, a type of bone bruise. An injury might also cause bleeding and swelling in the area between your cartilage and the bone beneath it. This causes a subchondral bone bruise. Or bleeding and swelling can occur in the medulla of your bone. This is called an intraosseous bone bruise.  What causes a bone bruise?  Injury of any kind can cause a bone bruise. Sports injuries, motor vehicle accidents, or falls from a height can cause them. Twisting injuries that cause joint sprains can also cause a bone bruise. Health conditions like arthritis may also lead to a bone bruise. This is because arthritis causes bone surfaces to grind against each other. Child abuse is another cause of bone bruises.  Symptoms of a bone bruise  Symptoms of a bone bruise can include:    Pain and soreness in the injured area    Swelling in the area and soft tissues around it    Change in color of the injured area    Swelling or stiffness of an injured joint  This pain is often more  severe and lasts longer than a soft tissue injury. How severe your symptoms are and how long they last depends on how severe the bone bruise is.  Diagnosing a bone bruise  Your healthcare provider will ask you about your medical history and symptoms. He or she will ask how you got your injury. Your provider will examine the injured area to check for pain, bruising, and swelling. After the exam, your health care provider may be able to tell if you have a bone bruise.  A bone bruise doesn t show up on an X-ray. But you may be given an X-ray to rule out a bone fracture. A fracture may need a different kind of treatment. An MRI can confirm a bone bruise. But your healthcare provider will likely only give you an MRI if your symptoms don t get better.  Date Last Reviewed: 4/1/2017 2000-2017 [employer]. [address] [place] [address]. All rights reserved. This information is not intended as a substitute for professional medical care. Always follow your healthcare professional's instructions.        Treatment for Bone Bruise (Bone Contusion)  A bone bruise is an injury to a bone that is less severe than a bone fracture. Bone bruises are fairly common. They can happen to people of all ages. Any type of bone in your body can get a bone bruise. Other injuries often happen along with a bone bruise, such as damage to nearby ligaments.  Types of treatment  Treatment for a bone bruise may include:    Resting the bone or joint    Putting an ice pack on the area several times a day    Raising the injury above the level of your heart to reduce swelling    Taking medicine to reduce pain and swelling    Wearing a brace or other device to limit movement, if needed  Your doctor may give you advice about your diet. This is because eating a diet that is rich in calcium, vitamin D, and protein can help you heal. Your doctor may ask you to not use certain over-the-counter medicines for pain. Some of these may  delay normal bone healing. If you smoke, your doctor will advise you to stop smoking. Smoking can also delay bone healing.  Your health care provider will tell you how long you should avoid putting weight on your bone. Most bone bruises slowly heal over 2 to 4 months. A larger bone bruise may take longer to heal. You may not be able to return to sports activities for weeks or months. If your symptoms don t go away, your health care provider may give you an MRI.  Possible complications of a bone bruise  Most bone bruises heal without any problems. If your bone bruise is very large, your body may have trouble getting blood flow back to the area. This can cause avascular necrosis of the bone. This leads to death of that part of the bone.     When to call the health care provider  Call your health care provider if your symptoms don t start to get better in a few days. Call him or her right away if you have any severe symptoms, such as a high fever.      Date Last Reviewed: 7/21/2015 2000-2017 [employer]. [address] [place], [place] [address]. All rights reserved. This information is not intended as a substitute for professional medical care. Always follow your healthcare professional's instructions.        Hand and Wrist Exercises: Finger  and Release      This exercise is designed to stretch and strengthen your hands and wrists. Before beginning, read through all the instructions. While exercising, breathe normally. If you feel any pain, stop the exercise. If pain persists, inform your healthcare provider.    With your _____ hand, make a tight fist. (Or you can grasp a sponge or ball.) Hold for _____ seconds. Then relax.    Spread your fingers as far apart as possible. Hold for _____ seconds. Then relax.    Repeat _____ times. Do _____ sets a day.  Date Last Reviewed: 9/9/2015 2000-2017 [employer]. [address] [place] [address]. All rights reserved. This  information is not intended as a substitute for professional medical care. Always follow your healthcare professional's instructions.

## 2017-11-30 ENCOUNTER — TELEPHONE (OUTPATIENT)
Dept: ORTHOPEDICS | Facility: CLINIC | Age: 42
End: 2017-11-30

## 2017-11-30 NOTE — TELEPHONE ENCOUNTER
Reason for Call:  Other note for return to work    Detailed comments: Patient called stating the note was fine, except at the end his employer needs it to state.. On restrictions 12/1/17 to 12/4/17 and then return to normal duty.  Patient needs this for his employer and will pick it up Friday as soon as it can be complete.  Please call him Friday am.  Thanks    Phone Number Patient can be reached at: Cell number on file:    Telephone Information:   Mobile [phone]       Best Time: any    Can we leave a detailed message on this number? YES    Call taken on 11/30/2017 at 3:47 PM by Kristy Gomez

## 2017-11-30 NOTE — LETTER
[address] [address]  Phone: [phone]  Fax: [phone]    12/01/17          Dustin Mccoy  [address] [facility] [address]          To whom it may concern:    RE: Dustin Mccoy    Patient was seen and treated today at our clinic.  No lifting pulling or pushing with right hand on 12/1/17 through 12/3/17 .  May return to work 12/4/17 with no restrictions.     Please contact me for questions or concerns.      Sincerely,        Bertha Naranjo MD

## 2017-12-01 NOTE — TELEPHONE ENCOUNTER
Letter written with changes left message on cell phone for him to  at [facility] [place]

## 2018-04-26 ENCOUNTER — OFFICE VISIT (OUTPATIENT)
Dept: FAMILY MEDICINE | Facility: OTHER | Age: 43
End: 2018-04-26
Payer: COMMERCIAL

## 2018-04-26 VITALS
OXYGEN SATURATION: 98 % | SYSTOLIC BLOOD PRESSURE: 128 MMHG | BODY MASS INDEX: 31.63 KG/M2 | WEIGHT: 225.9 LBS | TEMPERATURE: 97.5 F | HEIGHT: 71 IN | DIASTOLIC BLOOD PRESSURE: 80 MMHG | RESPIRATION RATE: 18 BRPM | HEART RATE: 86 BPM

## 2018-04-26 DIAGNOSIS — F32.9 REACTIVE DEPRESSION: ICD-10-CM

## 2018-04-26 DIAGNOSIS — G47.00 INSOMNIA, UNSPECIFIED TYPE: Primary | ICD-10-CM

## 2018-04-26 PROCEDURE — 99214 OFFICE O/P EST MOD 30 MIN: CPT | Performed by: PHYSICIAN ASSISTANT

## 2018-04-26 RX ORDER — ZOLPIDEM TARTRATE 12.5 MG/1
12.5 TABLET, FILM COATED, EXTENDED RELEASE ORAL
Qty: 30 TABLET | Refills: 1 | Status: SHIPPED | OUTPATIENT
Start: 2018-04-26 | End: 2018-09-28

## 2018-04-26 ASSESSMENT — PAIN SCALES - GENERAL: PAINLEVEL: NO PAIN (0)

## 2018-04-26 NOTE — LETTER
April 26, 2018      Dustin Mccoy  [address] [facility] [address]        To Whom It May Concern:    Dustin Mccoy  was seen on 04/26/2018.  Please excuse him until 04/30/2018 due to due difficulties with sleep affecting his general health.         Sincerely,        Marcelino Patrick PA-C

## 2018-04-26 NOTE — MR AVS SNAPSHOT
After Visit Summary   4/26/2018    Dustin Mccoy    MRN: [id number]           Patient Information     Date Of Birth          1975        Visit Information        Provider Department      4/26/2018 11:00 AM Marcelino Patrick PA-C [facility]        Today's Diagnoses     Insomnia, unspecified type    -  1    Reactive depression           Follow-ups after your visit        Additional Services     MENTAL HEALTH REFERRAL  - Adult; Outpatient Treatment; Individual/Couples/Family/Group Therapy/Health Psychology; Other: Not Listed - Enter Referral Details in Scheduling Comments Below       All scheduling is subject to the client's specific insurance plan & benefits, provider/location availability, and provider clinical specialities.  Please arrive 15 minutes early for your first appointment and bring your completed paperwork.    Please be aware that coverage of these services is subject to the terms and limitations of your health insurance plan.  Call member services at your health plan with any benefit or coverage questions.    Make appointment with [facility]  Janeen Harry                  Follow-up notes from your care team     Return in about 4 weeks (around 5/24/2018).      Your next 10 appointments already scheduled     May 01, 2018  9:00 AM CDT   New Visit with MICHELLE Denis   [facility] ([facility])    [address] [place] [id number] [phone]              Who to contact     If you have questions or need follow up information about today's clinic visit or your schedule please contact [facility] directly at [phone].  Normal or non-critical lab and imaging results will be communicated to you by MyChart, letter or phone within 4 business days after the clinic has received the results. If you do not hear from us within 7 days, please contact the clinic through MyChart or phone. If you have a  "critical or abnormal lab result, we will notify you by phone as soon as possible.  Submit refill requests through [employer] or call your pharmacy and they will forward the refill request to us. Please allow 3 business days for your refill to be completed.          Additional Information About Your Visit        [employer] Information     [employer] lets you send messages to your doctor, view your test results, renew your prescriptions, schedule appointments and more. To sign up, go to [email or web address] . Click on \"Log in\" on the left side of the screen, which will take you to the Welcome page. Then click on \"Sign up Now\" on the right side of the page.     You will be asked to enter the access code listed below, as well as some personal information. Please follow the directions to create your username and password.     Your access code is: [email or web address]  Expires: 2018 10:16 AM     Your access code will  in 90 days. If you need help or a new code, please call your [place] clinic or [phone].        Care EveryWhere ID     This is your Care EveryWhere ID. This could be used by other organizations to access your [place] medical records  [id number]        Your Vitals Were     Pulse Temperature Respirations Height Pulse Oximetry BMI (Body Mass Index)    86 97.5  F (36.4  C) (Temporal) 18 5' 10.87\" (1.8 m) 98% 31.63 kg/m2       Blood Pressure from Last 3 Encounters:   18 128/80   17 118/74   10/10/17 124/80    Weight from Last 3 Encounters:   18 225 lb 14.4 oz (102.5 kg)   17 238 lb (108 kg)   17 238 lb 6.4 oz (108.1 kg)              We Performed the Following     MENTAL HEALTH REFERRAL  - Adult; Outpatient Treatment; Individual/Couples/Family/Group Therapy/Health Psychology; Other: Not Listed - Enter Referral Details in Scheduling Comments Below          Today's Medication Changes          These changes are accurate as of 18 11:59 AM.  If you have any questions, ask your " nurse or doctor.               Start taking these medicines.        Dose/Directions    zolpidem 12.5 MG CR tablet   Commonly known as:  AMBIEN CR   Used for:  Insomnia, unspecified type   Started by:  Marcelino Patrick PA-C        Dose:  12.5 mg   Take 1 tablet (12.5 mg) by mouth nightly as needed for sleep   Quantity:  30 tablet   Refills:  1            Where to get your medicines      Some of these will need a paper prescription and others can be bought over the counter.  Ask your nurse if you have questions.     Bring a paper prescription for each of these medications     zolpidem 12.5 MG CR tablet                Primary Care Provider Fax #    Physician No Ref-Primary [phone]       No address on file        Equal Access to Services     LOKESH HUSAIN : Eugene Cole, drake parker, bart hopkins, jose antonio velasquez. So [facility] [phone].    ATENCIÓN: Si habla español, tiene a larry disposición servicios gratuitos de asistencia lingüística. Llame al [phone].    We comply with applicable federal civil rights laws and [place] laws. We do not discriminate on the basis of race, color, national origin, age, disability, sex, sexual orientation, or gender identity.            Thank you!     Thank you for choosing [facility]  for your care. Our goal is always to provide you with excellent care. Hearing back from our patients is one way we can continue to improve our services. Please take a few minutes to complete the written survey that you may receive in the mail after your visit with us. Thank you!             Your Updated Medication List - Protect others around you: Learn how to safely use, store and throw away your medicines at www.disposemymeds.org.          This list is accurate as of 4/26/18 11:59 AM.  Always use your most recent med list.                   Brand Name Dispense Instructions for use Diagnosis    acetaminophen 325 MG tablet     TYLENOL    100 tablet    Take 2 tablets (650 mg) by mouth every 4 hours as needed for mild pain    Olecranon bursitis of left elbow       colestipol 1 g tablet    COLESTID     Take 1 mg by mouth 1-2 daily        IBUPROFEN PO      As needed        * naproxen 500 MG tablet    NAPROSYN    30 tablet    Take 1 tablet (500 mg) by mouth 2 times daily as needed for moderate pain    Hand injury, right, initial encounter       * naproxen 500 MG tablet    NAPROSYN    30 tablet    Take 1 tablet (500 mg) by mouth 2 times daily as needed for moderate pain    Injury of right hand, subsequent encounter       zolpidem 12.5 MG CR tablet    AMBIEN CR    30 tablet    Take 1 tablet (12.5 mg) by mouth nightly as needed for sleep    Insomnia, unspecified type       * Notice:  This list has 2 medication(s) that are the same as other medications prescribed for you. Read the directions carefully, and ask your doctor or other care provider to review them with you.

## 2018-04-26 NOTE — PROGRESS NOTES
"Chief Complaint   Patient presents with     Depression     Establish Care     would like to est care       Initial /80 (BP Location: Right arm, Patient Position: Chair, Cuff Size: Adult Regular)  Pulse 86  Temp 97.5  F (36.4  C) (Temporal)  Resp 18  Ht 5' 10.87\" (1.8 m)  Wt 225 lb 14.4 oz (102.5 kg)  SpO2 98%  BMI 31.63 kg/m2 Estimated body mass index is 31.63 kg/(m^2) as calculated from the following:    Height as of this encounter: 5' 10.87\" (1.8 m).    Weight as of this encounter: 225 lb 14.4 oz (102.5 kg).  Medication Reconciliation: complete      SUBJECTIVE:   Dustin Mccoy is a 42 year old male who presents to clinic today for the following health issues: poor sleep due to depression    He is a pleasant 42 year old male who is in clinic today due to difficulty sleeping from a depressed mood being caused by a divorce that just started, states is starting to take affect on his job. 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 He has an 11 year old daughter which is a positive influence for him as well as his father and .     His symptoms currently include poor sleep, diminished appetite, depressed mood, poor motivation, anhedonia, racing and ruminating thoughts. He specifically denies thoughts of hurting himself or others. He does not have many social outlets beyond the aforementioned. Not interested in SSRI or SNRI at this point in time. We discussed the benefit of counseling and he is willing to try it. Most interested in something to help him sleep.       Abnormal Mood Symptoms      Duration: 3-4 weeks    Description:  Depression: YES  Anxiety: no   Panic attacks: no      Accompanying signs and symptoms: see PHQ-9 and MAIK scores    History (similar episodes/previous evaluation): None    Precipitating or alleviating factors: None    Therapies tried and outcome: no    PHQ-9 SCORE 4/26/2018   Total Score 20 "         Problem list and histories reviewed & adjusted, as indicated.  Additional history: as documented    Patient Active Problem List   Diagnosis     H/O irritable bowel syndrome     Atypical chest pain     Olecranon bursitis of left elbow     Cellulitis of left upper extremity     Cellulitis of left elbow     Contusion of right hand, subsequent encounter     Past Surgical History:   Procedure Laterality Date     NO HISTORY OF SURGERY         Social History   Substance Use Topics     Smoking status: Never Smoker     Smokeless tobacco: Never Used     Alcohol use Yes      Comment: occasional     Family History   Problem Relation Age of Onset     DIABETES Father          Current Outpatient Prescriptions   Medication Sig Dispense Refill     zolpidem (AMBIEN CR) 12.5 MG CR tablet Take 1 tablet (12.5 mg) by mouth nightly as needed for sleep 30 tablet 1     acetaminophen (TYLENOL) 325 MG tablet Take 2 tablets (650 mg) by mouth every 4 hours as needed for mild pain (Patient not taking: Reported on 11/16/2017) 100 tablet      colestipol (COLESTID) 1 G tablet Take 1 mg by mouth 1-2 daily       IBUPROFEN PO As needed       naproxen (NAPROSYN) 500 MG tablet Take 1 tablet (500 mg) by mouth 2 times daily as needed for moderate pain (Patient not taking: Reported on 11/16/2017) 30 tablet 1     naproxen (NAPROSYN) 500 MG tablet Take 1 tablet (500 mg) by mouth 2 times daily as needed for moderate pain (Patient not taking: Reported on 4/26/2018) 30 tablet 1     Allergies   Allergen Reactions     Bactrim [Sulfamethoxazole W/Trimethoprim]      Erythromycin      Sulfa Drugs      Labs reviewed in EPIC    Reviewed and updated as needed this visit by clinical staff  Tobacco  Allergies  Meds  Med Hx  Surg Hx  Fam Hx  Soc Hx      Reviewed and updated as needed this visit by Provider         ROS:  CONSTITUTIONAL: NEGATIVE for fever, chills, change in weight  PSYCHIATRIC: NEGATIVE for changes in mood or affect, POSITIVE for anhedonia,  "appetite disturbance - difficultly eating due to foods/drinks tasting poorly, concentration difficulty, depressed mood, feelings of worthlessness/guilt, hopelessness, marital problems, obsessive thoughts, stress from recent initiated divorce and weight loss    OBJECTIVE:     /80 (BP Location: Right arm, Patient Position: Chair, Cuff Size: Adult Regular)  Pulse 86  Temp 97.5  F (36.4  C) (Temporal)  Resp 18  Ht 5' 10.87\" (1.8 m)  Wt 225 lb 14.4 oz (102.5 kg)  SpO2 98%  BMI 31.63 kg/m2  Body mass index is 31.63 kg/(m^2).  GENERAL: healthy, alert and no distress  PSYCH: mentation appears normal, tearful, fatigued, appearance disheveled, difficulty concentration during speaking. Specifically denies thoughts of hurting himself or others.     Diagnostic Test Results:  none     ASSESSMENT/PLAN:     1. Insomnia, unspecified type  He is working night shifts which already has been detrimental to his sleep patterns. Now with his depressed mood and racing thoughts he cannot fall asleep and when he does he is unable to maintain sleep. We discussed several options for sleep medication. He will trial zolpidem and notify clinic if not tolerated. Cautioned use of motor vehicle.  - zolpidem (AMBIEN CR) 12.5 MG CR tablet; Take 1 tablet (12.5 mg) by mouth nightly as needed for sleep  Dispense: 30 tablet; Refill: 1    2. Reactive depression  Not currently interested in medication to help mood, but is willing to try counseling. Currently going through a divorce and having difficulty sleeping/eating. We will continue to monitor in the future.   - MENTAL HEALTH REFERRAL  - Adult; Outpatient Treatment; Individual/Couples/Family/Group Therapy/Health Psychology; Other: Not Listed - Enter Referral Details in Scheduling Comments Below    Follow up with clinic in 1 month to re-assess sleep pattern.     Marcelino Patrick PA-C  [facility]    Addended 04/26/2018: Added PHQ9 score JR  "

## 2018-04-27 ASSESSMENT — PATIENT HEALTH QUESTIONNAIRE - PHQ9: SUM OF ALL RESPONSES TO PHQ QUESTIONS 1-9: 20

## 2018-05-04 ENCOUNTER — OFFICE VISIT (OUTPATIENT)
Dept: BEHAVIORAL HEALTH | Facility: CLINIC | Age: 43
End: 2018-05-04
Payer: COMMERCIAL

## 2018-05-04 DIAGNOSIS — F43.22 ADJUSTMENT DISORDER WITH ANXIOUS MOOD: ICD-10-CM

## 2018-05-04 DIAGNOSIS — F32.2 SEVERE SINGLE CURRENT EPISODE OF MAJOR DEPRESSIVE DISORDER, WITHOUT PSYCHOTIC FEATURES (H): Primary | ICD-10-CM

## 2018-05-04 PROCEDURE — 90791 PSYCH DIAGNOSTIC EVALUATION: CPT | Performed by: MARRIAGE & FAMILY THERAPIST

## 2018-05-04 ASSESSMENT — ANXIETY QUESTIONNAIRES
6. BECOMING EASILY ANNOYED OR IRRITABLE: MORE THAN HALF THE DAYS
GAD7 TOTAL SCORE: 11
1. FEELING NERVOUS, ANXIOUS, OR ON EDGE: SEVERAL DAYS
3. WORRYING TOO MUCH ABOUT DIFFERENT THINGS: SEVERAL DAYS
IF YOU CHECKED OFF ANY PROBLEMS ON THIS QUESTIONNAIRE, HOW DIFFICULT HAVE THESE PROBLEMS MADE IT FOR YOU TO DO YOUR WORK, TAKE CARE OF THINGS AT HOME, OR GET ALONG WITH OTHER PEOPLE: VERY DIFFICULT
2. NOT BEING ABLE TO STOP OR CONTROL WORRYING: MORE THAN HALF THE DAYS
7. FEELING AFRAID AS IF SOMETHING AWFUL MIGHT HAPPEN: NOT AT ALL
5. BEING SO RESTLESS THAT IT IS HARD TO SIT STILL: MORE THAN HALF THE DAYS

## 2018-05-04 ASSESSMENT — PATIENT HEALTH QUESTIONNAIRE - PHQ9: 5. POOR APPETITE OR OVEREATING: NEARLY EVERY DAY

## 2018-05-04 NOTE — MR AVS SNAPSHOT
"              After Visit Summary   5/4/2018    Dustin Mccoy    MRN: [id number]           Patient Information     Date Of Birth          1975        Visit Information        Provider Department      5/4/2018 8:00 AM Janeen Harry LMFT [facility]        Today's Diagnoses     Severe single current episode of major depressive disorder, without psychotic features (H)    -  1    Adjustment disorder with anxious mood           Follow-ups after your visit        Your next 10 appointments already scheduled     May 08, 2018  8:30 AM CDT   Return Visit with MICHELLE Denis   [facility] ([facility]    [address] [facility] [address] [phone]              Who to contact     If you have questions or need follow up information about today's clinic visit or your schedule please contact [facility] directly at [phone].  Normal or non-critical lab and imaging results will be communicated to you by MyChart, letter or phone within 4 business days after the clinic has received the results. If you do not hear from us within 7 days, please contact the clinic through [employer] or phone. If you have a critical or abnormal lab result, we will notify you by phone as soon as possible.  Submit refill requests through [employer] or call your pharmacy and they will forward the refill request to us. Please allow 3 business days for your refill to be completed.          Additional Information About Your Visit        MyChart Information     [employer] lets you send messages to your doctor, view your test results, renew your prescriptions, schedule appointments and more. To sign up, go to [email or web address] . Click on \"Log in\" on the left side of the screen, which will take you to the Welcome page. Then click on \"Sign up Now\" on the right side of the page.     You will be asked to enter the access code listed below, as well as some personal " information. Please follow the directions to create your username and password.     Your access code is: [email or web address]  Expires: 2018 10:16 AM     Your access code will  in 90 days. If you need help or a new code, please call your [place] clinic or [phone].        Care EveryWhere ID     This is your Care EveryWhere ID. This could be used by other organizations to access your [place] medical records  [id number]         Blood Pressure from Last 3 Encounters:   18 128/80   17 118/74   10/10/17 124/80    Weight from Last 3 Encounters:   18 102.5 kg (225 lb 14.4 oz)   17 108 kg (238 lb)   17 108.1 kg (238 lb 6.4 oz)              Today, you had the following     No orders found for display       Primary Care Provider Fax #    Physician No Ref-Primary [phone]       No address on file        Equal Access to Services     LOKESH HUSAIN : Hadii aad ku hadasho Soomaali, waaxda luqadaha, qaybta kaalmada adeegyada, waxay javanin siomara roberson . So [facility] [phone].    ATENCIÓN: Si habla español, tiene a larry disposición servicios gratuitos de asistencia lingüística. Llame al [phone].    We comply with applicable federal civil rights laws and [place] laws. We do not discriminate on the basis of race, color, national origin, age, disability, sex, sexual orientation, or gender identity.            Thank you!     Thank you for choosing [facility]  for your care. Our goal is always to provide you with excellent care. Hearing back from our patients is one way we can continue to improve our services. Please take a few minutes to complete the written survey that you may receive in the mail after your visit with us. Thank you!             Your Updated Medication List - Protect others around you: Learn how to safely use, store and throw away your medicines at www.disposemymeds.org.          This list is accurate as of 18 11:59 PM.  Always use your  most recent med list.                   Brand Name Dispense Instructions for use Diagnosis    acetaminophen 325 MG tablet    TYLENOL    100 tablet    Take 2 tablets (650 mg) by mouth every 4 hours as needed for mild pain    Olecranon bursitis of left elbow       colestipol 1 g tablet    COLESTID     Take 1 mg by mouth 1-2 daily        IBUPROFEN PO      As needed        * naproxen 500 MG tablet    NAPROSYN    30 tablet    Take 1 tablet (500 mg) by mouth 2 times daily as needed for moderate pain    Hand injury, right, initial encounter       * naproxen 500 MG tablet    NAPROSYN    30 tablet    Take 1 tablet (500 mg) by mouth 2 times daily as needed for moderate pain    Injury of right hand, subsequent encounter       zolpidem 12.5 MG CR tablet    AMBIEN CR    30 tablet    Take 1 tablet (12.5 mg) by mouth nightly as needed for sleep    Insomnia, unspecified type       * Notice:  This list has 2 medication(s) that are the same as other medications prescribed for you. Read the directions carefully, and ask your doctor or other care provider to review them with you.

## 2018-05-04 NOTE — Clinical Note
Jesus Benson, I just wanted to send you my DA as an FYI. Alfredo will be working with me to address his depression symptoms. He will be processing dynamics in his marriage and I think he wants to make a decision about whether to reconcile or terminate the relationship. I will let you know if he is worsening or not improving and is thinking about medication. Please let me know if there is anything else that you would like me to address or be aware of. Thank you for the referral. Thanks, Janeen

## 2018-05-04 NOTE — PROGRESS NOTES
[facility]  Integrated Behavioral Health Services   Diagnostic Assessment      PATIENT'S NAME: Dustin Mccoy  MRN:   [id number]  :   1975  DATE OF SERVICE: May 4, 2018  SERVICE LOCATION: Face to Face in Clinic  Visit Activities: NEW and [facility] Only      Identifying Information:  Patient is a 42 year old year old, ,  male.  Patient attended the session alone.        Referral:  Patient was referred for an assessment by Marcelino Patrick PA-C at [facility].   Reason for referral: clarify behavioral health diagnosis and determine behavioral health treatment options.       Patient's Statement of Presenting Concern:  Patient reports the following reason(s) for seeking an assessment at this time: depression and relationship issues. Patient is currently struggling with depression symptoms. He reports difficulty with sleep onset. He states that he is only able to sleep a couple hours at a time. Patient is having difficulty with eating. He states that he has lost over 20lbs in the past couple weeks. He states that it's hard to work and focus. He has heard co-workers even comment on him not seeming the same. He has been more socially withdrawn. Patient states that his marriage hasn't been good for years and he feels that he is at the point where he may be making a decision as to whether he terminate or remain in the marriage. Patient stated that his symptoms have resulted in the following functional impairments: management of the household and or completion of tasks, relationship(s), self-care, social interactions and work / vocational responsibilities      History of Presenting Concern:  Patient reports that these problem(s) began about a month ago. He states that he has been experiencing a sense of hopelessness.  Patient has not attempted to resolve these concerns in the past. Patient reports that other professional(s) are involved in providing  "support / services. Patient's PCP referred patient to this writer, and also prescribed a medication to help with sleep, last week.  Patient states that he hasn't been happy in his marriage, for a long time. He reports that he has been unfaithful to his wife with another woman for the last 9 years. His parents are the only ones that know about it. He states that the other woman started to distance herself about one month ago. He identified that he has been experiencing feelings of guilt and abandonment. He states that he feels likes he's a \"horrible person\".    Social History:  Patient reported he grew up in [place], [place]. They were the first born of 4 children. Patient has a younger brother that is  and in his second marriage. Patient also has a twin sister. He also has another younger sister. Patient's parents are  and have been for about 45 years. Patient reported that his childhood was \"good\". [place].  Patient reported a history of 3 committed relationships or marriages. Patient has been  for 13 years and states that they have had issues off and on. Patient reported having 1 child; an 12yo daughter. Patient identified some stable and meaningful social connections.     Patient lives with his wife and their daughter.  Patient is currently employed full time.  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     Patient reported that he has not been involved with the legal system.  Patient's highest education level was associate degree / vocational certificate. Patient did identify the following learning problems: mathematics. He was held back one year in school as well. There are ethnic, cultural or Mandaen factors that may be relavent for therapy. These factors will be addressed in the Preliminary Treatment plan. Patient reports that he is Caodaism and his katia is important to him. Patient did not serve in the . "       Mental Health History:  Patient reported no family history of mental health issues. Patient has not received mental health services in the past. Patient is not currently receiving any mental health services.      Chemical Health History:  Patient reported no family history of chemical health issues. Patient has not received chemical dependency treatment in the past. Patient is not currently receiving any chemical dependency treatment. Patient reports no problems as a result of their drinking / drug use.  Patient reports use of alcohol as every couple weeks or so and maybe two drinks at a time.  Patient denies use of cannabis and other illicit drugs.  Patient denies use of tobacco.  Patient reports use of caffeine, one coffee every few nights, has cut back on pop intake.    Cage-AID score is: negative. Based on Cage-Aid score and clinical interview there  are not indications of drug or alcohol abuse.      Discussed the general effects of drugs and alcohol on health and well-being.      Significant Losses / Trauma / Abuse / Neglect Issues:  There are no indications or report of: significant losses, trauma, abuse or neglect.    Issues of possible neglect are not present.      Medical History:   Patient Active Problem List   Diagnosis     H/O irritable bowel syndrome     Atypical chest pain     Olecranon bursitis of left elbow     Cellulitis of left upper extremity     Cellulitis of left elbow     Contusion of right hand, subsequent encounter       Medication Review:  Current Outpatient Prescriptions   Medication     acetaminophen (TYLENOL) 325 MG tablet     colestipol (COLESTID) 1 G tablet     IBUPROFEN PO     naproxen (NAPROSYN) 500 MG tablet     naproxen (NAPROSYN) 500 MG tablet     zolpidem (AMBIEN CR) 12.5 MG CR tablet     No current facility-administered medications for this visit.        Patient was provided recommendation to follow-up with physician.    Mental Status Assessment:  Appearance:   Appropriate    Eye Contact:   Fair   Psychomotor Behavior: Normal   Attitude:   Cooperative   Orientation:   All  Speech   Rate / Production: Monotone    Volume:  Normal   Mood:    Depressed   Affect:    Flat   Thought Content:  Clear   Thought Form:  Coherent  Logical   Insight:    Good       Safety Assessment:    Patient denies a history of suicidal ideation, suicide attempts, self-injurious behavior, homicidal ideation, homicidal behavior and and other safety concerns  Patient denies current or recent suicidal ideation or behaviors.  Patient denies current or recent homicidal ideation or behaviors.  Patient denies current or recent self injurious behavior or ideation.  Patient denies other safety concerns.  Patient reports there are firearms in the house. The firearms are secured in a locked space  Protective Factors Children in the home , Sense of responsibility to family, Reality testing ability, Positive coping skills and Positive social support   Risk Factors Current high stress      Plan for Safety and Risk Management:  A safety and risk management plan has not been developed at this time, however patient was encouraged to call [place] [phone] should there be a change in any of these risk factors.      Review of Symptoms:  Depression: Sleep Interest Guilt Energy Concentration Appetite Hopeless Ruminations Irritability  Norma:  No symptoms  Psychosis: No symptoms  Anxiety: Worries Nervousness related to marital issues  Panic:  No symptoms  Post Traumatic Stress Disorder: No symptoms  Obsessive Compulsive Disorder: No symptoms  Eating Disorder: No symptoms  Oppositional Defiant Disorder: No symptoms  ADD / ADHD: No symptoms  Conduct Disorder: No symptoms    Patient's Strengths and Limitations:  Patient identified the following strengths or resources that will help him succeed in counseling: katia / spirituality, friends / good social support and family support. Patient identified the following supports: parents, dad  especially. Things that may interfere with the patien'ts success in behavioral health services include:work schedule.    Diagnostic Criteria:  A) Single episode - symptoms have been present during the same 2-week period and represent a change from previous functioning 5 or more symptoms (required for diagnosis)   - Depressed mood. Note: In children and adolescents, can be irritable mood.     - Diminished interest or pleasure in all, or almost all, activities.    - Significant weight loss when not dieting decrease in appetite.    - Decreased sleep.    - Fatigue or loss of energy.    - Feelings of worthlessness or excessive guilt.    - Diminished ability to think or concentrate, or indecisiveness.   B) The symptoms cause clinically significant distress or impairment in social, occupational, or other important areas of functioning  C) The episode is not attributable to the physiological effects of a substance or to another medical condition  D) The occurence of major depressive episode is not better explained by other thought / psychotic disorders  E) There has never been a manic episode or hypomanic episode  A. The development of emotional or behavioral symptoms in response to an identifiable stressor(s) occurring within 3 months of the onset of the stressor(s)  B. These symptoms or behaviors are clinically significant, as evidenced by one or both of the following:       - Significant impairment in social, occupational, or other important areas of functioning  C. The stress-related disturbance does not meet criteria for another disorder & is not not an exacerbation of another mental disorder  D. The symptoms do not represent normal bereavement  E. Once the stressor or its consequences have terminated, the symptoms do not persist for more than an additional 6 months       * Adjustment Disorder with Anxiety: The predominant manfestations are symptoms such as nervousness, worry, or jitteriness, or, in children separation  anxiety from major attachment figures      Functional Status:  Patient's symptoms are causing reduced functional status in the following areas: Activities of Daily Living - little motivation and energy to complete tasks, difficulty with sleep onset, low appetite and weight loss  Occupational / Vocational - difficulty with concentration, lack of interest, co-workers have noticed a change in demeanor  Social / Relational - more withdrawn, marital discord      DSM5 Diagnoses: (Sustained by DSM5 Criteria Listed Above)  Diagnoses: 296.23 (F32.2) Major Depressive Disorder, Single Episode, Severe With anxious distress  Adjustment Disorders  309.24 (F43.22) With anxiety  Psychosocial & Contextual Factors: marital distress  WHODAS Score: 18  See Media section of EPIC medical record for completed WHODAS    Preliminary Treatment Plan:    The client reports no currently identified Holiness, ethnic or cultural issues relevant to therapy.    Initial Treatment will focus on: Depressed Mood - anhedonia, low energy, feelings of guilt and hopelessness, concentration difficulty, sleep disturbance, decreased appetite  Adjustment Difficulties related to: marital distress  Relational Problems related to: Conflict or difficulties with partner/spouse.    Chemical dependency recommendations: No indications of CD issues    As a preliminary treatment goal, patient will experience a reduction in depressed mood, will develop more effective coping skills to manage depressive symptoms, will develop healthy cognitive patterns and beliefs and will increase ability to function adaptively, will develop coping/problem-solving skills to facilitate more adaptive adjustment and will address relationship difficulties in a more adaptive manner.    The focus of initial interventions will be to alleviate anxiety, alleviate depressed mood, increase ability to function adaptively, increase coping skills, increase self esteem, process losses, teach CBT skills,  teach communication skills, teach relaxation strategies and teach sleep hygiene.    The patient is receiving treatment / structured support from the following professional(s) / service and treatment. Collaboration will be initiated with: primary care physician.    Referral to another professional/service is not indicated at this time..    A Release of Information is not needed at this time.    Report to child or adult protection services was NA.    MICHELLE Denis, Behavioral Health Clinician

## 2018-05-05 ASSESSMENT — ANXIETY QUESTIONNAIRES: GAD7 TOTAL SCORE: 11

## 2018-05-08 ENCOUNTER — OFFICE VISIT (OUTPATIENT)
Dept: BEHAVIORAL HEALTH | Facility: CLINIC | Age: 43
End: 2018-05-08
Payer: COMMERCIAL

## 2018-05-08 DIAGNOSIS — F32.2 SEVERE SINGLE CURRENT EPISODE OF MAJOR DEPRESSIVE DISORDER, WITHOUT PSYCHOTIC FEATURES (H): Primary | ICD-10-CM

## 2018-05-08 DIAGNOSIS — F43.22 ADJUSTMENT DISORDER WITH ANXIOUS MOOD: ICD-10-CM

## 2018-05-08 PROCEDURE — 90834 PSYTX W PT 45 MINUTES: CPT | Performed by: MARRIAGE & FAMILY THERAPIST

## 2018-05-08 NOTE — MR AVS SNAPSHOT
"              After Visit Summary   2018    Dustin Mccoy    MRN: [id number]           Patient Information     Date Of Birth          1975        Visit Information        Provider Department      2018 8:30 AM Janeen Harry LMFT [facility]        Today's Diagnoses     Severe single current episode of major depressive disorder, without psychotic features (H)    -  1    Adjustment disorder with anxious mood           Follow-ups after your visit        Who to contact     If you have questions or need follow up information about today's clinic visit or your schedule please contact [facility] directly at [phone].  Normal or non-critical lab and imaging results will be communicated to you by Art-Exchangehart, letter or phone within 4 business days after the clinic has received the results. If you do not hear from us within 7 days, please contact the clinic through Art-Exchangehart or phone. If you have a critical or abnormal lab result, we will notify you by phone as soon as possible.  Submit refill requests through PureCars or call your pharmacy and they will forward the refill request to us. Please allow 3 business days for your refill to be completed.          Additional Information About Your Visit        MyChart Information     PureCars lets you send messages to your doctor, view your test results, renew your prescriptions, schedule appointments and more. To sign up, go to [email or web address] . Click on \"Log in\" on the left side of the screen, which will take you to the Welcome page. Then click on \"Sign up Now\" on the right side of the page.     You will be asked to enter the access code listed below, as well as some personal information. Please follow the directions to create your username and password.     Your access code is: [email or web address]  Expires: 2018 10:16 AM     Your access code will  in 90 days. If you need help or a new code, please call your [place] " clinic or [phone].        Care EveryWhere ID     This is your Care EveryWhere ID. This could be used by other organizations to access your [facility] medical records  [id number]         Blood Pressure from Last 3 Encounters:   04/26/18 128/80   11/16/17 118/74   10/10/17 124/80    Weight from Last 3 Encounters:   04/26/18 102.5 kg (225 lb 14.4 oz)   11/29/17 108 kg (238 lb)   11/16/17 108.1 kg (238 lb 6.4 oz)              Today, you had the following     No orders found for display       Primary Care Provider Fax #    Physician No Ref-Primary [phone]       No address on file        Equal Access to Services     LOKESH HUSAIN : Eugene Cole, drake parker, qacarley kaalmamelina hopkins, jose antonio roberson . So [facility] [phone].    ATENCIÓN: Si habla español, tiene a larry disposición servicios gratuitos de asistencia lingüística. Llame al [phone].    We comply with applicable federal civil rights laws and [place] laws. We do not discriminate on the basis of race, color, national origin, age, disability, sex, sexual orientation, or gender identity.            Thank you!     Thank you for choosing [facility]  for your care. Our goal is always to provide you with excellent care. Hearing back from our patients is one way we can continue to improve our services. Please take a few minutes to complete the written survey that you may receive in the mail after your visit with us. Thank you!             Your Updated Medication List - Protect others around you: Learn how to safely use, store and throw away your medicines at www.disposemymeds.org.          This list is accurate as of 5/8/18 10:59 AM.  Always use your most recent med list.                   Brand Name Dispense Instructions for use Diagnosis    acetaminophen 325 MG tablet    TYLENOL    100 tablet    Take 2 tablets (650 mg) by mouth every 4 hours as needed for mild pain    Olecranon bursitis of left  elbow       colestipol 1 g tablet    COLESTID     Take 1 mg by mouth 1-2 daily        IBUPROFEN PO      As needed        * naproxen 500 MG tablet    NAPROSYN    30 tablet    Take 1 tablet (500 mg) by mouth 2 times daily as needed for moderate pain    Hand injury, right, initial encounter       * naproxen 500 MG tablet    NAPROSYN    30 tablet    Take 1 tablet (500 mg) by mouth 2 times daily as needed for moderate pain    Injury of right hand, subsequent encounter       zolpidem 12.5 MG CR tablet    AMBIEN CR    30 tablet    Take 1 tablet (12.5 mg) by mouth nightly as needed for sleep    Insomnia, unspecified type       * Notice:  This list has 2 medication(s) that are the same as other medications prescribed for you. Read the directions carefully, and ask your doctor or other care provider to review them with you.

## 2018-05-08 NOTE — PROGRESS NOTES
[facility]  May 8, 2018      Behavioral Health Clinician Progress Note    Patient Name: Dustin Mccoy           Service Type:  Individual      Service Location:   Face to Face in Clinic     Session Start Time: 8:45  Session End Time: 9:37      Session Length: 38 - 52      Attendees: Patient    Visit Activities (Refresh list every visit): [facility] Only    Diagnostic Assessment Date: 5/4/18  Treatment Plan Review Date: due next visit  See Flowsheets for today's PHQ-9 and MAIK-7 results  Previous PHQ-9:   PHQ-9 SCORE 4/26/2018   Total Score 20     Previous MAIK-7:   MAIK-7 SCORE 5/4/2018   Total Score 11       KAMINI LEVEL:  KAMINI Score (Last Two) 11/19/2014   KAMINI Raw Score 47   Activation Score 77.5   KAMINI Level 4       DATA  Extended Session (60+ minutes): No  Interactive Complexity: No  Crisis: No  State mental health facility Patient: No    Treatment Objective(s) Addressed in This Session:  Target Behavior(s): depression, anxiety and relationship issues    Depressed Mood: Increase interest, engagement, and pleasure in doing things  Decrease frequency and intensity of feeling down, depressed, hopeless  Improve quantity and quality of night time sleep / decrease daytime naps  Feel less tired and more energy during the day   Improve diet, appetite, mindful eating, and / or meal planning  Identify negative self-talk and behaviors: challenge core beliefs, myths, and actions  Improve concentration, focus, and mindfulness in daily activities   Feel less fidgety, restless or slow in daily activities / interpersonal interactions  Anxiety: will experience a reduction in anxiety, will develop more effective coping skills to manage anxiety symptoms, will develop healthy cognitive patterns and beliefs and will increase ability to function adaptively  Adjustment Difficulties: will develop coping/problem-solving skills to facilitate more adaptive adjustment  Relationship Problems: will address relationship difficulties in a more  adaptive manner    Current Stressors / Issues:  Patient states that he is not sleeping or eating well. With the sleeping med he is only getting 2-3 hours of sleep. He went to the [place] ED with chest pain over this past weekend. He states that he was told it was more of an anxiety reaction. He identified that the day prior to going to the ED he found the woman he had been unfaithful with, with another man. He states that he felt betrayed and devastated. He states that his symptoms worsened after that as he felt that he lost someone that was previously a support to him. Patient states that he has not been able to work. He brought in FMLA forms and this writer agreed that it was appropriate that they be filled out. Patient states that he will be meeting with an  to get information about the divorce process.     Patient identified that he has support from his , his parents and another friend. Provided psycho-education on cognitive distortions and ways to challenge them and stay mindful of the present. Encouraged time spent with others he trusts and feels supported by.     Progress on Treatment Objective(s) / Homework:  Worsening - PREPARATION (Decided to change - considering how); Intervened by negotiating a change plan and determining options / strategies for behavior change, identifying triggers, exploring social supports, and working towards setting a date to begin behavior change    Motivational Interviewing    MI Intervention: Expressed Empathy/Understanding, Supported Autonomy, Collaboration, Evocation, Permission to raise concern or advise, Open-ended questions, Reflections: simple and complex, Change talk (evoked) and Reframe     Change Talk Expressed by the Patient: Desire to change Ability to change Reasons to change Need to change Committment to change Activation Taking steps    Provider Response to Change Talk: E - Evoked more info from patient about behavior change, A - Affirmed  patient's thoughts, decisions, or attempts at behavior change, R - Reflected patient's change talk and S - Summarized patient's change talk statements    Also provided psychoeducation about behavioral health condition, symptoms, and treatment options    Care Plan review completed: Yes    Medication Review:  No current psychiatric medications prescribed Patient will continue to take the Ambien. Discussed consideration of medication; patient continues to want to go without at this time.    Medication Compliance:  NA    Changes in Health Issues:   None reported    Chemical Use Review:   Substance Use: Chemical use reviewed, no active concerns identified      Tobacco Use: No current tobacco use.      Assessment: Current Emotional / Mental Status (status of significant symptoms):  Risk status (Self / Other harm or suicidal ideation)  Patient denies a history of suicidal ideation, suicide attempts, self-injurious behavior, homicidal ideation, homicidal behavior and and other safety concerns  Patient denies current fears or concerns for personal safety.  Patient denies current or recent suicidal ideation or behaviors.  Patient denies current or recent homicidal ideation or behaviors.  Patient denies current or recent self injurious behavior or ideation.  Patient denies other safety concerns.  A safety and risk management plan has not been developed at this time, however patient was encouraged to call [place] [phone] should there be a change in any of these risk factors.    Appearance:   Appropriate   Eye Contact:   Poor  Psychomotor Behavior: Restless   Attitude:   Cooperative   Orientation:   All  Speech   Rate / Production: Monotone    Volume:  Normal   Mood:    Anxious  Depressed  Sad   Affect:    Flat  Restricted   Thought Content:  Clear   Thought Form:  Coherent  Logical   Insight:    Good     Diagnoses:  1. Severe single current episode of major depressive disorder, without psychotic features (H)    2.  Adjustment disorder with anxious mood        Collateral Reports Completed:  FMLA forms completed    Plan: (Homework, other):  Patient was given information about behavioral services and encouraged to schedule a follow up appointment with the clinic [facility] in 1 week.  He was also given information about mental health symptoms and treatment options  and Cognitive Behavioral Therapy skills to practice when experiencing anxiety and depression.  CD Recommendations: No indications of CD issues.  MICHELLE Mott, [facility]

## 2018-05-15 ENCOUNTER — OFFICE VISIT (OUTPATIENT)
Dept: BEHAVIORAL HEALTH | Facility: CLINIC | Age: 43
End: 2018-05-15
Payer: COMMERCIAL

## 2018-05-15 DIAGNOSIS — F43.22 ADJUSTMENT DISORDER WITH ANXIOUS MOOD: ICD-10-CM

## 2018-05-15 DIAGNOSIS — F32.2 SEVERE SINGLE CURRENT EPISODE OF MAJOR DEPRESSIVE DISORDER, WITHOUT PSYCHOTIC FEATURES (H): Primary | ICD-10-CM

## 2018-05-15 PROCEDURE — 90834 PSYTX W PT 45 MINUTES: CPT | Performed by: MARRIAGE & FAMILY THERAPIST

## 2018-05-15 NOTE — MR AVS SNAPSHOT
"              After Visit Summary   5/15/2018    Dustin Mccoy    MRN: [id number]           Patient Information     Date Of Birth          1975        Visit Information        Provider Department      5/15/2018 11:00 AM Janeen Harry LMFT [facility]        Today's Diagnoses     Severe single current episode of major depressive disorder, without psychotic features (H)    -  1    Adjustment disorder with anxious mood           Follow-ups after your visit        Who to contact     If you have questions or need follow up information about today's clinic visit or your schedule please contact [facility] directly at [phone].  Normal or non-critical lab and imaging results will be communicated to you by [employer], letter or phone within 4 business days after the clinic has received the results. If you do not hear from us within 7 days, please contact the clinic through [employer] or phone. If you have a critical or abnormal lab result, we will notify you by phone as soon as possible.  Submit refill requests through [employer] or call your pharmacy and they will forward the refill request to us. Please allow 3 business days for your refill to be completed.          Additional Information About Your Visit        MyChart Information     [employer] lets you send messages to your doctor, view your test results, renew your prescriptions, schedule appointments and more. To sign up, go to [email or web address] . Click on \"Log in\" on the left side of the screen, which will take you to the Welcome page. Then click on \"Sign up Now\" on the right side of the page.     You will be asked to enter the access code listed below, as well as some personal information. Please follow the directions to create your username and password.     Your access code is: [email or web address]  Expires: 2018 10:16 AM     Your access code will  in 90 days. If you need help or a new code, please call your [facility] " clinic or [phone].        Care EveryWhere ID     This is your Care EveryWhere ID. This could be used by other organizations to access your [facility] medical records  [id number]         Blood Pressure from Last 3 Encounters:   04/26/18 128/80   11/16/17 118/74   10/10/17 124/80    Weight from Last 3 Encounters:   04/26/18 102.5 kg (225 lb 14.4 oz)   11/29/17 108 kg (238 lb)   11/16/17 108.1 kg (238 lb 6.4 oz)              Today, you had the following     No orders found for display       Primary Care Provider Fax #    Physician No Ref-Primary [phone]       No address on file        Equal Access to Services     LOKESH HUSAIN : Eugene Cole, drake parker, qacarley kaalmamleina hopkins, jose antonio roberson . So [facility] [phone].    ATENCIÓN: Si habla español, tiene a larry disposición servicios gratuitos de asistencia lingüística. Llame al [phone].    We comply with applicable federal civil rights laws and [place] laws. We do not discriminate on the basis of race, color, national origin, age, disability, sex, sexual orientation, or gender identity.            Thank you!     Thank you for choosing [facility]  for your care. Our goal is always to provide you with excellent care. Hearing back from our patients is one way we can continue to improve our services. Please take a few minutes to complete the written survey that you may receive in the mail after your visit with us. Thank you!             Your Updated Medication List - Protect others around you: Learn how to safely use, store and throw away your medicines at www.disposemymeds.org.          This list is accurate as of 5/15/18 11:59 PM.  Always use your most recent med list.                   Brand Name Dispense Instructions for use Diagnosis    acetaminophen 325 MG tablet    TYLENOL    100 tablet    Take 2 tablets (650 mg) by mouth every 4 hours as needed for mild pain    Olecranon bursitis of left  elbow       colestipol 1 g tablet    COLESTID     Take 1 mg by mouth 1-2 daily        IBUPROFEN PO      As needed        * naproxen 500 MG tablet    NAPROSYN    30 tablet    Take 1 tablet (500 mg) by mouth 2 times daily as needed for moderate pain    Hand injury, right, initial encounter       * naproxen 500 MG tablet    NAPROSYN    30 tablet    Take 1 tablet (500 mg) by mouth 2 times daily as needed for moderate pain    Injury of right hand, subsequent encounter       zolpidem 12.5 MG CR tablet    AMBIEN CR    30 tablet    Take 1 tablet (12.5 mg) by mouth nightly as needed for sleep    Insomnia, unspecified type       * Notice:  This list has 2 medication(s) that are the same as other medications prescribed for you. Read the directions carefully, and ask your doctor or other care provider to review them with you.

## 2018-05-15 NOTE — PROGRESS NOTES
[facility]  May 15, 2018      Behavioral Health Clinician Progress Note    Patient Name: Dustin Mccoy           Service Type:  Individual      Service Location:   Face to Face in Clinic     Session Start Time: 11:15  Session End Time: 11:55      Session Length: 38 - 52      Attendees: Patient    Visit Activities (Refresh list every visit): [facility] Only    Diagnostic Assessment Date: 5/4/18  Treatment Plan Review Date: 5/15/2018  See Flowsheets for today's PHQ-9 and MAIK-7 results  Previous PHQ-9:   PHQ-9 SCORE 4/26/2018   Total Score 20     Previous MAIK-7:   MAIK-7 SCORE 5/4/2018   Total Score 11       KAMINI LEVEL:  KAMINI Score (Last Two) 11/19/2014   KAMINI Raw Score 47   Activation Score 77.5   KAMINI Level 4       DATA  Extended Session (60+ minutes): No  Interactive Complexity: No  Crisis: No  [facility] Patient: No    Treatment Objective(s) Addressed in This Session:  Target Behavior(s): depression, anxiety and relationship issues    Depressed Mood: Increase interest, engagement, and pleasure in doing things  Decrease frequency and intensity of feeling down, depressed, hopeless  Improve quantity and quality of night time sleep / decrease daytime naps  Feel less tired and more energy during the day   Improve diet, appetite, mindful eating, and / or meal planning  Identify negative self-talk and behaviors: challenge core beliefs, myths, and actions  Improve concentration, focus, and mindfulness in daily activities   Feel less fidgety, restless or slow in daily activities / interpersonal interactions  Anxiety: will experience a reduction in anxiety, will develop more effective coping skills to manage anxiety symptoms, will develop healthy cognitive patterns and beliefs and will increase ability to function adaptively  Adjustment Difficulties: will develop coping/problem-solving skills to facilitate more adaptive adjustment  Relationship Problems: will address relationship difficulties in a more  "adaptive manner    Current Stressors / Issues:  Patient reports that he is figuring out next steps. He states that he \"feels stuck\". He states that he met with the  and his dad went with him. He states that he's not really sure what he will do. Patient reports that he opened up to his wife about the infidelity. He states that he has also talked with his daughter about things. He states that he wanted to be honest with her about what is going on.     Discussed how marital dynamics have changed as a result. She asked about whether they should do counseling.     Weighed options of counseling vs not. Also suggested considering use of mediation. Patient has not returned to work and feels that he may need another week off as he is still having difficulty with not sleeping, concentration difficulty and lack of energy.        Progress on Treatment Objective(s) / Homework:  Worsening - PREPARATION (Decided to change - considering how); Intervened by negotiating a change plan and determining options / strategies for behavior change, identifying triggers, exploring social supports, and working towards setting a date to begin behavior change    Motivational Interviewing    MI Intervention: Expressed Empathy/Understanding, Supported Autonomy, Collaboration, Evocation, Permission to raise concern or advise, Open-ended questions, Reflections: simple and complex, Change talk (evoked) and Reframe     Change Talk Expressed by the Patient: Desire to change Ability to change Reasons to change Need to change Committment to change Activation Taking steps    Provider Response to Change Talk: E - Evoked more info from patient about behavior change, A - Affirmed patient's thoughts, decisions, or attempts at behavior change, R - Reflected patient's change talk and S - Summarized patient's change talk statements    Also provided psychoeducation about behavioral health condition, symptoms, and treatment options    Skills " training    Explored skills useful to client in current situation    Skills include assertiveness, communication, conflict management, problem-solving, relaxation, etc.    Solution-Focused Therapy    Explored patterns in patient's relationships and discussed options for new behaviors    Explored patterns in patient's actions and choices and discussed options for new behaviors    Cognitive-behavioral Therapy    Discussed common cognitive distortions, identified them in patient's life    Explored ways to challenge, replace, and act against these cognitions    Acceptance and Commitment Therapy    Explored and identified important values in patient's life    Discussed ways to commit to behavioral activation around these values    Psychodynamic psychotherapy    Discussed patient's emotional dynamics and issues and how they impact behaviors    Explored patient's history of relationships and how they impact present behaviors    Explored how to work with and make changes in these schemas and patterns    Behavioral Activation    Discussed steps patient can take to become more involved in meaningful activity    Identified barriers to these activities and explored possible solutions    Mindfulness-Based Strategies    Discussed skills based on development and application of mindfulness    Skills drawn from dialectical behavior therapy, mindfulness-based stress reduction, mindfulness-based cognitive therapy, etc.      Care Plan review completed: Yes    Medication Review:  No current psychiatric medications prescribed Patient will continue to take the Ambien. Discussed consideration of medication; patient continues to want to go without at this time.    Medication Compliance:  NA    Changes in Health Issues:   None reported    Chemical Use Review:   Substance Use: Chemical use reviewed, no active concerns identified      Tobacco Use: No current tobacco use.      Assessment: Current Emotional / Mental Status (status of significant  symptoms):  Risk status (Self / Other harm or suicidal ideation)  Patient denies a history of suicidal ideation, suicide attempts, self-injurious behavior, homicidal ideation, homicidal behavior and and other safety concerns  Patient denies current fears or concerns for personal safety.  Patient denies current or recent suicidal ideation or behaviors.  Patient denies current or recent homicidal ideation or behaviors.  Patient denies current or recent self injurious behavior or ideation.  Patient denies other safety concerns.  A safety and risk management plan has not been developed at this time, however patient was encouraged to call [place] [phone] should there be a change in any of these risk factors.    Appearance:   Appropriate   Eye Contact:   Poor  Psychomotor Behavior: Restless   Attitude:   Cooperative   Orientation:   All  Speech   Rate / Production: Monotone    Volume:  Normal   Mood:    Anxious  Depressed  Sad   Affect:    Flat   Thought Content:  Clear   Thought Form:  Coherent  Logical   Insight:    Good     Diagnoses:  1. Severe single current episode of major depressive disorder, without psychotic features (H)    2. Adjustment disorder with anxious mood        Collateral Reports Completed:  Not Applicable    Plan: (Homework, other):  Patient was given information about behavioral services and encouraged to schedule a follow up appointment with the clinic [facility] in 1 week.  He was also given information about mental health symptoms and treatment options  and Cognitive Behavioral Therapy skills to practice when experiencing anxiety and depression.  CD Recommendations: No indications of CD issues.  MICHELLE Mott, [facility]      ______________________________________________________________________    Integrated Primary Care Behavioral Health Treatment Plan    Patient's Name: Dustin Mccoy  YOB: 1975    Date: May 15, 2018    DSM-V Diagnoses: 296.23 (F32.2) Major Depressive Disorder,  Single Episode, Severe With anxious distress or Adjustment Disorders  309.24 (F43.22) With anxiety  Psychosocial / Contextual Factors: marital distress  WHODAS: 18    Referral / Collaboration:  Referral to another professional/service is not indicated at this time..    Anticipated number of session or this episode of care: 10      MeasurableTreatment Goal(s) related to diagnosis / functional impairment(s)  Goal 1: Patient will effectively reduce depressive symptoms as evidenced by a reduced PHQ9 score of 5 or less with occurrence of several days or less.        Objective #A (Patient Action)    Patient will Decrease frequency and intensity of feeling down, depressed, hopeless  Identify negative self-talk and behaviors: challenge core beliefs, myths, and actions  Improve concentration, focus, and mindfulness in daily activities   Status: New - Date: 5/15/18     Intervention(s)  [facility] will teach patient about cognitive distortions and ways to appropriately challenge and restructure statements. Encourage patient to use coping statements and positive affirmations.    Objective #B  Patient will use at least 3 coping skills for anxiety management in the next 6 weeks  Improve quantity and quality of night time sleep / decrease daytime naps  Feel less tired and more energy during the day   Status: New - Date: 5/15/18     Intervention(s)  [facility] will teach patient strategies to manage anxiety, such as relaxation and mindfulness. [facility] will also provide psycho-education on sleep hygiene practices..    Objective #C  Patient will process changes in relationships and set appropriate boundaries.  Status: New- 5/15/18    Intervention(s)  [facility] will help patient process relationship issues. Teach patient ways to set healthy relationship boundaries. Teach and encourage use of assertive skills.    Patient has reviewed and agreed to the above plan.    Written by  MICHELLE Mott, [facility]

## 2018-05-17 ENCOUNTER — TELEPHONE (OUTPATIENT)
Dept: FAMILY MEDICINE | Facility: CLINIC | Age: 43
End: 2018-05-17

## 2018-05-17 NOTE — LETTER
[address] [address]  Phone: [phone]  Fax: [phone]    May 17, 2018        Dustin Mccoy  [address] [facility] [address]          To whom it may concern:    RE: Dustin Mccoy      Patient was seen in clinic earlier this week and will need to be excused from work for another week due to reasons related to his FMLA. He will be able to return to work on 5/28/18.      Please contact me for questions or concerns.      Sincerely,        MICHELLE Denis

## 2018-05-17 NOTE — LETTER
[address] [address]  Phone: [phone]  Fax: [phone]    May 17, 2018        Dustin Mccoy  [address] [facility] [address]          To whom it may concern:    RE: Dustin Mccoy    Patient was seen in clinic earlier this week and will need to be excused from work for another week due to personal issues related to health and well being. He will be able to return to work on 5/28/18.    Please contact me for questions or concerns.      Sincerely,        MICHELLE Denis

## 2018-05-17 NOTE — TELEPHONE ENCOUNTER
Reason for Call:  Other note for work     Detailed comments: Pt is needing a note to excuse him from work for another week in regards to his FMLA. Will  at  in [place].     Phone Number Patient can be reached at: [phone]    Best Time: any     Can we leave a detailed message on this number? YES    Call taken on 5/17/2018 at 9:46 AM by Bertha Hahn

## 2018-05-17 NOTE — TELEPHONE ENCOUNTER
Phone Encounter   [facility] for patient that this has been done and his letter will be at the  for him to  at his convenience.

## 2018-05-22 ENCOUNTER — OFFICE VISIT (OUTPATIENT)
Dept: BEHAVIORAL HEALTH | Facility: CLINIC | Age: 43
End: 2018-05-22
Payer: COMMERCIAL

## 2018-05-22 DIAGNOSIS — F43.22 ADJUSTMENT DISORDER WITH ANXIOUS MOOD: ICD-10-CM

## 2018-05-22 DIAGNOSIS — F32.2 SEVERE SINGLE CURRENT EPISODE OF MAJOR DEPRESSIVE DISORDER, WITHOUT PSYCHOTIC FEATURES (H): Primary | ICD-10-CM

## 2018-05-22 PROCEDURE — 90834 PSYTX W PT 45 MINUTES: CPT | Performed by: MARRIAGE & FAMILY THERAPIST

## 2018-05-22 NOTE — PROGRESS NOTES
[facility]  May 22, 2018      Behavioral Health Clinician Progress Note    Patient Name: Dustin Mccoy           Service Type:  Individual      Service Location:   Face to Face in Clinic     Session Start Time: 8:00  Session End Time: 8:48      Session Length: 38 - 52      Attendees: Patient    Visit Activities (Refresh list every visit): [facility] Only    Diagnostic Assessment Date: 5/4/18  Treatment Plan Review Date: 5/15/2018  See Flowsheets for today's PHQ-9 and MAIK-7 results  Previous PHQ-9:   PHQ-9 SCORE 4/26/2018   Total Score 20     Previous MAIK-7:   MAIK-7 SCORE 5/4/2018   Total Score 11       KAMINI LEVEL:  KAMINI Score (Last Two) 11/19/2014   KAMINI Raw Score 47   Activation Score 77.5   KAMINI Level 4       DATA  Extended Session (60+ minutes): No  Interactive Complexity: No  Crisis: No  [facility] Patient: No    Treatment Objective(s) Addressed in This Session:  Target Behavior(s): depression, anxiety and relationship issues    Depressed Mood: Increase interest, engagement, and pleasure in doing things  Decrease frequency and intensity of feeling down, depressed, hopeless  Improve quantity and quality of night time sleep / decrease daytime naps  Feel less tired and more energy during the day   Improve diet, appetite, mindful eating, and / or meal planning  Identify negative self-talk and behaviors: challenge core beliefs, myths, and actions  Improve concentration, focus, and mindfulness in daily activities   Feel less fidgety, restless or slow in daily activities / interpersonal interactions  Anxiety: will experience a reduction in anxiety, will develop more effective coping skills to manage anxiety symptoms, will develop healthy cognitive patterns and beliefs and will increase ability to function adaptively  Adjustment Difficulties: will develop coping/problem-solving skills to facilitate more adaptive adjustment  Relationship Problems: will address relationship difficulties in a more adaptive  manner    Current Stressors / Issues:  Patient reports that he and his spouse have talked more about divorce. He continues to feel stuck, though believes he has decided to proceed with a divorce.   Patient identified that he is having the most difficulty with the relationship with the other woman. He states that having been in a relationship with her for 9 years, he is having difficulty knowing the status of their relationship. Patient has turned to good friends for support. He hasn't felt ready to return to work, though plans to return next week. Patient continues to struggle with sleep. He has tried use of Ambien, twice, though he will wake 3-4 hours after taking it and is unable to return to sleep. He believes that his mind is too busy for it to work.    Discussed boundary setting and encouraged identifying boundaries that he would like to set to help him better focus on his needs and getting back to work. Discussed interpersonal effectiveness skills to help him better communicate in his relationships.      Progress on Treatment Objective(s) / Homework:  Minimal progress - PREPARATION (Decided to change - considering how); Intervened by negotiating a change plan and determining options / strategies for behavior change, identifying triggers, exploring social supports, and working towards setting a date to begin behavior change    Motivational Interviewing    MI Intervention: Expressed Empathy/Understanding, Supported Autonomy, Collaboration, Evocation, Permission to raise concern or advise, Open-ended questions, Reflections: simple and complex, Change talk (evoked) and Reframe     Change Talk Expressed by the Patient: Desire to change Ability to change Reasons to change Need to change Committment to change Activation Taking steps    Provider Response to Change Talk: E - Evoked more info from patient about behavior change, A - Affirmed patient's thoughts, decisions, or attempts at behavior change, R - Reflected  patient's change talk and S - Summarized patient's change talk statements    Also provided psychoeducation about behavioral health condition, symptoms, and treatment options    Skills training    Explored skills useful to client in current situation    Skills include assertiveness, communication, conflict management, problem-solving, relaxation, etc.    Solution-Focused Therapy    Explored patterns in patient's relationships and discussed options for new behaviors    Explored patterns in patient's actions and choices and discussed options for new behaviors    Cognitive-behavioral Therapy    Discussed common cognitive distortions, identified them in patient's life    Explored ways to challenge, replace, and act against these cognitions    Acceptance and Commitment Therapy    Explored and identified important values in patient's life    Discussed ways to commit to behavioral activation around these values    Psychodynamic psychotherapy    Discussed patient's emotional dynamics and issues and how they impact behaviors    Explored patient's history of relationships and how they impact present behaviors    Explored how to work with and make changes in these schemas and patterns    Behavioral Activation    Discussed steps patient can take to become more involved in meaningful activity    Identified barriers to these activities and explored possible solutions    Mindfulness-Based Strategies    Discussed skills based on development and application of mindfulness    Skills drawn from dialectical behavior therapy, mindfulness-based stress reduction, mindfulness-based cognitive therapy, etc.      Care Plan review completed: Yes    Medication Review:  No current psychiatric medications prescribed Patient will consider use of Ambien. Discussed consideration of medication; patient continues to want to go without at this time.    Medication Compliance:  NA    Changes in Health Issues:   None reported    Chemical Use  Review:   Substance Use: Chemical use reviewed, no active concerns identified      Tobacco Use: No current tobacco use.      Assessment: Current Emotional / Mental Status (status of significant symptoms):  Risk status (Self / Other harm or suicidal ideation)  Patient denies a history of suicidal ideation, suicide attempts, self-injurious behavior, homicidal ideation, homicidal behavior and and other safety concerns  Patient denies current fears or concerns for personal safety.  Patient denies current or recent suicidal ideation or behaviors.  Patient denies current or recent homicidal ideation or behaviors.  Patient denies current or recent self injurious behavior or ideation.  Patient denies other safety concerns.  A safety and risk management plan has not been developed at this time, however patient was encouraged to call [place] [phone] should there be a change in any of these risk factors.    Appearance:   Appropriate   Eye Contact:   Poor  Psychomotor Behavior: Restless   Attitude:   Cooperative   Orientation:   All  Speech   Rate / Production: Monotone    Volume:  Normal   Mood:    Anxious  Depressed  Sad   Affect:    Flat   Thought Content:  Clear   Thought Form:  Coherent  Logical   Insight:    Good     Diagnoses:  1. Severe single current episode of major depressive disorder, without psychotic features (H)    2. Adjustment disorder with anxious mood        Collateral Reports Completed:  Not Applicable    Plan: (Homework, other):  Patient was given information about behavioral services and encouraged to schedule a follow up appointment with the clinic [facility] in 1 week.  He was also given information about mental health symptoms and treatment options  and Cognitive Behavioral Therapy skills to practice when experiencing anxiety and depression.  CD Recommendations: No indications of CD issues.  MICHELLE Mott, [facility]        ______________________________________________________________________    Integrated  Primary Care Behavioral Health Treatment Plan    Patient's Name: Dustin Mccoy  YOB: 1975    Date: May 15, 2018    DSM-V Diagnoses: 296.23 (F32.2) Major Depressive Disorder, Single Episode, Severe With anxious distress or Adjustment Disorders  309.24 (F43.22) With anxiety  Psychosocial / Contextual Factors: marital distress  WHODAS: 18    Referral / Collaboration:  Referral to another professional/service is not indicated at this time..    Anticipated number of session or this episode of care: 10      MeasurableTreatment Goal(s) related to diagnosis / functional impairment(s)  Goal 1: Patient will effectively reduce depressive symptoms as evidenced by a reduced PHQ9 score of 5 or less with occurrence of several days or less.        Objective #A (Patient Action)    Patient will Decrease frequency and intensity of feeling down, depressed, hopeless  Identify negative self-talk and behaviors: challenge core beliefs, myths, and actions  Improve concentration, focus, and mindfulness in daily activities   Status: New - Date: 5/15/18     Intervention(s)  [facility] will teach patient about cognitive distortions and ways to appropriately challenge and restructure statements. Encourage patient to use coping statements and positive affirmations.    Objective #B  Patient will use at least 3 coping skills for anxiety management in the next 6 weeks  Improve quantity and quality of night time sleep / decrease daytime naps  Feel less tired and more energy during the day   Status: New - Date: 5/15/18     Intervention(s)  [facility] will teach patient strategies to manage anxiety, such as relaxation and mindfulness. [facility] will also provide psycho-education on sleep hygiene practices..    Objective #C  Patient will process changes in relationships and set appropriate boundaries.  Status: New- 5/15/18    Intervention(s)  [facility] will help patient process relationship issues. Teach patient ways to set healthy relationship boundaries.  Teach and encourage use of assertive skills.    Patient has reviewed and agreed to the above plan.    Written by  MICHELLE Mott, [facility]

## 2018-05-22 NOTE — MR AVS SNAPSHOT
"              After Visit Summary   2018    Dustin Mccoy    MRN: [id number]           Patient Information     Date Of Birth          1975        Visit Information        Provider Department      2018 8:00 AM Janeen Harry LMFT [facility]        Today's Diagnoses     Severe single current episode of major depressive disorder, without psychotic features (H)    -  1    Adjustment disorder with anxious mood           Follow-ups after your visit        Who to contact     If you have questions or need follow up information about today's clinic visit or your schedule please contact [facility] directly at [phone].  Normal or non-critical lab and imaging results will be communicated to you by NovaSomhart, letter or phone within 4 business days after the clinic has received the results. If you do not hear from us within 7 days, please contact the clinic through NovaSomhart or phone. If you have a critical or abnormal lab result, we will notify you by phone as soon as possible.  Submit refill requests through Inform Direct or call your pharmacy and they will forward the refill request to us. Please allow 3 business days for your refill to be completed.          Additional Information About Your Visit        MyChart Information     Inform Direct lets you send messages to your doctor, view your test results, renew your prescriptions, schedule appointments and more. To sign up, go to [email or web address] . Click on \"Log in\" on the left side of the screen, which will take you to the Welcome page. Then click on \"Sign up Now\" on the right side of the page.     You will be asked to enter the access code listed below, as well as some personal information. Please follow the directions to create your username and password.     Your access code is: [email or web address]  Expires: 2018 10:16 AM     Your access code will  in 90 days. If you need help or a new code, please call your [place] " clinic or [phone].        Care EveryWhere ID     This is your Care EveryWhere ID. This could be used by other organizations to access your [place] medical records  [id number]         Blood Pressure from Last 3 Encounters:   04/26/18 128/80   11/16/17 118/74   10/10/17 124/80    Weight from Last 3 Encounters:   04/26/18 102.5 kg (225 lb 14.4 oz)   11/29/17 108 kg (238 lb)   11/16/17 108.1 kg (238 lb 6.4 oz)              Today, you had the following     No orders found for display       Primary Care Provider Fax #    Physician No Ref-Primary [phone]       No address on file        Equal Access to Services     LOKESH HUSAIN : Eugene Cole, drake parker, qacarley kaalmamelina hopkins, jose antonio roberson . So [facility] [phone].    ATENCIÓN: Si habla español, tiene a larry disposición servicios gratuitos de asistencia lingüística. Llame al [phone].    We comply with applicable federal civil rights laws and [place] laws. We do not discriminate on the basis of race, color, national origin, age, disability, sex, sexual orientation, or gender identity.            Thank you!     Thank you for choosing [facility]  for your care. Our goal is always to provide you with excellent care. Hearing back from our patients is one way we can continue to improve our services. Please take a few minutes to complete the written survey that you may receive in the mail after your visit with us. Thank you!             Your Updated Medication List - Protect others around you: Learn how to safely use, store and throw away your medicines at www.disposemymeds.org.          This list is accurate as of 5/22/18  9:27 AM.  Always use your most recent med list.                   Brand Name Dispense Instructions for use Diagnosis    acetaminophen 325 MG tablet    TYLENOL    100 tablet    Take 2 tablets (650 mg) by mouth every 4 hours as needed for mild pain    Olecranon bursitis of left  elbow       colestipol 1 g tablet    COLESTID     Take 1 mg by mouth 1-2 daily        IBUPROFEN PO      As needed        * naproxen 500 MG tablet    NAPROSYN    30 tablet    Take 1 tablet (500 mg) by mouth 2 times daily as needed for moderate pain    Hand injury, right, initial encounter       * naproxen 500 MG tablet    NAPROSYN    30 tablet    Take 1 tablet (500 mg) by mouth 2 times daily as needed for moderate pain    Injury of right hand, subsequent encounter       zolpidem 12.5 MG CR tablet    AMBIEN CR    30 tablet    Take 1 tablet (12.5 mg) by mouth nightly as needed for sleep    Insomnia, unspecified type       * Notice:  This list has 2 medication(s) that are the same as other medications prescribed for you. Read the directions carefully, and ask your doctor or other care provider to review them with you.

## 2018-05-31 ENCOUNTER — OFFICE VISIT (OUTPATIENT)
Dept: FAMILY MEDICINE | Facility: OTHER | Age: 43
End: 2018-05-31
Payer: COMMERCIAL

## 2018-05-31 VITALS
WEIGHT: 221.7 LBS | OXYGEN SATURATION: 98 % | HEART RATE: 84 BPM | SYSTOLIC BLOOD PRESSURE: 124 MMHG | BODY MASS INDEX: 31.04 KG/M2 | DIASTOLIC BLOOD PRESSURE: 80 MMHG | RESPIRATION RATE: 16 BRPM | TEMPERATURE: 97.5 F

## 2018-05-31 DIAGNOSIS — F32.2 SEVERE SINGLE CURRENT EPISODE OF MAJOR DEPRESSIVE DISORDER, WITHOUT PSYCHOTIC FEATURES (H): ICD-10-CM

## 2018-05-31 DIAGNOSIS — L98.9 SKIN LESION: Primary | ICD-10-CM

## 2018-05-31 PROCEDURE — 99213 OFFICE O/P EST LOW 20 MIN: CPT | Performed by: PHYSICIAN ASSISTANT

## 2018-05-31 ASSESSMENT — PAIN SCALES - GENERAL: PAINLEVEL: NO PAIN (0)

## 2018-05-31 NOTE — PATIENT INSTRUCTIONS
Understanding Human Papillomavirus (HPV)  Human papillomavirus (HPV) is a virus that causes warts. It can be hard to detect, so many people never even know they have it. Some strains (types) of HPV may cause warts on the hands, legs, or other parts of the body. These can spread from person to person. Other strains of HPV cause warts in the genital area. Of these, a few strains can lead to cancer in the area where the uterus and vagina meet (the cervix) and the genitals, as well as some other places. Treating genital forms of HPV now can help prevent serious health problems in the future.  How was I infected?  HPV is passed from person to person through contact with infected skin. Everyone with HPV has a different experience. The infection is more likely when warts are present, but can occur when no signs or symptoms are present. It is recommended that protection be used. Some people notice genital warts (condyloma) within a few months of exposure. In other people, warts take years to appear or may never appear. This makes it almost impossible to know when or by whom you were infected.    How warts form  HPV lives inside skin and mucous membrane (including in the mouth and vagina). The virus can make skin cells reproduce more often than they should. These extra skin cells build up into warts.  1. HPV invades the skin.  2. DNA from the virus enters skin cells.  3. HPV causes infected skin cells to multiply and form warts.  4. The virus sheds, allowing it to be passed to others.  Date Last Reviewed: 1/1/2017 2000-2017 [employer]. [address] [place] [address], [place] [address]. All rights reserved. This information is not intended as a substitute for professional medical care. Always follow your healthcare professional's instructions.

## 2018-05-31 NOTE — MR AVS SNAPSHOT
After Visit Summary   5/31/2018    Dustin Mccoy    MRN: [id number]           Patient Information     Date Of Birth          1975        Visit Information        Provider Department      5/31/2018 8:40 AM Marcelino Patrick PA-C [facility]        Today's Diagnoses     Skin lesion    -  1    Severe single current episode of major depressive disorder, without psychotic features (H)          Care Instructions      Understanding Human Papillomavirus (HPV)  Human papillomavirus (HPV) is a virus that causes warts. It can be hard to detect, so many people never even know they have it. Some strains (types) of HPV may cause warts on the hands, legs, or other parts of the body. These can spread from person to person. Other strains of HPV cause warts in the genital area. Of these, a few strains can lead to cancer in the area where the uterus and vagina meet (the cervix) and the genitals, as well as some other places. Treating genital forms of HPV now can help prevent serious health problems in the future.  How was I infected?  HPV is passed from person to person through contact with infected skin. Everyone with HPV has a different experience. The infection is more likely when warts are present, but can occur when no signs or symptoms are present. It is recommended that protection be used. Some people notice genital warts (condyloma) within a few months of exposure. In other people, warts take years to appear or may never appear. This makes it almost impossible to know when or by whom you were infected.    How warts form  HPV lives inside skin and mucous membrane (including in the mouth and vagina). The virus can make skin cells reproduce more often than they should. These extra skin cells build up into warts.  1. HPV invades the skin.  2. DNA from the virus enters skin cells.  3. HPV causes infected skin cells to multiply and form warts.  4. The virus sheds, allowing it to be passed to  "others.  Date Last Reviewed: 2017-2017 [employer] [address] [place] [address], [place] [address]. All rights reserved. This information is not intended as a substitute for professional medical care. Always follow your healthcare professional's instructions.                Follow-ups after your visit        Who to contact     If you have questions or need follow up information about today's clinic visit or your schedule please contact [facility] directly at [phone].  Normal or non-critical lab and imaging results will be communicated to you by Damballahart, letter or phone within 4 business days after the clinic has received the results. If you do not hear from us within 7 days, please contact the clinic through Damballahart or phone. If you have a critical or abnormal lab result, we will notify you by phone as soon as possible.  Submit refill requests through VOYAA or call your pharmacy and they will forward the refill request to us. Please allow 3 business days for your refill to be completed.          Additional Information About Your Visit        [facility] Information     VOYAA lets you send messages to your doctor, view your test results, renew your prescriptions, schedule appointments and more. To sign up, go to [email or web address] . Click on \"Log in\" on the left side of the screen, which will take you to the Welcome page. Then click on \"Sign up Now\" on the right side of the page.     You will be asked to enter the access code listed below, as well as some personal information. Please follow the directions to create your username and password.     Your access code is: [email or web address]  Expires: 2018  8:34 AM     Your access code will  in 90 days. If you need help or a new code, please call your [facility] or [phone].        Care EveryWhere ID     This is your Care EveryWhere ID. This could be used by other organizations to access your [place] medical " records  [id number]        Your Vitals Were     Pulse Temperature Respirations Pulse Oximetry BMI (Body Mass Index)       84 97.5  F (36.4  C) (Oral) 16 98% 31.04 kg/m2        Blood Pressure from Last 3 Encounters:   05/31/18 124/80   04/26/18 128/80   11/16/17 118/74    Weight from Last 3 Encounters:   05/31/18 221 lb 11.2 oz (100.6 kg)   04/26/18 225 lb 14.4 oz (102.5 kg)   11/29/17 238 lb (108 kg)              We Performed the Following     DEPRESSION ACTION PLAN (DAP)        Primary Care Provider Fax #    Physician No Ref-Primary [phone]       No address on file        Equal Access to Services     LOKESH HUSAIN : Eugene Cole, drake parker, bart hopkins, jose antonio velasquez. So [facility] [phone].    ATENCIÓN: Si habla español, tiene a larry disposición servicios gratuitos de asistencia lingüística. Llame al [phone].    We comply with applicable federal civil rights laws and [place] laws. We do not discriminate on the basis of race, color, national origin, age, disability, sex, sexual orientation, or gender identity.            Thank you!     Thank you for choosing [facility]  for your care. Our goal is always to provide you with excellent care. Hearing back from our patients is one way we can continue to improve our services. Please take a few minutes to complete the written survey that you may receive in the mail after your visit with us. Thank you!             Your Updated Medication List - Protect others around you: Learn how to safely use, store and throw away your medicines at www.disposemymeds.org.          This list is accurate as of 5/31/18  9:14 AM.  Always use your most recent med list.                   Brand Name Dispense Instructions for use Diagnosis    acetaminophen 325 MG tablet    TYLENOL    100 tablet    Take 2 tablets (650 mg) by mouth every 4 hours as needed for mild pain    Olecranon bursitis of left elbow        colestipol 1 g tablet    COLESTID     Take 1 mg by mouth 1-2 daily        IBUPROFEN PO      As needed        * naproxen 500 MG tablet    NAPROSYN    30 tablet    Take 1 tablet (500 mg) by mouth 2 times daily as needed for moderate pain    Hand injury, right, initial encounter       * naproxen 500 MG tablet    NAPROSYN    30 tablet    Take 1 tablet (500 mg) by mouth 2 times daily as needed for moderate pain    Injury of right hand, subsequent encounter       zolpidem 12.5 MG CR tablet    AMBIEN CR    30 tablet    Take 1 tablet (12.5 mg) by mouth nightly as needed for sleep    Insomnia, unspecified type       * Notice:  This list has 2 medication(s) that are the same as other medications prescribed for you. Read the directions carefully, and ask your doctor or other care provider to review them with you.

## 2018-05-31 NOTE — NURSING NOTE
Health Maintenance Due   Topic Date Due     DEPRESSION ACTION PLAN Q1 YR  05/12/1991     HIV SCREEN (SYSTEM ASSIGNED)  05/12/1991     LIPID SCREEN Q5 YR MALE (SYSTEM ASSIGNED)  05/12/2008     Julia LOVE LPN

## 2018-05-31 NOTE — LETTER
My Depression Action Plan  Name: Dustin Mccoy   Date of Birth 1975  Date: 5/31/2018    My doctor: No Ref-Primary, Physician   My clinic: [place] [address] [facility] [address] [phone]          GREEN    ZONE   Good Control    What it looks like:     Things are going generally well. You have normal up s and down s. You may even feel depressed from time to time, but bad moods usually last less than a day.   What you need to do:  1. Continue to care for yourself (see self care plan)  2. Check your depression survival kit and update it as needed  3. Follow your physician s recommendations including any medication.  4. Do not stop taking medication unless you consult with your physician first.           YELLOW         ZONE Getting Worse    What it looks like:     Depression is starting to interfere with your life.     It may be hard to get out of bed; you may be starting to isolate yourself from others.    Symptoms of depression are starting to last most all day and this has happened for several days.     You may have suicidal thoughts but they are not constant.   What you need to do:     1. Call your care team, your response to treatment will improve if you keep your care team informed of your progress. Yellow periods are signs an adjustment may need to be made.     2. Continue your self-care, even if you have to fake it!    3. Talk to someone in your support network    4. Open up your depression survival kit           RED    ZONE Medical Alert - Get Help    What it looks like:     Depression is seriously interfering with your life.     You may experience these or other symptoms: You can t get out of bed most days, can t work or engage in other necessary activities, you have trouble taking care of basic hygiene, or basic responsibilities, thoughts of suicide or death that will not go away, self-injurious behavior.     What you need to do:  1. Call your care team and  request a same-day appointment. If they are not available (weekends or after hours) call your local crisis line, emergency room or 911.            Depression Self Care Plan / Survival Kit    Self-Care for Depression  Here s the deal. Your body and mind are really not as separate as most people think.  What you do and think affects how you feel and how you feel influences what you do and think. This means if you do things that people who feel good do, it will help you feel better.  Sometimes this is all it takes.  There is also a place for medication and therapy depending on how severe your depression is, so be sure to consult with your medical provider and/ or Behavioral Health Consultant if your symptoms are worsening or not improving.     In order to better manage my stress, I will:    Exercise  Get some form of exercise, every day. This will help reduce pain and release endorphins, the  feel good  chemicals in your brain. This is almost as good as taking antidepressants!  This is not the same as joining a gym and then never going! (they count on that by the way ) It can be as simple as just going for a walk or doing some gardening, anything that will get you moving.      Hygiene   Maintain good hygiene (Get out of bed in the morning, Make your bed, Brush your teeth, Take a shower, and Get dressed like you were going to work, even if you are unemployed).  If your clothes don't fit try to get ones that do.    Diet  I will strive to eat foods that are good for me, drink plenty of water, and avoid excessive sugar, caffeine, alcohol, and other mood-altering substances.  Some foods that are helpful in depression are: complex carbohydrates, B vitamins, flaxseed, fish or fish oil, fresh fruits and vegetables.    Psychotherapy  I agree to participate in Individual Therapy (if recommended).    Medication  If prescribed medications, I agree to take them.  Missing doses can result in serious side effects.  I understand that  drinking alcohol, or other illicit drug use, may cause potential side effects.  I will not stop my medication abruptly without first discussing it with my provider.    Staying Connected With Others  I will stay in touch with my friends, family members, and my primary care provider/team.    Use your imagination  Be creative.  We all have a creative side; it doesn t matter if it s oil painting, sand castles, or mud pies! This will also kick up the endorphins.    Witness Beauty  (AKA stop and smell the roses) Take a look outside, even in mid-winter. Notice colors, textures. Watch the squirrels and birds.     Service to others  Be of service to others.  There is always someone else in need.  By helping others we can  get out of ourselves  and remember the really important things.  This also provides opportunities for practicing all the other parts of the program.    Humor  Laugh and be silly!  Adjust your TV habits for less news and crime-drama and more comedy.    Control your stress  Try breathing deep, massage therapy, biofeedback, and meditation. Find time to relax each day.     My support system    Clinic Contact:  Phone number:    Contact 1:  Phone number:    Contact 2:  Phone number:    Church/:  Phone number:    Therapist:  Phone number:    Local crisis center:    Phone number:    Other community support:  Phone number:

## 2018-05-31 NOTE — PROGRESS NOTES
SUBJECTIVE:   Dustin Mccoy is a 43 year old male who presents to clinic today for the following health issues:      Rash  Onset: 2 dasy    Description:   Location: groin area  Character: blotchy, painful, burning, red  Itching (Pruritis): no     Progression of Symptoms:  improving    Accompanying Signs & Symptoms:  Fever: no   Body aches or joint pain: no   Sore throat symptoms: no   Recent cold symptoms: no     History:   Previous similar rash: no     Precipitating factors:   Exposure to similar rash: no   New exposures: None   Recent travel: no     Alleviating factors:  nothing    Therapies Tried and outcome: nothing    Patient is a pleasant 43 year old male who is in today due to concerns about an irritated area on the glans of the penis. He said that Tuesday 05/29 he was fishing with a peer when they decided to swim [place] the lake, estimated 1/2 mile. He chose to backstroke majority of the swim, had no difficulty and showered fairly soon after getting out. The following morning he noticed an irritated area on the tip of the penis. He denies fever, discharge, dysuria, new lesions or new sexual partners. He went online and looked at possible STDs which exacerbated his anxiety. No new sexual partners, one woman whom he has been with prior to. Uses protection majority of the time. Concerned he may have been exposed to HPV at some time in the past.         Problem list and histories reviewed & adjusted, as indicated.  Additional history: as documented    Patient Active Problem List   Diagnosis     H/O irritable bowel syndrome     Atypical chest pain     Olecranon bursitis of left elbow     Cellulitis of left upper extremity     Cellulitis of left elbow     Contusion of right hand, subsequent encounter     Severe single current episode of major depressive disorder, without psychotic features (H)     Adjustment disorder with anxious mood     Past Surgical History:   Procedure Laterality Date     NO HISTORY OF  SURGERY         Social History   Substance Use Topics     Smoking status: Never Smoker     Smokeless tobacco: Never Used     Alcohol use Yes      Comment: occasional     Family History   Problem Relation Age of Onset     DIABETES Father          Current Outpatient Prescriptions   Medication Sig Dispense Refill     acetaminophen (TYLENOL) 325 MG tablet Take 2 tablets (650 mg) by mouth every 4 hours as needed for mild pain (Patient not taking: Reported on 11/16/2017) 100 tablet      colestipol (COLESTID) 1 G tablet Take 1 mg by mouth 1-2 daily       IBUPROFEN PO As needed       naproxen (NAPROSYN) 500 MG tablet Take 1 tablet (500 mg) by mouth 2 times daily as needed for moderate pain (Patient not taking: Reported on 4/26/2018) 30 tablet 1     naproxen (NAPROSYN) 500 MG tablet Take 1 tablet (500 mg) by mouth 2 times daily as needed for moderate pain (Patient not taking: Reported on 11/16/2017) 30 tablet 1     zolpidem (AMBIEN CR) 12.5 MG CR tablet Take 1 tablet (12.5 mg) by mouth nightly as needed for sleep (Patient not taking: Reported on 5/31/2018) 30 tablet 1     Allergies   Allergen Reactions     Bactrim [Sulfamethoxazole W/Trimethoprim]      Erythromycin      Sulfa Drugs        Reviewed and updated as needed this visit by clinical staff  Tobacco  Allergies  Meds  Med Hx  Surg Hx  Fam Hx  Soc Hx      Reviewed and updated as needed this visit by Provider         ROS:  Constitutional, HEENT, cardiovascular, pulmonary, gi and gu systems are negative, except as otherwise noted.    OBJECTIVE:     /80 (BP Location: Right arm, Patient Position: Chair, Cuff Size: Adult Large)  Pulse 84  Temp 97.5  F (36.4  C) (Oral)  Resp 16  Wt 221 lb 11.2 oz (100.6 kg)  SpO2 98%  BMI 31.04 kg/m2  Body mass index is 31.04 kg/(m^2).  GENERAL: healthy, alert and no distress   (male): normal male genitalia without urethral discharge, no hernia. Small 1mm ulcerated, healing skin in the pattern of swim suit mesh present on  the dorsal surface of glans.    Diagnostic Test Results:  none     ASSESSMENT/PLAN:       1. Skin lesion  Benign lesion looks to be caused by friction from swim suit mesh. Encouraged patient to use triple antibiotic ointment or similar product and monitor for signs of infection. If discharge, fever, etc. Develop patient can come in for lab only appointment to be checked for STD.     2. Severe single current episode of major depressive disorder, without psychotic features (H)  Patient seeing  counselor, reports improvement in his symptoms. Denies thoughts of hurting self, have social outlets. No changes will be made at this time.   - DEPRESSION ACTION PLAN (DAP)    Follow up with clinic as needed or sooner if conditions change, worsen or fail to improve as expected.      Marcelino Patrick PA-C  [facility]

## 2018-06-01 ENCOUNTER — OFFICE VISIT (OUTPATIENT)
Dept: BEHAVIORAL HEALTH | Facility: CLINIC | Age: 43
End: 2018-06-01
Payer: COMMERCIAL

## 2018-06-01 DIAGNOSIS — F43.22 ADJUSTMENT DISORDER WITH ANXIOUS MOOD: ICD-10-CM

## 2018-06-01 DIAGNOSIS — F32.2 SEVERE SINGLE CURRENT EPISODE OF MAJOR DEPRESSIVE DISORDER, WITHOUT PSYCHOTIC FEATURES (H): Primary | ICD-10-CM

## 2018-06-01 PROCEDURE — 90832 PSYTX W PT 30 MINUTES: CPT | Performed by: MARRIAGE & FAMILY THERAPIST

## 2018-06-01 NOTE — PROGRESS NOTES
[facility]  June 1, 2018      Behavioral Health Clinician Progress Note    Patient Name: Dustin Mccoy           Service Type:  Individual      Service Location:   Face to Face in Clinic     Session Start Time: 10:35  Session End Time: 11:03      Session Length: 16 - 37      Attendees: Patient    Visit Activities (Refresh list every visit): [facility] Only    Diagnostic Assessment Date: 5/4/18  Treatment Plan Review Date: 5/15/2018  See Flowsheets for today's PHQ-9 and MAIK-7 results  Previous PHQ-9:   PHQ-9 SCORE 4/26/2018   Total Score 20     Previous MAIK-7:   MAIK-7 SCORE 5/4/2018   Total Score 11       KAMINI LEVEL:  KAMINI Score (Last Two) 11/19/2014   KAMINI Raw Score 47   Activation Score 77.5   KAMINI Level 4       DATA  Extended Session (60+ minutes): No  Interactive Complexity: No  Crisis: No  [facility] Patient: No    Treatment Objective(s) Addressed in This Session:  Target Behavior(s): depression, anxiety and relationship issues    Depressed Mood: Increase interest, engagement, and pleasure in doing things  Decrease frequency and intensity of feeling down, depressed, hopeless  Improve quantity and quality of night time sleep / decrease daytime naps  Feel less tired and more energy during the day   Improve diet, appetite, mindful eating, and / or meal planning  Identify negative self-talk and behaviors: challenge core beliefs, myths, and actions  Improve concentration, focus, and mindfulness in daily activities   Feel less fidgety, restless or slow in daily activities / interpersonal interactions  Anxiety: will experience a reduction in anxiety, will develop more effective coping skills to manage anxiety symptoms, will develop healthy cognitive patterns and beliefs and will increase ability to function adaptively  Adjustment Difficulties: will develop coping/problem-solving skills to facilitate more adaptive adjustment  Relationship Problems: will address relationship difficulties in a more  adaptive manner    Current Stressors / Issues:  Patient returned to work on 5/30. He heard feedback from a colleague that he looked mad. He states that this surprised him as he thought he had been doing better and didn't think things would be so apparent. He states that he also found out, yesterday, that the woman he was seeing is now engaged. He struggled with sleep after finding this out and ended up staying home from work the following day as a result. Patient feels unable to work more than 8 hours at a time.    Processed recent events. Validated and normalized patient's feelings. Discussed sleep hygiene practices and encouraged use of skills. As well as incorporation of deep breathing and mindfulness.      Progress on Treatment Objective(s) / Homework:  Minimal progress - PREPARATION (Decided to change - considering how); Intervened by negotiating a change plan and determining options / strategies for behavior change, identifying triggers, exploring social supports, and working towards setting a date to begin behavior change    Motivational Interviewing    MI Intervention: Expressed Empathy/Understanding, Supported Autonomy, Collaboration, Evocation, Permission to raise concern or advise, Open-ended questions, Reflections: simple and complex, Change talk (evoked) and Reframe     Change Talk Expressed by the Patient: Desire to change Ability to change Reasons to change Need to change Committment to change Activation Taking steps    Provider Response to Change Talk: E - Evoked more info from patient about behavior change, A - Affirmed patient's thoughts, decisions, or attempts at behavior change, R - Reflected patient's change talk and S - Summarized patient's change talk statements    Also provided psychoeducation about behavioral health condition, symptoms, and treatment options    Skills training    Explored skills useful to client in current situation    Skills include assertiveness, communication, conflict  management, problem-solving, relaxation, etc.    Solution-Focused Therapy    Explored patterns in patient's relationships and discussed options for new behaviors    Explored patterns in patient's actions and choices and discussed options for new behaviors    Cognitive-behavioral Therapy    Discussed common cognitive distortions, identified them in patient's life    Explored ways to challenge, replace, and act against these cognitions    Acceptance and Commitment Therapy    Explored and identified important values in patient's life    Discussed ways to commit to behavioral activation around these values    Psychodynamic psychotherapy    Discussed patient's emotional dynamics and issues and how they impact behaviors    Explored patient's history of relationships and how they impact present behaviors    Explored how to work with and make changes in these schemas and patterns    Behavioral Activation    Discussed steps patient can take to become more involved in meaningful activity    Identified barriers to these activities and explored possible solutions    Mindfulness-Based Strategies    Discussed skills based on development and application of mindfulness    Skills drawn from dialectical behavior therapy, mindfulness-based stress reduction, mindfulness-based cognitive therapy, etc.      Care Plan review completed: Yes    Medication Review:  No current psychiatric medications prescribed Patient will consider use of Ambien. Discussed consideration of medication; patient continues to want to go without at this time.    Medication Compliance:  NA    Changes in Health Issues:   None reported    Chemical Use Review:   Substance Use: Chemical use reviewed, no active concerns identified      Tobacco Use: No current tobacco use.      Assessment: Current Emotional / Mental Status (status of significant symptoms):  Risk status (Self / Other harm or suicidal ideation)  Patient denies a history of suicidal ideation, suicide  attempts, self-injurious behavior, homicidal ideation, homicidal behavior and and other safety concerns  Patient denies current fears or concerns for personal safety.  Patient denies current or recent suicidal ideation or behaviors.  Patient denies current or recent homicidal ideation or behaviors.  Patient denies current or recent self injurious behavior or ideation.  Patient denies other safety concerns.  A safety and risk management plan has not been developed at this time, however patient was encouraged to call [place] [id number] should there be a change in any of these risk factors.    Appearance:   Appropriate   Eye Contact:   Poor  Psychomotor Behavior: Restless   Attitude:   Cooperative   Orientation:   All  Speech   Rate / Production: Monotone    Volume:  Normal   Mood:    Anxious  Depressed  Sad   Affect:    Flat   Thought Content:  Clear   Thought Form:  Coherent  Logical   Insight:    Good     Diagnoses:  1. Severe single current episode of major depressive disorder, without psychotic features (H)    2. Adjustment disorder with anxious mood        Collateral Reports Completed:  Not Applicable    Plan: (Homework, other):  Patient was given information about behavioral services and encouraged to schedule a follow up appointment with the clinic [facility] in 2 weeks, patient can schedule sooner if needed.  He was also given information about mental health symptoms and treatment options , Cognitive Behavioral Therapy skills to practice when experiencing anxiety and depression, deep Breathing Strategies to practice when experiencing anxiety and depression and sleep Hygeine recommendations, including a handout with tips and strategies.  CD Recommendations: No indications of CD issues.  MICHELLE Mott, [facility]        ______________________________________________________________________    Integrated Primary Care Behavioral Health Treatment Plan    Patient's Name: Dustin Mccoy  YOB: 1975    Date:  May 15, 2018    DSM-V Diagnoses: 296.23 (F32.2) Major Depressive Disorder, Single Episode, Severe With anxious distress or Adjustment Disorders  309.24 (F43.22) With anxiety  Psychosocial / Contextual Factors: marital distress  WHODAS: 18    Referral / Collaboration:  Referral to another professional/service is not indicated at this time..    Anticipated number of session or this episode of care: 10      MeasurableTreatment Goal(s) related to diagnosis / functional impairment(s)  Goal 1: Patient will effectively reduce depressive symptoms as evidenced by a reduced PHQ9 score of 5 or less with occurrence of several days or less.        Objective #A (Patient Action)    Patient will Decrease frequency and intensity of feeling down, depressed, hopeless  Identify negative self-talk and behaviors: challenge core beliefs, myths, and actions  Improve concentration, focus, and mindfulness in daily activities   Status: New - Date: 5/15/18     Intervention(s)  [facility] will teach patient about cognitive distortions and ways to appropriately challenge and restructure statements. Encourage patient to use coping statements and positive affirmations.    Objective #B  Patient will use at least 3 coping skills for anxiety management in the next 6 weeks  Improve quantity and quality of night time sleep / decrease daytime naps  Feel less tired and more energy during the day   Status: New - Date: 5/15/18     Intervention(s)  [facility] will teach patient strategies to manage anxiety, such as relaxation and mindfulness. [facility] will also provide psycho-education on sleep hygiene practices..    Objective #C  Patient will process changes in relationships and set appropriate boundaries.  Status: New- 5/15/18    Intervention(s)  [facility] will help patient process relationship issues. Teach patient ways to set healthy relationship boundaries. Teach and encourage use of assertive skills.    Patient has reviewed and agreed to the above plan.    Written by  Janeen  MICHELLE Harry, [facility]

## 2018-06-04 ENCOUNTER — TELEPHONE (OUTPATIENT)
Dept: FAMILY MEDICINE | Facility: OTHER | Age: 43
End: 2018-06-04

## 2018-06-04 DIAGNOSIS — Z11.3 SCREENING EXAMINATION FOR VENEREAL DISEASE: Primary | ICD-10-CM

## 2018-06-04 NOTE — TELEPHONE ENCOUNTER
Reason for Call: Request for an order or referral:    Order or referral being requested: Alfredo calls stating he would like to have the lab work ordered that he discussed with Marcelino Patrick at his [facility] appt with him.    Date needed: as soon as possible    Has the patient been seen by the PCP for this problem? YES    Additional comments: Alfredo states he was told if he has any concerns that Marcelino would order lab work for him.    Phone number Patient can be reached at:  Cell number on file:    Telephone Information:   Mobile [phone]       Best Time:  any    Can we leave a detailed message on this number?  YES    Call taken on 6/4/2018 at 8:43 AM by Mary Patrick

## 2018-06-05 NOTE — TELEPHONE ENCOUNTER
Please inform patient that I have placed future lab orders for him to come to have completed at a time that is convenient for him.     Marcelino Patrick PA-C on 6/5/2018 at 9:12 AM

## 2018-06-05 NOTE — TELEPHONE ENCOUNTER
I left a call back message.      I called to give him the message from Marcelino Patrick PA-C:  Please inform patient that I have placed future lab orders for him to come to have completed at a time that is convenient for him.

## 2018-06-06 DIAGNOSIS — Z11.3 SCREENING EXAMINATION FOR VENEREAL DISEASE: ICD-10-CM

## 2018-06-06 DIAGNOSIS — B00.9 HERPES SIMPLEX VIRUS INFECTION: Primary | ICD-10-CM

## 2018-06-06 PROCEDURE — 86780 TREPONEMA PALLIDUM: CPT | Performed by: PHYSICIAN ASSISTANT

## 2018-06-06 PROCEDURE — 87389 HIV-1 AG W/HIV-1&-2 AB AG IA: CPT | Performed by: PHYSICIAN ASSISTANT

## 2018-06-06 PROCEDURE — 87591 N.GONORRHOEAE DNA AMP PROB: CPT | Performed by: PHYSICIAN ASSISTANT

## 2018-06-06 PROCEDURE — 36415 COLL VENOUS BLD VENIPUNCTURE: CPT | Performed by: PHYSICIAN ASSISTANT

## 2018-06-06 PROCEDURE — 86696 HERPES SIMPLEX TYPE 2 TEST: CPT | Performed by: PHYSICIAN ASSISTANT

## 2018-06-06 PROCEDURE — 87491 CHLMYD TRACH DNA AMP PROBE: CPT | Performed by: PHYSICIAN ASSISTANT

## 2018-06-06 PROCEDURE — 86695 HERPES SIMPLEX TYPE 1 TEST: CPT | Performed by: PHYSICIAN ASSISTANT

## 2018-06-07 ENCOUNTER — TELEPHONE (OUTPATIENT)
Dept: FAMILY MEDICINE | Facility: CLINIC | Age: 43
End: 2018-06-07

## 2018-06-07 LAB
C TRACH DNA SPEC QL NAA+PROBE: NEGATIVE
HIV 1+2 AB+HIV1 P24 AG SERPL QL IA: NONREACTIVE
HSV1 IGG SERPL QL IA: >8 AI (ref 0–0.8)
HSV2 IGG SERPL QL IA: <0.2 AI (ref 0–0.8)
N GONORRHOEA DNA SPEC QL NAA+PROBE: NEGATIVE
SPECIMEN SOURCE: NORMAL
SPECIMEN SOURCE: NORMAL
T PALLIDUM AB SER QL: NONREACTIVE

## 2018-06-07 NOTE — TELEPHONE ENCOUNTER
Phone Encounter   [facility] for patient stating that the letter is at the  for him to  at his convenience.

## 2018-06-07 NOTE — LETTER
[address] [address]  Phone: [phone]  Fax: [phone]    June 7, 2018        Dustin Mccoy  [address] [facility] [address]          To whom it may concern:    RE: Dustin Mccoy    When the patient returns to work, the following restrictions apply until further notice:    Patient can work no longer than an 8 hour shift.    Please contact me for questions or concerns.      Sincerely,        Janeen Harry MA, [facility]  Behavioral Health Clinician

## 2018-06-07 NOTE — TELEPHONE ENCOUNTER
He needs a letter stating he cannot work more then 8 hours a day.  Please call him when ready and he can pick it up.    Thank you,  Ginny LOVE

## 2018-06-08 ENCOUNTER — TELEPHONE (OUTPATIENT)
Dept: FAMILY MEDICINE | Facility: OTHER | Age: 43
End: 2018-06-08

## 2018-06-08 RX ORDER — VALACYCLOVIR HYDROCHLORIDE 1 G/1
1000 TABLET, FILM COATED ORAL 2 TIMES DAILY
Qty: 20 TABLET | Refills: 0 | Status: SHIPPED | OUTPATIENT
Start: 2018-06-08 | End: 2018-09-28

## 2018-06-08 NOTE — TELEPHONE ENCOUNTER
"Spoke with patient. He is \"kind of freaking out\" and has a few more concerns.     1) If he tested positive for HSV 1, does he need to be concerned about getting HSV 2 in the future?   2) Is this something he will have for the rest of his life? What does he need to tell his sexual partner?   3) Is there anything else he needs to be concerned about?    "

## 2018-06-08 NOTE — TELEPHONE ENCOUNTER
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 However, it is not impossible for it to infect the genitalia. That being said I will send out a course of an anti-viral for him to take, he should monitor for symptoms such as mild fever, cold like symptoms and vesicular rash. The remaining results of his testing were normal.     The patient stated he does not have any sore currently and wanted to know if he should still take the medication.  I talked to Marcelino Patrick PA-C and he stated yes he still would like him to take the medication to suppress the virus, VORB.  The patient was informed.  "

## 2018-06-08 NOTE — TELEPHONE ENCOUNTER
Spoke with patient and gave info. He said thank you for answering his questions. He feels better about it now.

## 2018-06-08 NOTE — TELEPHONE ENCOUNTER
Pt calling. He would like a call again from Julia in regards to this. Would not go into detail further with me. Please call. [phone].  Thank you,  Bertha Hahn- Pt Rep.

## 2018-06-08 NOTE — TELEPHONE ENCOUNTER
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 However, it is not impossible for it to infect the genitalia. That being said I will send out a course of an anti-viral for him to take, he should monitor for symptoms such as mild fever, cold like symptoms and vesicular rash. The remaining results of his testing were normal.    Marcelino Patrick PA-C    "

## 2018-06-08 NOTE — TELEPHONE ENCOUNTER
To address his concerns:    1. We did test for HSV2 it returned negative.   2. Herpes is a lifetime illness. HSV1 is more commonly associated with oral outbreaks such as cold sores. Given that he has not had any outbreaks either orally or on his genitalia, I will leave it to his discretion. That being said, I do encourage use of protection with intercourse.   3. The remainder of his testing returned negative. He should complete the anti-viral as this is a standard prophylactic treatment.     Marcelino Patrick PA-C on 6/8/2018 at 1:14 PM

## 2018-06-08 NOTE — TELEPHONE ENCOUNTER
"I left a call back message.    I am calling to inform him of the following per Marcelino Patrick PA-C:  HSV test returned positive for Type 1, this is typically localized orally causing \"canker sores\". However, it is not impossible for it to infect the genitalia. That being said I will send out a course of an anti-viral for him to take, he should monitor for symptoms such as mild fever, cold like symptoms and vesicular rash. The remaining results of his testing were normal.     Marcelino WRIGHTC   "

## 2018-06-15 ENCOUNTER — OFFICE VISIT (OUTPATIENT)
Dept: BEHAVIORAL HEALTH | Facility: CLINIC | Age: 43
End: 2018-06-15
Payer: COMMERCIAL

## 2018-06-15 DIAGNOSIS — F32.2 SEVERE SINGLE CURRENT EPISODE OF MAJOR DEPRESSIVE DISORDER, WITHOUT PSYCHOTIC FEATURES (H): Primary | ICD-10-CM

## 2018-06-15 DIAGNOSIS — F43.22 ADJUSTMENT DISORDER WITH ANXIOUS MOOD: ICD-10-CM

## 2018-06-15 PROCEDURE — 90832 PSYTX W PT 30 MINUTES: CPT | Performed by: MARRIAGE & FAMILY THERAPIST

## 2018-06-15 ASSESSMENT — PATIENT HEALTH QUESTIONNAIRE - PHQ9: 5. POOR APPETITE OR OVEREATING: MORE THAN HALF THE DAYS

## 2018-06-15 ASSESSMENT — ANXIETY QUESTIONNAIRES
1. FEELING NERVOUS, ANXIOUS, OR ON EDGE: SEVERAL DAYS
IF YOU CHECKED OFF ANY PROBLEMS ON THIS QUESTIONNAIRE, HOW DIFFICULT HAVE THESE PROBLEMS MADE IT FOR YOU TO DO YOUR WORK, TAKE CARE OF THINGS AT HOME, OR GET ALONG WITH OTHER PEOPLE: VERY DIFFICULT
6. BECOMING EASILY ANNOYED OR IRRITABLE: MORE THAN HALF THE DAYS
5. BEING SO RESTLESS THAT IT IS HARD TO SIT STILL: MORE THAN HALF THE DAYS
GAD7 TOTAL SCORE: 10
7. FEELING AFRAID AS IF SOMETHING AWFUL MIGHT HAPPEN: NOT AT ALL
3. WORRYING TOO MUCH ABOUT DIFFERENT THINGS: SEVERAL DAYS
2. NOT BEING ABLE TO STOP OR CONTROL WORRYING: MORE THAN HALF THE DAYS

## 2018-06-15 NOTE — Clinical Note
Jesus Lieberman,  I met with Alfredo today, and his mood is worsening. He finally agrees to get started on an anti-depressant. I recommended that he contact you via [employer] so that you can possibly start him on a med now and see him in a couple weeks for follow up. I did tell him that he would be informed if he needs to be seen prior to starting as well. Let me know if you have any further questions. Thanks,  Janeen

## 2018-06-15 NOTE — PROGRESS NOTES
[facility]  Elisa 15, 2018      Behavioral Health Clinician Progress Note    Patient Name: Dustin Mccoy           Service Type:  Individual      Service Location:   Face to Face in Clinic     Session Start Time: 11:30  Session End Time: 12:00      Session Length: 16 - 37      Attendees: Patient    Visit Activities (Refresh list every visit): [facility] Only    Diagnostic Assessment Date: 5/4/18  Treatment Plan Review Date: 5/15/2018  See Flowsheets for today's PHQ-9 and MAIK-7 results  Previous PHQ-9:   PHQ-9 SCORE 4/26/2018 6/15/2018   Total Score 20 17     Previous MAIK-7:   MAIK-7 SCORE 5/4/2018 6/15/2018   Total Score 11 10       KAMINI LEVEL:  KAMINI Score (Last Two) 11/19/2014   KAMINI Raw Score 47   Activation Score 77.5   KAMINI Level 4       DATA  Extended Session (60+ minutes): No  Interactive Complexity: No  Crisis: No  [facility] Patient: No    Treatment Objective(s) Addressed in This Session:  Target Behavior(s): depression, anxiety and relationship issues    Depressed Mood: Increase interest, engagement, and pleasure in doing things  Decrease frequency and intensity of feeling down, depressed, hopeless  Improve quantity and quality of night time sleep / decrease daytime naps  Feel less tired and more energy during the day   Improve diet, appetite, mindful eating, and / or meal planning  Identify negative self-talk and behaviors: challenge core beliefs, myths, and actions  Improve concentration, focus, and mindfulness in daily activities   Feel less fidgety, restless or slow in daily activities / interpersonal interactions  Anxiety: will experience a reduction in anxiety, will develop more effective coping skills to manage anxiety symptoms, will develop healthy cognitive patterns and beliefs and will increase ability to function adaptively  Adjustment Difficulties: will develop coping/problem-solving skills to facilitate more adaptive adjustment  Relationship Problems: will address relationship  difficulties in a more adaptive manner    Current Stressors / Issues:  Patient reports that he experienced somewhat of a panic attack earlier this week. He had just woken up where he was having difficulty with breathing and took both Tuesday and Wednesday off as a result. He had not been getting adequate sleep due to increased anxiety. He states that he will wake after three hours.    He states that he is feeling more anxious as well as hopeless. He states that he is feeling overwhelmed.    His wife informed him that she put money down for an .    Processed recent events. Validated and normalized patient's feelings. Discussed sleep hygiene practices and encouraged use of skills. As well as incorporation of deep breathing and mindfulness.    Encouraged boundary setting at work as well. Discussed focusing on one thing at a time and one task at a time. Reflecting on what he can attend to now and that there are certain things that he can't do anything about at this time except for focusing on himself and his health.      Progress on Treatment Objective(s) / Homework:  Worsening - ACTION (Actively working towards change); Intervened by reinforcing change plan / affirming steps taken    Motivational Interviewing    MI Intervention: Expressed Empathy/Understanding, Supported Autonomy, Collaboration, Evocation, Permission to raise concern or advise, Open-ended questions, Reflections: simple and complex, Change talk (evoked) and Reframe     Change Talk Expressed by the Patient: Desire to change Ability to change Reasons to change Need to change Committment to change Activation Taking steps    Provider Response to Change Talk: E - Evoked more info from patient about behavior change, A - Affirmed patient's thoughts, decisions, or attempts at behavior change, R - Reflected patient's change talk and S - Summarized patient's change talk statements    Also provided psychoeducation about behavioral health condition,  symptoms, and treatment options    Skills training    Explored skills useful to client in current situation    Skills include assertiveness, communication, conflict management, problem-solving, relaxation, etc.    Solution-Focused Therapy    Explored patterns in patient's relationships and discussed options for new behaviors    Explored patterns in patient's actions and choices and discussed options for new behaviors    Cognitive-behavioral Therapy    Discussed common cognitive distortions, identified them in patient's life    Explored ways to challenge, replace, and act against these cognitions    Acceptance and Commitment Therapy    Explored and identified important values in patient's life    Discussed ways to commit to behavioral activation around these values    Psychodynamic psychotherapy    Discussed patient's emotional dynamics and issues and how they impact behaviors    Explored patient's history of relationships and how they impact present behaviors    Explored how to work with and make changes in these schemas and patterns    Behavioral Activation    Discussed steps patient can take to become more involved in meaningful activity    Identified barriers to these activities and explored possible solutions    Mindfulness-Based Strategies    Discussed skills based on development and application of mindfulness    Skills drawn from dialectical behavior therapy, mindfulness-based stress reduction, mindfulness-based cognitive therapy, etc.      Care Plan review completed: Yes    Medication Review:  No current psychiatric medications prescribed He has taken the Ambien a couple times, however it is not helping him with sustained sleep. Recommended he speak with his PCP about getting started on an anti-depressant. Patient was previously reluctant, however agrees that he should do this at this point.    Medication Compliance:  NA    Changes in Health Issues:   None reported    Chemical Use Review:   Substance Use:  Chemical use reviewed, no active concerns identified      Tobacco Use: No current tobacco use.      Assessment: Current Emotional / Mental Status (status of significant symptoms):  Risk status (Self / Other harm or suicidal ideation)  Patient denies a history of suicidal ideation, suicide attempts, self-injurious behavior, homicidal ideation, homicidal behavior and and other safety concerns  Patient denies current fears or concerns for personal safety.  Patient denies current or recent suicidal ideation or behaviors.  Patient denies current or recent homicidal ideation or behaviors.  Patient denies current or recent self injurious behavior or ideation.  Patient denies other safety concerns.  A safety and risk management plan has not been developed at this time, however patient was encouraged to call [place] [phone] should there be a change in any of these risk factors.    Appearance:   Appropriate   Eye Contact:   Poor  Psychomotor Behavior: Restless   Attitude:   Cooperative   Orientation:   All  Speech   Rate / Production: Monotone    Volume:  Normal   Mood:    Anxious  Depressed  Sad   Affect:    Flat   Thought Content:  Clear   Thought Form:  Coherent  Logical   Insight:    Good     Diagnoses:  1. Severe single current episode of major depressive disorder, without psychotic features (H)    2. Adjustment disorder with anxious mood        Collateral Reports Completed:  Routed note to PCP    Plan: (Homework, other):  Patient was given information about behavioral services and encouraged to schedule a follow up appointment with the clinic [facility] in 2 weeks, patient can schedule sooner if needed.  He was also given information about mental health symptoms and treatment options , Cognitive Behavioral Therapy skills to practice when experiencing anxiety and depression, deep Breathing Strategies to practice when experiencing anxiety and depression and sleep Hygeine recommendations, including a handout with tips and  strategies.  CD Recommendations: No indications of CD issues. Patient will contact his PCP to get started on an anti-depressant.  MICHELLE Mott, [facility]        ______________________________________________________________________    Integrated Primary Care Behavioral Health Treatment Plan    Patient's Name: Dustin Mccoy  YOB: 1975    Date: May 15, 2018    DSM-V Diagnoses: 296.23 (F32.2) Major Depressive Disorder, Single Episode, Severe With anxious distress or Adjustment Disorders  309.24 (F43.22) With anxiety  Psychosocial / Contextual Factors: marital distress  WHODAS: 18    Referral / Collaboration:  Referral to another professional/service is not indicated at this time..    Anticipated number of session or this episode of care: 10      MeasurableTreatment Goal(s) related to diagnosis / functional impairment(s)  Goal 1: Patient will effectively reduce depressive symptoms as evidenced by a reduced PHQ9 score of 5 or less with occurrence of several days or less.        Objective #A (Patient Action)    Patient will Decrease frequency and intensity of feeling down, depressed, hopeless  Identify negative self-talk and behaviors: challenge core beliefs, myths, and actions  Improve concentration, focus, and mindfulness in daily activities   Status: New - Date: 5/15/18     Intervention(s)  [facility] will teach patient about cognitive distortions and ways to appropriately challenge and restructure statements. Encourage patient to use coping statements and positive affirmations.    Objective #B  Patient will use at least 3 coping skills for anxiety management in the next 6 weeks  Improve quantity and quality of night time sleep / decrease daytime naps  Feel less tired and more energy during the day   Status: New - Date: 5/15/18     Intervention(s)  [facility] will teach patient strategies to manage anxiety, such as relaxation and mindfulness. [facility] will also provide psycho-education on sleep hygiene  practices..    Objective #C  Patient will process changes in relationships and set appropriate boundaries.  Status: New- 5/15/18    Intervention(s)  [facility] will help patient process relationship issues. Teach patient ways to set healthy relationship boundaries. Teach and encourage use of assertive skills.    Patient has reviewed and agreed to the above plan.    Written by  MICHELLE Mott, [facility]

## 2018-06-15 NOTE — LETTER
[address] [address]  Phone: [phone]  Fax: [phone]    Elisa 15, 2018        Dustin Mccoy  [address] [facility] [address]          To whom it may concern:    RE: Dustin Mccoy    Patient was seen and treated today at our clinic and missed work.    Please contact me for questions or concerns.      Sincerely,        MICHELLE Denis

## 2018-06-15 NOTE — MR AVS SNAPSHOT
After Visit Summary   6/15/2018    Dustin Mccoy    MRN: [id number]           Patient Information     Date Of Birth          1975        Visit Information        Provider Department      6/15/2018 11:30 AM Janeen Harry LMFT [facility]        Today's Diagnoses     Severe single current episode of major depressive disorder, without psychotic features (H)    -  1    Adjustment disorder with anxious mood           Follow-ups after your visit        Who to contact     If you have questions or need follow up information about today's clinic visit or your schedule please contact [facility] directly at [phone].  Normal or non-critical lab and imaging results will be communicated to you by [employer], letter or phone within 4 business days after the clinic has received the results. If you do not hear from us within 7 days, please contact the clinic through [employer] or phone. If you have a critical or abnormal lab result, we will notify you by phone as soon as possible.  Submit refill requests through [employer] or call your pharmacy and they will forward the refill request to us. Please allow 3 business days for your refill to be completed.          Additional Information About Your Visit        MyChart Information     [employer] gives you secure access to your electronic health record. If you see a primary care provider, you can also send messages to your care team and make appointments. If you have questions, please call your primary care clinic.  If you do not have a primary care provider, please call [phone] and they will assist you.        Care EveryWhere ID     This is your Care EveryWhere ID. This could be used by other organizations to access your [place] medical records  [id number]         Blood Pressure from Last 3 Encounters:   05/31/18 124/80   04/26/18 128/80   11/16/17 118/74    Weight from Last 3 Encounters:   05/31/18 100.6 kg (221 lb 11.2 oz)    04/26/18 102.5 kg (225 lb 14.4 oz)   11/29/17 108 kg (238 lb)              Today, you had the following     No orders found for display       Primary Care Provider Fax #    Physician No Ref-Primary [phone]       No address on file        Equal Access to Services     LOKESH HUSAIN : Hadii layton ku eduadro Soomaali, waaxda luqadaha, qaybta kaalmada adeegyada, jose antonio javanin hayaaleon joseorion griffin larosetteleon . So [facility] [phone].    ATENCIÓN: Si habla español, tiene a larry disposición servicios gratuitos de asistencia lingüística. Llame al [phone].    We comply with applicable federal civil rights laws and [place] laws. We do not discriminate on the basis of race, color, national origin, age, disability, sex, sexual orientation, or gender identity.            Thank you!     Thank you for choosing [facility]  for your care. Our goal is always to provide you with excellent care. Hearing back from our patients is one way we can continue to improve our services. Please take a few minutes to complete the written survey that you may receive in the mail after your visit with us. Thank you!             Your Updated Medication List - Protect others around you: Learn how to safely use, store and throw away your medicines at www.disposemymeds.org.          This list is accurate as of 6/15/18 12:06 PM.  Always use your most recent med list.                   Brand Name Dispense Instructions for use Diagnosis    acetaminophen 325 MG tablet    TYLENOL    100 tablet    Take 2 tablets (650 mg) by mouth every 4 hours as needed for mild pain    Olecranon bursitis of left elbow       colestipol 1 g tablet    COLESTID     Take 1 mg by mouth 1-2 daily        IBUPROFEN PO      As needed        * naproxen 500 MG tablet    NAPROSYN    30 tablet    Take 1 tablet (500 mg) by mouth 2 times daily as needed for moderate pain    Hand injury, right, initial encounter       * naproxen 500 MG tablet    NAPROSYN    30 tablet    Take  1 tablet (500 mg) by mouth 2 times daily as needed for moderate pain    Injury of right hand, subsequent encounter       valACYclovir 1000 mg tablet    VALTREX    20 tablet    Take 1 tablet (1,000 mg) by mouth 2 times daily for 10 days    Herpes simplex virus infection       zolpidem 12.5 MG CR tablet    AMBIEN CR    30 tablet    Take 1 tablet (12.5 mg) by mouth nightly as needed for sleep    Insomnia, unspecified type       * Notice:  This list has 2 medication(s) that are the same as other medications prescribed for you. Read the directions carefully, and ask your doctor or other care provider to review them with you.

## 2018-06-16 ASSESSMENT — PATIENT HEALTH QUESTIONNAIRE - PHQ9: SUM OF ALL RESPONSES TO PHQ QUESTIONS 1-9: 17

## 2018-06-16 ASSESSMENT — ANXIETY QUESTIONNAIRES: GAD7 TOTAL SCORE: 10

## 2018-07-09 ENCOUNTER — OFFICE VISIT (OUTPATIENT)
Dept: BEHAVIORAL HEALTH | Facility: CLINIC | Age: 43
End: 2018-07-09
Payer: COMMERCIAL

## 2018-07-09 DIAGNOSIS — F43.22 ADJUSTMENT DISORDER WITH ANXIOUS MOOD: ICD-10-CM

## 2018-07-09 DIAGNOSIS — F32.2 SEVERE SINGLE CURRENT EPISODE OF MAJOR DEPRESSIVE DISORDER, WITHOUT PSYCHOTIC FEATURES (H): Primary | ICD-10-CM

## 2018-07-09 PROCEDURE — 90834 PSYTX W PT 45 MINUTES: CPT | Performed by: MARRIAGE & FAMILY THERAPIST

## 2018-07-09 NOTE — LETTER
[address] [address]  Phone: [phone]  Fax: [phone]    07/09/18    Dustin Mccoy  [address] [facility] [address]      To whom it may concern:     Please excuse patient from work as he was seen in clinic today. FMLA forms were previously filled out and to my understanding should still be active and in effect. Due to patient's current condition he is only able to work 8 hour shifts, as previously stated. There are times at which he will have flare-ups related to what he is experiencing and he will be gone from work to get back to better functioning. He was gone 5/31, 6/4, 6/12-6/13, and 7/5-7/6 and these were a result of his mental health condition. It is typical for someone with anxiety and depression to have times where increased symptoms worsen and him taking that time allows him to focus on getting himself back to where he can return to his job. Patient continues to work on managing his health appropriately, however there may be times that his FMLA will need to be utilized to take time when the flare-ups and need for appointments for treatment occur.    If you have any further questions or concerns please feel free to contact me at the number above.    Sincerely,        Janeen Harry, [facility]  Behavioral Health Clinician

## 2018-07-09 NOTE — MR AVS SNAPSHOT
After Visit Summary   7/9/2018    Dustin Mccoy    MRN: [id number]           Patient Information     Date Of Birth          1975        Visit Information        Provider Department      7/9/2018 8:00 AM Janeen Harry LMFT [facility]        Today's Diagnoses     Severe single current episode of major depressive disorder, without psychotic features (H)    -  1    Adjustment disorder with anxious mood           Follow-ups after your visit        Who to contact     If you have questions or need follow up information about today's clinic visit or your schedule please contact [facility] directly at [phone].  Normal or non-critical lab and imaging results will be communicated to you by [employer], letter or phone within 4 business days after the clinic has received the results. If you do not hear from us within 7 days, please contact the clinic through [employer] or phone. If you have a critical or abnormal lab result, we will notify you by phone as soon as possible.  Submit refill requests through [employer] or call your pharmacy and they will forward the refill request to us. Please allow 3 business days for your refill to be completed.          Additional Information About Your Visit        MyChart Information     [employer] gives you secure access to your electronic health record. If you see a primary care provider, you can also send messages to your care team and make appointments. If you have questions, please call your primary care clinic.  If you do not have a primary care provider, please call [phone] and they will assist you.        Care EveryWhere ID     This is your Care EveryWhere ID. This could be used by other organizations to access your [place] medical records  [id number]         Blood Pressure from Last 3 Encounters:   05/31/18 124/80   04/26/18 128/80   11/16/17 118/74    Weight from Last 3 Encounters:   05/31/18 100.6 kg (221 lb 11.2 oz)    04/26/18 102.5 kg (225 lb 14.4 oz)   11/29/17 108 kg (238 lb)              Today, you had the following     No orders found for display       Primary Care Provider Fax #    Physician No Ref-Primary [phone]       No address on file        Equal Access to Services     LOKESH HUSAIN : Hadii layton ku eduardo Soomaali, waaxda luqadaha, qaybta kaalmada adeegyada, jose antonio javanin hayaaleon joseorion griffin larosetteleon . So [facility] [phone].    ATENCIÓN: Si habla español, tiene a larry disposición servicios gratuitos de asistencia lingüística. Llame al [phone].    We comply with applicable federal civil rights laws and [place] laws. We do not discriminate on the basis of race, color, national origin, age, disability, sex, sexual orientation, or gender identity.            Thank you!     Thank you for choosing [facility]  for your care. Our goal is always to provide you with excellent care. Hearing back from our patients is one way we can continue to improve our services. Please take a few minutes to complete the written survey that you may receive in the mail after your visit with us. Thank you!             Your Updated Medication List - Protect others around you: Learn how to safely use, store and throw away your medicines at www.disposemymeds.org.          This list is accurate as of 7/9/18  1:42 PM.  Always use your most recent med list.                   Brand Name Dispense Instructions for use Diagnosis    acetaminophen 325 MG tablet    TYLENOL    100 tablet    Take 2 tablets (650 mg) by mouth every 4 hours as needed for mild pain    Olecranon bursitis of left elbow       colestipol 1 g tablet    COLESTID     Take 1 mg by mouth 1-2 daily        IBUPROFEN PO      As needed        * naproxen 500 MG tablet    NAPROSYN    30 tablet    Take 1 tablet (500 mg) by mouth 2 times daily as needed for moderate pain    Hand injury, right, initial encounter       * naproxen 500 MG tablet    NAPROSYN    30 tablet    Take 1  tablet (500 mg) by mouth 2 times daily as needed for moderate pain    Injury of right hand, subsequent encounter       valACYclovir 1000 mg tablet    VALTREX    20 tablet    Take 1 tablet (1,000 mg) by mouth 2 times daily for 10 days    Herpes simplex virus infection       zolpidem 12.5 MG CR tablet    AMBIEN CR    30 tablet    Take 1 tablet (12.5 mg) by mouth nightly as needed for sleep    Insomnia, unspecified type       * Notice:  This list has 2 medication(s) that are the same as other medications prescribed for you. Read the directions carefully, and ask your doctor or other care provider to review them with you.

## 2018-07-09 NOTE — PROGRESS NOTES
[facility] (formerly [facility])[3]  July 9, 2018      Behavioral Health Clinician Progress Note    Patient Name: Dustin Mccoy           Service Type:  Individual      Service Location:   Face to Face in Clinic     Session Start Time: 8:00  Session End Time: 8:40      Session Length: 38 - 52      Attendees: Patient    Visit Activities (Refresh list every visit): [facility] Only    Diagnostic Assessment Date: 5/4/18  Treatment Plan Review Date: 5/15/2018  See Flowsheets for today's PHQ-9 and MAIK-7 results  Previous PHQ-9:   PHQ-9 SCORE 4/26/2018 6/15/2018   Total Score 20 17     Previous MAIK-7:   MAIK-7 SCORE 5/4/2018 6/15/2018   Total Score 11 10       KAMINI LEVEL:  KAMINI Score (Last Two) 11/19/2014   KAMINI Raw Score 47   Activation Score 77.5   KAMINI Level 4       DATA  Extended Session (60+ minutes): No  Interactive Complexity: No  Crisis: No  [facility] Patient: No    Treatment Objective(s) Addressed in This Session:  Target Behavior(s): depression, anxiety and relationship issues    Depressed Mood: Increase interest, engagement, and pleasure in doing things  Decrease frequency and intensity of feeling down, depressed, hopeless  Improve quantity and quality of night time sleep / decrease daytime naps  Feel less tired and more energy during the day   Improve diet, appetite, mindful eating, and / or meal planning  Identify negative self-talk and behaviors: challenge core beliefs, myths, and actions  Improve concentration, focus, and mindfulness in daily activities   Feel less fidgety, restless or slow in daily activities / interpersonal interactions  Anxiety: will experience a reduction in anxiety, will develop more effective coping skills to manage anxiety symptoms, will develop healthy cognitive patterns and beliefs and will increase ability to function adaptively  Adjustment Difficulties: will develop coping/problem-solving skills to facilitate more adaptive adjustment  Relationship Problems: will address relationship  difficulties in a more adaptive manner    Current Stressors / Issues:  Patient continues to struggle with depression symptoms. He reports that he is not getting enough sleep. He states that his wife served him with divorce papers, just over a week ago, and he is still needing to get an  for himself. He reports that he got together with his ex and he thought there was closure to the relationship, however she has continued to contact him which he has told her is confusing to him. Patient states that his symptoms seemed to worsen after this occurred. He states that he took a couple days off work to help him work through his symptoms as he was getting even less sleep.  He states that he also has noticed some dizzy spells and increased headaches. He wonders if these symptoms are related to anxiety. Provided psycho-education on anxiety symptoms and that there can be physiological symptoms. Patient denies having panic attacks.    Reinforced his honesty with his ex and encouraged him to continue with boundary setting. Discussed medication and patient's stigma related to taking psychotropic medication. Patient does believe that it would be a good idea to start a med and plans to contact his PCP to discuss starting one.      Progress on Treatment Objective(s) / Homework:  Worsening - ACTION (Actively working towards change); Intervened by reinforcing change plan / affirming steps taken    Motivational Interviewing    MI Intervention: Expressed Empathy/Understanding, Supported Autonomy, Collaboration, Evocation, Permission to raise concern or advise, Open-ended questions, Reflections: simple and complex, Change talk (evoked) and Reframe     Change Talk Expressed by the Patient: Desire to change Ability to change Reasons to change Need to change Committment to change Activation Taking steps    Provider Response to Change Talk: E - Evoked more info from patient about behavior change, A - Affirmed patient's thoughts,  decisions, or attempts at behavior change, R - Reflected patient's change talk and S - Summarized patient's change talk statements    Also provided psychoeducation about behavioral health condition, symptoms, and treatment options    Skills training    Explored skills useful to client in current situation    Skills include assertiveness, communication, conflict management, problem-solving, relaxation, etc.    Solution-Focused Therapy    Explored patterns in patient's relationships and discussed options for new behaviors    Explored patterns in patient's actions and choices and discussed options for new behaviors    Cognitive-behavioral Therapy    Discussed common cognitive distortions, identified them in patient's life    Explored ways to challenge, replace, and act against these cognitions    Acceptance and Commitment Therapy    Explored and identified important values in patient's life    Discussed ways to commit to behavioral activation around these values    Psychodynamic psychotherapy    Discussed patient's emotional dynamics and issues and how they impact behaviors    Explored patient's history of relationships and how they impact present behaviors    Explored how to work with and make changes in these schemas and patterns    Behavioral Activation    Discussed steps patient can take to become more involved in meaningful activity    Identified barriers to these activities and explored possible solutions    Mindfulness-Based Strategies    Discussed skills based on development and application of mindfulness    Skills drawn from dialectical behavior therapy, mindfulness-based stress reduction, mindfulness-based cognitive therapy, etc.      Care Plan review completed: Yes    Medication Review:  No current psychiatric medications prescribed Patient had not contacted his PCP after his last therapy visit because he thought he was getting better. He does plan to talk with his PCP to discuss starting a  medication.    Medication Compliance:  NA    Changes in Health Issues:   None reported    Chemical Use Review:   Substance Use: Chemical use reviewed, no active concerns identified      Tobacco Use: No current tobacco use.      Assessment: Current Emotional / Mental Status (status of significant symptoms):  Risk status (Self / Other harm or suicidal ideation)  Patient denies a history of suicidal ideation, suicide attempts, self-injurious behavior, homicidal ideation, homicidal behavior and and other safety concerns  Patient denies current fears or concerns for personal safety.  Patient denies current or recent suicidal ideation or behaviors.  Patient denies current or recent homicidal ideation or behaviors.  Patient denies current or recent self injurious behavior or ideation.  Patient denies other safety concerns.  A safety and risk management plan has not been developed at this time, however patient was encouraged to call [place] [phone] should there be a change in any of these risk factors.    Appearance:   Appropriate   Eye Contact:   Poor  Psychomotor Behavior: Restless   Attitude:   Cooperative   Orientation:   All  Speech   Rate / Production: Monotone    Volume:  Normal   Mood:    Anxious  Depressed  Sad   Affect:    Flat   Thought Content:  Clear   Thought Form:  Coherent  Logical   Insight:    Good     Diagnoses:  1. Severe single current episode of major depressive disorder, without psychotic features (H)    2. Adjustment disorder with anxious mood        Collateral Reports Completed:  Routed note to PCP    Plan: (Homework, other):  Patient was given information about behavioral services and encouraged to schedule a follow up appointment with the clinic [facility] in 1 week.  He was also given information about mental health symptoms and treatment options , Cognitive Behavioral Therapy skills to practice when experiencing anxiety and depression, deep Breathing Strategies to practice when experiencing anxiety  and depression and sleep Hygeine recommendations, including a handout with tips and strategies.  CD Recommendations: No indications of CD issues. Patient will contact his PCP to get started on an anti-depressant.  MICHELLE Mott, [facility]        ______________________________________________________________________    Integrated Primary Care Behavioral Health Treatment Plan    Patient's Name: Dustin Mccoy  YOB: 1975    Date: May 15, 2018    DSM-V Diagnoses: 296.23 (F32.2) Major Depressive Disorder, Single Episode, Severe With anxious distress or Adjustment Disorders  309.24 (F43.22) With anxiety  Psychosocial / Contextual Factors: marital distress  WHODAS: 18    Referral / Collaboration:  Referral to another professional/service is not indicated at this time..    Anticipated number of session or this episode of care: 10      MeasurableTreatment Goal(s) related to diagnosis / functional impairment(s)  Goal 1: Patient will effectively reduce depressive symptoms as evidenced by a reduced PHQ9 score of 5 or less with occurrence of several days or less.        Objective #A (Patient Action)    Patient will Decrease frequency and intensity of feeling down, depressed, hopeless  Identify negative self-talk and behaviors: challenge core beliefs, myths, and actions  Improve concentration, focus, and mindfulness in daily activities   Status: New - Date: 5/15/18     Intervention(s)  [facility] will teach patient about cognitive distortions and ways to appropriately challenge and restructure statements. Encourage patient to use coping statements and positive affirmations.    Objective #B  Patient will use at least 3 coping skills for anxiety management in the next 6 weeks  Improve quantity and quality of night time sleep / decrease daytime naps  Feel less tired and more energy during the day   Status: New - Date: 5/15/18     Intervention(s)  [facility] will teach patient strategies to manage anxiety, such as relaxation and  mindfulness. [facility] will also provide psycho-education on sleep hygiene practices..    Objective #C  Patient will process changes in relationships and set appropriate boundaries.  Status: New- 5/15/18    Intervention(s)  [facility] will help patient process relationship issues. Teach patient ways to set healthy relationship boundaries. Teach and encourage use of assertive skills.    Patient has reviewed and agreed to the above plan.    Written by  MICHELLE Mott, [facility]

## 2018-07-09 NOTE — Clinical Note
Jesus Lieberman, I believe I sent you my last note, but I guess Alfredo felt he made some improvement and then didn't contact you and now is feeling he is back to not functioning at his best. He does plan to contact you about starting a medication. Just sending this as an FYI, and I hope he will contact you soon. Thanks, Janeen

## 2018-07-20 ENCOUNTER — MYC MEDICAL ADVICE (OUTPATIENT)
Dept: FAMILY MEDICINE | Facility: OTHER | Age: 43
End: 2018-07-20

## 2018-07-20 DIAGNOSIS — F32.2 SEVERE SINGLE CURRENT EPISODE OF MAJOR DEPRESSIVE DISORDER, WITHOUT PSYCHOTIC FEATURES (H): Primary | ICD-10-CM

## 2018-07-20 DIAGNOSIS — F43.22 ADJUSTMENT DISORDER WITH ANXIOUS MOOD: ICD-10-CM

## 2018-07-20 RX ORDER — CITALOPRAM HYDROBROMIDE 20 MG/1
20 TABLET ORAL DAILY
Qty: 30 TABLET | Refills: 1 | Status: SHIPPED | OUTPATIENT
Start: 2018-07-20 | End: 2018-09-28

## 2018-07-20 NOTE — TELEPHONE ENCOUNTER
Please inform the patient I sent out Celexa 20mg to the pharmacy. He is to take 1 tablet daily. It can take 2-4 weeks before he may notice benefit. Common adverse effects (~10% of all people who take this medication) include drowsiness, feeling sweaty, upset stomach. I would like to see him in 4 weeks to assess tolerance to the medication. Also, recommend switching from NSAIDs (naproxen, ibuprofen, etc) to tylenol while taking this medication.     Marcelino Patrick PA-C on 7/20/2018 at 9:08 AM

## 2018-07-23 ENCOUNTER — TELEPHONE (OUTPATIENT)
Dept: FAMILY MEDICINE | Facility: CLINIC | Age: 43
End: 2018-07-23

## 2018-07-23 NOTE — TELEPHONE ENCOUNTER
Reason for Call:  Other call back    Detailed comments: patient is requesting a note for work from Janeen. The appointment that was canceled today is work related and he needs a note stating that he needs FMLA.      Phone Number Patient can be reached at: Cell number on file:    Telephone Information:   Mobile [phone]       Best Time: anytime    Can we leave a detailed message on this number? YES    Call taken on 7/23/2018 at 9:16 AM by Shannan Mancini

## 2018-07-23 NOTE — LETTER
[address] [address]  Phone: [phone]  Fax: [phone]    07/24/18    Dustin Mccoy  [address] [facility] [address]      To whom it may concern:     Patient had an appointment scheduled in clinic for 7/23/18. This appointment was cancelled earlier that day due to provider being out of office. Please excuse patient for his time off as it relates to his FMLA.    Sincerely,        Janeen Harry MA, FT  Behavioral Health Clinician

## 2018-07-24 NOTE — TELEPHONE ENCOUNTER
Phone Encounter   [facility] spoke with patient to inform him that the letter is completed and will be at the . Patient states that he will be starting his medication this week and that he is taking the whole week off due to his symptoms. Patient is scheduled with this writer for next week and will discuss further at that time.

## 2018-07-31 ENCOUNTER — OFFICE VISIT (OUTPATIENT)
Dept: BEHAVIORAL HEALTH | Facility: CLINIC | Age: 43
End: 2018-07-31
Payer: COMMERCIAL

## 2018-07-31 DIAGNOSIS — F32.2 SEVERE SINGLE CURRENT EPISODE OF MAJOR DEPRESSIVE DISORDER, WITHOUT PSYCHOTIC FEATURES (H): Primary | ICD-10-CM

## 2018-07-31 DIAGNOSIS — F43.22 ADJUSTMENT DISORDER WITH ANXIOUS MOOD: ICD-10-CM

## 2018-07-31 PROCEDURE — 90834 PSYTX W PT 45 MINUTES: CPT | Performed by: MARRIAGE & FAMILY THERAPIST

## 2018-07-31 NOTE — PROGRESS NOTES
[facility]  July 31, 2018      Behavioral Health Clinician Progress Note    Patient Name: Dustin Mccoy           Service Type:  Individual      Service Location:   Face to Face in Clinic     Session Start Time: 8:05  Session End Time: 8:45      Session Length: 38 - 52      Attendees: Patient    Visit Activities (Refresh list every visit): [facility] Only    Diagnostic Assessment Date: 5/4/18  Treatment Plan Review Date: 5/15/2018  See Flowsheets for today's PHQ-9 and MAIK-7 results  Previous PHQ-9:   PHQ-9 SCORE 4/26/2018 6/15/2018   Total Score 20 17     Previous MAIK-7:   MAIK-7 SCORE 5/4/2018 6/15/2018   Total Score 11 10       KAMINI LEVEL:  KAMINI Score (Last Two) 11/19/2014   KAMINI Raw Score 47   Activation Score 77.5   KAMINI Level 4       DATA  Extended Session (60+ minutes): No  Interactive Complexity: No  Crisis: No  [facility] Patient: No    Treatment Objective(s) Addressed in This Session:  Target Behavior(s): depression, anxiety and relationship issues    Depressed Mood: Increase interest, engagement, and pleasure in doing things  Decrease frequency and intensity of feeling down, depressed, hopeless  Improve quantity and quality of night time sleep / decrease daytime naps  Feel less tired and more energy during the day   Improve diet, appetite, mindful eating, and / or meal planning  Identify negative self-talk and behaviors: challenge core beliefs, myths, and actions  Improve concentration, focus, and mindfulness in daily activities   Feel less fidgety, restless or slow in daily activities / interpersonal interactions  Anxiety: will experience a reduction in anxiety, will develop more effective coping skills to manage anxiety symptoms, will develop healthy cognitive patterns and beliefs and will increase ability to function adaptively  Adjustment Difficulties: will develop coping/problem-solving skills to facilitate more adaptive adjustment  Relationship Problems: will address relationship  difficulties in a more adaptive manner    Current Stressors / Issues:  Patient has just started an anti-depressant. He states that he is not sleeping well and is often tired. His boss at work stated that he is going to change his shift. So there has been increased stress at work and he is considering a change in work. He states that he has noticed more anxiety in the last week. Patient is not feeling able to work right now due to his depression.    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 He is weighing this as a possible option when thinking about his future.    Patient and his spouse have been talking about working to improve their marriage. He is maintaining open dialogue with his daughter and letting her know she can talk to him anytime. Patient was given a referral to [facility] therapist, Siri, in [place] for couples counseling.    Progress on Treatment Objective(s) / Homework:  Minimal progress - ACTION (Actively working towards change); Intervened by reinforcing change plan / affirming steps taken    Motivational Interviewing    MI Intervention: Expressed Empathy/Understanding, Supported Autonomy, Collaboration, Evocation, Permission to raise concern or advise, Open-ended questions, Reflections: simple and complex, Change talk (evoked) and Reframe     Change Talk Expressed by the Patient: Desire to change Ability to change Reasons to change Need to change Committment to change Activation Taking steps    Provider Response to Change Talk: E - Evoked more info from patient about behavior change, A - Affirmed patient's thoughts, decisions, or attempts at behavior change, R - Reflected patient's change talk and S - Summarized patient's change talk statements    Also provided psychoeducation about behavioral health condition, symptoms, and treatment options    Skills training    Explored skills useful to client in current situation    Skills include assertiveness, communication, conflict  management, problem-solving, relaxation, etc.    Solution-Focused Therapy    Explored patterns in patient's relationships and discussed options for new behaviors    Explored patterns in patient's actions and choices and discussed options for new behaviors    Cognitive-behavioral Therapy    Discussed common cognitive distortions, identified them in patient's life    Explored ways to challenge, replace, and act against these cognitions    Acceptance and Commitment Therapy    Explored and identified important values in patient's life    Discussed ways to commit to behavioral activation around these values    Psychodynamic psychotherapy    Discussed patient's emotional dynamics and issues and how they impact behaviors    Explored patient's history of relationships and how they impact present behaviors    Explored how to work with and make changes in these schemas and patterns    Behavioral Activation    Discussed steps patient can take to become more involved in meaningful activity    Identified barriers to these activities and explored possible solutions    Mindfulness-Based Strategies    Discussed skills based on development and application of mindfulness    Skills drawn from dialectical behavior therapy, mindfulness-based stress reduction, mindfulness-based cognitive therapy, etc.      Care Plan review completed: Yes    Medication Review:  Changes to psychiatric medications, see updated Medication List in EPIC. Patient was started on Celexa last week.     Medication Compliance:  Yes no side effects noted thus far    Changes in Health Issues:   None reported    Chemical Use Review:   Substance Use: Chemical use reviewed, no active concerns identified      Tobacco Use: No current tobacco use.      Assessment: Current Emotional / Mental Status (status of significant symptoms):  Risk status (Self / Other harm or suicidal ideation)  Patient denies a history of suicidal ideation, suicide attempts, self-injurious behavior,  homicidal ideation, homicidal behavior and and other safety concerns  Patient denies current fears or concerns for personal safety.  Patient denies current or recent suicidal ideation or behaviors.  Patient denies current or recent homicidal ideation or behaviors.  Patient denies current or recent self injurious behavior or ideation.  Patient denies other safety concerns.  A safety and risk management plan has not been developed at this time, however patient was encouraged to call [place] [phone] should there be a change in any of these risk factors.    Appearance:   Appropriate   Eye Contact:   Poor  Psychomotor Behavior: Restless   Attitude:   Cooperative   Orientation:   All  Speech   Rate / Production: Monotone    Volume:  Normal   Mood:    Anxious  Depressed  Sad   Affect:    Flat   Thought Content:  Clear   Thought Form:  Coherent  Logical   Insight:    Good     Diagnoses:  1. Severe single current episode of major depressive disorder, without psychotic features (H)    2. Adjustment disorder with anxious mood        Collateral Reports Completed:  Not Applicable    Plan: (Homework, other):  Patient was given information about behavioral services and encouraged to schedule a follow up appointment with the clinic [facility] in 1 week.  He was also given information about mental health symptoms and treatment options , Cognitive Behavioral Therapy skills to practice when experiencing anxiety and depression, deep Breathing Strategies to practice when experiencing anxiety and depression and sleep Hygeine recommendations, including a handout with tips and strategies.  CD Recommendations: No indications of CD issues.  MICHELLE Mott, [facility]        ______________________________________________________________________    Integrated Primary Care Behavioral Health Treatment Plan    Patient's Name: Dustin Mccoy  YOB: 1975    Date: May 15, 2018    DSM-V Diagnoses: 296.23 (F32.2) Major Depressive Disorder,  Single Episode, Severe With anxious distress or Adjustment Disorders  309.24 (F43.22) With anxiety  Psychosocial / Contextual Factors: marital distress  WHODAS: 18    Referral / Collaboration:  Referral to another professional/service is not indicated at this time..    Anticipated number of session or this episode of care: 10      MeasurableTreatment Goal(s) related to diagnosis / functional impairment(s)  Goal 1: Patient will effectively reduce depressive symptoms as evidenced by a reduced PHQ9 score of 5 or less with occurrence of several days or less.        Objective #A (Patient Action)    Patient will Decrease frequency and intensity of feeling down, depressed, hopeless  Identify negative self-talk and behaviors: challenge core beliefs, myths, and actions  Improve concentration, focus, and mindfulness in daily activities   Status: New - Date: 5/15/18     Intervention(s)  [facility] will teach patient about cognitive distortions and ways to appropriately challenge and restructure statements. Encourage patient to use coping statements and positive affirmations.    Objective #B  Patient will use at least 3 coping skills for anxiety management in the next 6 weeks  Improve quantity and quality of night time sleep / decrease daytime naps  Feel less tired and more energy during the day   Status: New - Date: 5/15/18     Intervention(s)  [facility] will teach patient strategies to manage anxiety, such as relaxation and mindfulness. [facility] will also provide psycho-education on sleep hygiene practices..    Objective #C  Patient will process changes in relationships and set appropriate boundaries.  Status: New- 5/15/18    Intervention(s)  [facility] will help patient process relationship issues. Teach patient ways to set healthy relationship boundaries. Teach and encourage use of assertive skills.    Patient has reviewed and agreed to the above plan.    Written by  MICHELLE Mott, [facility]

## 2018-07-31 NOTE — MR AVS SNAPSHOT
After Visit Summary   7/31/2018    Dustin Mccoy    MRN: [id number]           Patient Information     Date Of Birth          1975        Visit Information        Provider Department      7/31/2018 8:00 AM Janeen Harry LMFT [facility]        Today's Diagnoses     Severe single current episode of major depressive disorder, without psychotic features (H)    -  1    Adjustment disorder with anxious mood           Follow-ups after your visit        Your next 10 appointments already scheduled     Aug 23, 2018  9:00 AM CDT   Return Visit with MICHELLE Denis   [facility] ([facility]    [address] [place] [id number] [phone]              Who to contact     If you have questions or need follow up information about today's clinic visit or your schedule please contact [facility] directly at [phone].  Normal or non-critical lab and imaging results will be communicated to you by MyChart, letter or phone within 4 business days after the clinic has received the results. If you do not hear from us within 7 days, please contact the clinic through Fanboutshart or phone. If you have a critical or abnormal lab result, we will notify you by phone as soon as possible.  Submit refill requests through CaLivingBenefits or call your pharmacy and they will forward the refill request to us. Please allow 3 business days for your refill to be completed.          Additional Information About Your Visit        MyChart Information     CaLivingBenefits gives you secure access to your electronic health record. If you see a primary care provider, you can also send messages to your care team and make appointments. If you have questions, please call your primary care clinic.  If you do not have a primary care provider, please call [phone] and they will assist you.        Care EveryWhere ID     This is your Care EveryWhere ID. This could be used by  other organizations to access your [facility] medical records  [id number]         Blood Pressure from Last 3 Encounters:   05/31/18 124/80   04/26/18 128/80   11/16/17 118/74    Weight from Last 3 Encounters:   05/31/18 100.6 kg (221 lb 11.2 oz)   04/26/18 102.5 kg (225 lb 14.4 oz)   11/29/17 108 kg (238 lb)              Today, you had the following     No orders found for display       Primary Care Provider Fax #    Physician No Ref-Primary [phone]       No address on file        Equal Access to Services     [facility] : Hadii layton gutierrez Socecilia, waaxda lunitinadaha, qaybta kaalmada kellie, jose antonio roberson . So [facility] [phone].    ATENCIÓN: Si habla español, tiene a larry disposición servicios gratuitos de asistencia lingüística. [facility] [phone].    We comply with applicable federal civil rights laws and [place] laws. We do not discriminate on the basis of race, color, national origin, age, disability, sex, sexual orientation, or gender identity.            Thank you!     Thank you for choosing [facility]  for your care. Our goal is always to provide you with excellent care. Hearing back from our patients is one way we can continue to improve our services. Please take a few minutes to complete the written survey that you may receive in the mail after your visit with us. Thank you!             Your Updated Medication List - Protect others around you: Learn how to safely use, store and throw away your medicines at www.disposemymeds.org.          This list is accurate as of 7/31/18 11:59 PM.  Always use your most recent med list.                   Brand Name Dispense Instructions for use Diagnosis    acetaminophen 325 MG tablet    TYLENOL    100 tablet    Take 2 tablets (650 mg) by mouth every 4 hours as needed for mild pain    Olecranon bursitis of left elbow       citalopram 20 MG tablet    celeXA    30 tablet    Take 1 tablet (20 mg) by mouth daily     Severe single current episode of major depressive disorder, without psychotic features (H), Adjustment disorder with anxious mood       colestipol 1 g tablet    COLESTID     Take 1 mg by mouth 1-2 daily        IBUPROFEN PO      As needed        * naproxen 500 MG tablet    NAPROSYN    30 tablet    Take 1 tablet (500 mg) by mouth 2 times daily as needed for moderate pain    Hand injury, right, initial encounter       * naproxen 500 MG tablet    NAPROSYN    30 tablet    Take 1 tablet (500 mg) by mouth 2 times daily as needed for moderate pain    Injury of right hand, subsequent encounter       valACYclovir 1000 mg tablet    VALTREX    20 tablet    Take 1 tablet (1,000 mg) by mouth 2 times daily for 10 days    Herpes simplex virus infection       zolpidem 12.5 MG CR tablet    AMBIEN CR    30 tablet    Take 1 tablet (12.5 mg) by mouth nightly as needed for sleep    Insomnia, unspecified type       * Notice:  This list has 2 medication(s) that are the same as other medications prescribed for you. Read the directions carefully, and ask your doctor or other care provider to review them with you.

## 2018-07-31 NOTE — LETTER
[address] [address]  Phone: [phone]  Fax: [phone]    07/31/18    Dustin Mccoy  [address] [facility] [address]      To whom it may concern:     I am writing on behalf of my patient, who was seen in clinic today to address personal concerns. Please excuse him from work per his FMLA as of today 7/31/2018 and through 8/20/18.    Please contact me with any further questions.      Sincerely,        MICHELLE Denis

## 2018-08-17 NOTE — TELEPHONE ENCOUNTER
Patient is calling back to request a note for employer to be off work again next week, (hoping medications will work a bit better), asking to go back to work on 8.26 with no restrictions, please advise.  Patient was advised of 3 business days, requesting to  on Monday.  Thank you,  Elicia Mckeon  Patient Representative

## 2018-08-20 NOTE — TELEPHONE ENCOUNTER
Patient is calling stating he was supposed to call and explain why he still needs a letter to stay home from work. The medication that he is currently taking is not working. Is hoping that it will take effect soon. Has been having a lot of anxiety attacks.   Thank you,  Ning Trivedi   for [facility]

## 2018-08-20 NOTE — TELEPHONE ENCOUNTER
Phone Encounter   [facility] for patient requesting a returned call with more info regarding need for letter and difficulty with being at work. Patient provided phone number for clinic.

## 2018-08-20 NOTE — TELEPHONE ENCOUNTER
Phone Encounter   [facility] spoke with patient regarding letter. Patient reports that he has been experiencing panic attacks. 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 Patient was recommended to schedule with his PCP. Reflected that he was due to return after four weeks from starting the med, which is now. Patient states that he will do this. He is scheduled with this writer later this week. Patient was informed that the letter will be at the  for him to  at his convenience.

## 2018-08-23 ENCOUNTER — OFFICE VISIT (OUTPATIENT)
Dept: BEHAVIORAL HEALTH | Facility: CLINIC | Age: 43
End: 2018-08-23
Payer: COMMERCIAL

## 2018-08-23 DIAGNOSIS — F43.22 ADJUSTMENT DISORDER WITH ANXIOUS MOOD: ICD-10-CM

## 2018-08-23 DIAGNOSIS — F32.2 SEVERE SINGLE CURRENT EPISODE OF MAJOR DEPRESSIVE DISORDER, WITHOUT PSYCHOTIC FEATURES (H): Primary | ICD-10-CM

## 2018-08-23 PROCEDURE — 90834 PSYTX W PT 45 MINUTES: CPT | Performed by: MARRIAGE & FAMILY THERAPIST

## 2018-08-23 ASSESSMENT — ANXIETY QUESTIONNAIRES
6. BECOMING EASILY ANNOYED OR IRRITABLE: MORE THAN HALF THE DAYS
7. FEELING AFRAID AS IF SOMETHING AWFUL MIGHT HAPPEN: SEVERAL DAYS
5. BEING SO RESTLESS THAT IT IS HARD TO SIT STILL: NEARLY EVERY DAY
2. NOT BEING ABLE TO STOP OR CONTROL WORRYING: MORE THAN HALF THE DAYS
1. FEELING NERVOUS, ANXIOUS, OR ON EDGE: MORE THAN HALF THE DAYS
3. WORRYING TOO MUCH ABOUT DIFFERENT THINGS: NEARLY EVERY DAY
GAD7 TOTAL SCORE: 15
IF YOU CHECKED OFF ANY PROBLEMS ON THIS QUESTIONNAIRE, HOW DIFFICULT HAVE THESE PROBLEMS MADE IT FOR YOU TO DO YOUR WORK, TAKE CARE OF THINGS AT HOME, OR GET ALONG WITH OTHER PEOPLE: VERY DIFFICULT

## 2018-08-23 ASSESSMENT — PATIENT HEALTH QUESTIONNAIRE - PHQ9: 5. POOR APPETITE OR OVEREATING: MORE THAN HALF THE DAYS

## 2018-08-23 NOTE — PROGRESS NOTES
[facility]  August 23, 2018      Behavioral Health Clinician Progress Note    Patient Name: Dustin Mccoy           Service Type:  Individual      Service Location:   Face to Face in Clinic     Session Start Time: 9:20  Session End Time: 9:55      Session Length: 38 - 52      Attendees: Patient    Visit Activities (Refresh list every visit): [facility] Only    Diagnostic Assessment Date: 5/4/18  Treatment Plan Review Date: 8/23/2018  See Flowsheets for today's PHQ-9 and MAIK-7 results  Previous PHQ-9:   PHQ-9 SCORE 4/26/2018 6/15/2018 8/23/2018   Total Score 20 17 20     Previous MAIK-7:   MAIK-7 SCORE 5/4/2018 6/15/2018 8/23/2018   Total Score 11 10 15       KAMINI LEVEL:  KAMINI Score (Last Two) 11/19/2014   KAMINI Raw Score 47   Activation Score 77.5   KAMINI Level 4       DATA  Extended Session (60+ minutes): No  Interactive Complexity: No  Crisis: No  [facility] Patient: No    Treatment Objective(s) Addressed in This Session:  Target Behavior(s): depression, anxiety and relationship issues    Depressed Mood: Increase interest, engagement, and pleasure in doing things  Decrease frequency and intensity of feeling down, depressed, hopeless  Improve quantity and quality of night time sleep / decrease daytime naps  Feel less tired and more energy during the day   Improve diet, appetite, mindful eating, and / or meal planning  Identify negative self-talk and behaviors: challenge core beliefs, myths, and actions  Improve concentration, focus, and mindfulness in daily activities   Feel less fidgety, restless or slow in daily activities / interpersonal interactions  Anxiety: will experience a reduction in anxiety, will develop more effective coping skills to manage anxiety symptoms, will develop healthy cognitive patterns and beliefs and will increase ability to function adaptively  Adjustment Difficulties: will develop coping/problem-solving skills to facilitate more adaptive adjustment  Relationship Problems:  will address relationship difficulties in a more adaptive manner    Current Stressors / Issues:  Patient reports that his shift changed to second shift, and he feels this is worse for him as there is more going on and he is worried about his level of personal distraction interfering with his job performance.    Patient states that he went to visit his sister last week in the state of [place]. He states that he went on a big hike and found himself feeling good being out on the mountains and in nature.    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    Patient continues to have difficulty with his marriage and there is ambivalence about working on the relationship versus getting a divorce. He states that the woman he had been in a relationship with has also continued to reach out to him despite her own engagement.     Discussed boundary setting with his previous relationship and being more clear about his needs.     Patient continues to struggle with sleep. Reviewed and encouraged use of sleep hygiene tips. Recommend he use progressive muscle relaxation, which can be found on youtube, to help him relax prior to sleep.    Progress on Treatment Objective(s) / Homework:  Minimal progress - ACTION (Actively working towards change); Intervened by reinforcing change plan / affirming steps taken    Motivational Interviewing    MI Intervention: Expressed Empathy/Understanding, Supported Autonomy, Collaboration, Evocation, Permission to raise concern or advise, Open-ended questions, Reflections: simple and complex, Change talk (evoked) and Reframe     Change Talk Expressed by the Patient: Desire to change Ability to change Reasons to change Need to change Committment to change Activation Taking steps    Provider Response to Change Talk: E - Evoked more info from patient about behavior change, A - Affirmed patient's thoughts, decisions, or attempts at behavior change, R - Reflected  patient's change talk and S - Summarized patient's change talk statements    Also provided psychoeducation about behavioral health condition, symptoms, and treatment options    Skills training    Explored skills useful to client in current situation    Skills include assertiveness, communication, conflict management, problem-solving, relaxation, etc.    Solution-Focused Therapy    Explored patterns in patient's relationships and discussed options for new behaviors    Explored patterns in patient's actions and choices and discussed options for new behaviors    Cognitive-behavioral Therapy    Discussed common cognitive distortions, identified them in patient's life    Explored ways to challenge, replace, and act against these cognitions    Acceptance and Commitment Therapy    Explored and identified important values in patient's life    Discussed ways to commit to behavioral activation around these values    Psychodynamic psychotherapy    Discussed patient's emotional dynamics and issues and how they impact behaviors    Explored patient's history of relationships and how they impact present behaviors    Explored how to work with and make changes in these schemas and patterns    Behavioral Activation    Discussed steps patient can take to become more involved in meaningful activity    Identified barriers to these activities and explored possible solutions    Mindfulness-Based Strategies    Discussed skills based on development and application of mindfulness    Skills drawn from dialectical behavior therapy, mindfulness-based stress reduction, mindfulness-based cognitive therapy, etc.      Care Plan review completed: Yes    Medication Review:  No changes to current psychiatric medication(s)     Medication Compliance:  Yes ; however patient has not noticed any symptom improvement or change. Recommended patient schedule with his provider to discuss medication and possibly changing.    Changes in Health Issues:   None  reported    Chemical Use Review:   Substance Use: Chemical use reviewed, no active concerns identified      Tobacco Use: No current tobacco use.      Assessment: Current Emotional / Mental Status (status of significant symptoms):  Risk status (Self / Other harm or suicidal ideation)  Patient denies a history of suicidal ideation, suicide attempts, self-injurious behavior, homicidal ideation, homicidal behavior and and other safety concerns  Patient denies current fears or concerns for personal safety.  Patient denies current or recent suicidal ideation or behaviors.  Patient denies current or recent homicidal ideation or behaviors.  Patient denies current or recent self injurious behavior or ideation.  Patient denies other safety concerns.  A safety and risk management plan has not been developed at this time, however patient was encouraged to call [place] [phone] should there be a change in any of these risk factors.    Appearance:   Appropriate   Eye Contact:   Poor  Psychomotor Behavior: Restless   Attitude:   Cooperative   Orientation:   All  Speech   Rate / Production: Monotone    Volume:  Normal   Mood:    Anxious  Depressed  Sad   Affect:    Flat   Thought Content:  Clear   Thought Form:  Coherent  Logical   Insight:    Good     Diagnoses:  1. Severe single current episode of major depressive disorder, without psychotic features (H)    2. Adjustment disorder with anxious mood        Collateral Reports Completed:  Routed note to PCP  Letter for work    Plan: (Homework, other):  Patient was given information about behavioral services and encouraged to schedule a follow up appointment with the clinic [facility] in 1 week.  He was also given information about mental health symptoms and treatment options , Cognitive Behavioral Therapy skills to practice when experiencing anxiety and depression, deep Breathing Strategies to practice when experiencing anxiety and depression and sleep Hygeine recommendations, including  a handout with tips and strategies.  CD Recommendations: No indications of CD issues. Patient will schedule with his PCP to follow up on medication.  MICHELLE Mott, [facility]        ______________________________________________________________________    Integrated Primary Care Behavioral Health Treatment Plan    Patient's Name: Dustin Mccoy  YOB: 1975    Date: May 15, 2018    DSM-V Diagnoses: 296.23 (F32.2) Major Depressive Disorder, Single Episode, Severe With anxious distress or Adjustment Disorders  309.24 (F43.22) With anxiety  Psychosocial / Contextual Factors: marital distress  WHODAS: 18    Referral / Collaboration:  Referral to another professional/service is not indicated at this time..    Anticipated number of session or this episode of care: 10      MeasurableTreatment Goal(s) related to diagnosis / functional impairment(s)  Goal 1: Patient will effectively reduce depressive symptoms as evidenced by a reduced PHQ9 score of 5 or less with occurrence of several days or less.        Objective #A (Patient Action)    Patient will Decrease frequency and intensity of feeling down, depressed, hopeless  Identify negative self-talk and behaviors: challenge core beliefs, myths, and actions  Improve concentration, focus, and mindfulness in daily activities   Status: New - Date: 5/15/18; Continued: 8/23/2018    Intervention(s)  [facility] will teach patient about cognitive distortions and ways to appropriately challenge and restructure statements. Encourage patient to use coping statements and positive affirmations.    Objective #B  Patient will use at least 3 coping skills for anxiety management in the next 6 weeks  Improve quantity and quality of night time sleep / decrease daytime naps  Feel less tired and more energy during the day   Status: New - Date: 5/15/18; Continued: 8/23/2018    Intervention(s)  [facility] will teach patient strategies to manage anxiety, such as relaxation and mindfulness. [facility] will  also provide psycho-education on sleep hygiene practices..    Objective #C  Patient will process changes in relationships and set appropriate boundaries.  Status: New- 5/15/18; Continued: 8/23/2018    Intervention(s)  [facility] will help patient process relationship issues. Teach patient ways to set healthy relationship boundaries. Teach and encourage use of assertive skills.    Patient has reviewed and agreed to the above plan.    Written by  MICHELLE Mott, [facility]

## 2018-08-23 NOTE — MR AVS SNAPSHOT
After Visit Summary   8/23/2018    Dustin Mccoy    MRN: [id number]           Patient Information     Date Of Birth          1975        Visit Information        Provider Department      8/23/2018 9:00 AM Janeen Harry LMFT [facility]        Today's Diagnoses     Severe single current episode of major depressive disorder, without psychotic features (H)    -  1    Adjustment disorder with anxious mood           Follow-ups after your visit        Who to contact     If you have questions or need follow up information about today's clinic visit or your schedule please contact [facility] directly at [phone].  Normal or non-critical lab and imaging results will be communicated to you by iSpyehart, letter or phone within 4 business days after the clinic has received the results. If you do not hear from us within 7 days, please contact the clinic through iSpyehart or phone. If you have a critical or abnormal lab result, we will notify you by phone as soon as possible.  Submit refill requests through Fliplife or call your pharmacy and they will forward the refill request to us. Please allow 3 business days for your refill to be completed.          Additional Information About Your Visit        MyChart Information     Fliplife gives you secure access to your electronic health record. If you see a primary care provider, you can also send messages to your care team and make appointments. If you have questions, please call your primary care clinic.  If you do not have a primary care provider, please call [phone] and they will assist you.        Care EveryWhere ID     This is your Care EveryWhere ID. This could be used by other organizations to access your [facility] medical records  [id number]         Blood Pressure from Last 3 Encounters:   05/31/18 124/80   04/26/18 128/80   11/16/17 118/74    Weight from Last 3 Encounters:   05/31/18 100.6 kg (221 lb 11.2 oz)    04/26/18 102.5 kg (225 lb 14.4 oz)   11/29/17 108 kg (238 lb)              Today, you had the following     No orders found for display       Primary Care Provider Fax #    Physician No Ref-Primary [phone]       No address on file        Equal Access to Services     LOKESH HUSAIN : Hadii layton ku eduardo Soomaali, waaxda luqadaha, qaybta kaalmada adeegyada, jose antonio javanin hayaaleon joseorion griffin larosetteleon . So [facility] [phone].    ATENCIÓN: Si habla español, tiene a larry disposición servicios gratuitos de asistencia lingüística. Llame al [phone].    We comply with applicable federal civil rights laws and [place] laws. We do not discriminate on the basis of race, color, national origin, age, disability, sex, sexual orientation, or gender identity.            Thank you!     Thank you for choosing [facility]  for your care. Our goal is always to provide you with excellent care. Hearing back from our patients is one way we can continue to improve our services. Please take a few minutes to complete the written survey that you may receive in the mail after your visit with us. Thank you!             Your Updated Medication List - Protect others around you: Learn how to safely use, store and throw away your medicines at www.disposemymeds.org.          This list is accurate as of 8/23/18 10:06 AM.  Always use your most recent med list.                   Brand Name Dispense Instructions for use Diagnosis    acetaminophen 325 MG tablet    TYLENOL    100 tablet    Take 2 tablets (650 mg) by mouth every 4 hours as needed for mild pain    Olecranon bursitis of left elbow       citalopram 20 MG tablet    celeXA    30 tablet    Take 1 tablet (20 mg) by mouth daily    Severe single current episode of major depressive disorder, without psychotic features (H), Adjustment disorder with anxious mood       colestipol 1 g tablet    COLESTID     Take 1 mg by mouth 1-2 daily        IBUPROFEN PO      As needed        *  naproxen 500 MG tablet    NAPROSYN    30 tablet    Take 1 tablet (500 mg) by mouth 2 times daily as needed for moderate pain    Hand injury, right, initial encounter       * naproxen 500 MG tablet    NAPROSYN    30 tablet    Take 1 tablet (500 mg) by mouth 2 times daily as needed for moderate pain    Injury of right hand, subsequent encounter       valACYclovir 1000 mg tablet    VALTREX    20 tablet    Take 1 tablet (1,000 mg) by mouth 2 times daily for 10 days    Herpes simplex virus infection       zolpidem 12.5 MG CR tablet    AMBIEN CR    30 tablet    Take 1 tablet (12.5 mg) by mouth nightly as needed for sleep    Insomnia, unspecified type       * Notice:  This list has 2 medication(s) that are the same as other medications prescribed for you. Read the directions carefully, and ask your doctor or other care provider to review them with you.

## 2018-08-23 NOTE — Clinical Note
Jesus Lieberman, I just met with Alfredo. He is not making any improvement and his PHQ9 and GAD7 scores are worse. I told him to schedule with you to follow up on his medications. I think he maybe needs to try something different that can help more with the depression, like Zoloft or something. I trust your judgment on the medication piece, let me know if you'd like to consult further or if you have any other concerns. Thanks, Janeen

## 2018-08-24 ASSESSMENT — ANXIETY QUESTIONNAIRES: GAD7 TOTAL SCORE: 15

## 2018-08-24 ASSESSMENT — PATIENT HEALTH QUESTIONNAIRE - PHQ9: SUM OF ALL RESPONSES TO PHQ QUESTIONS 1-9: 20

## 2018-09-24 ENCOUNTER — TELEPHONE (OUTPATIENT)
Dept: FAMILY MEDICINE | Facility: CLINIC | Age: 43
End: 2018-09-24

## 2018-09-24 NOTE — TELEPHONE ENCOUNTER
Reason for Call:  Other call back    Detailed comments: Patient is calling to ask Janeen HARRIS, the name of the marriage counselor, patient lost the card she gave.  Please advise    Phone Number Patient can be reached at: Cell number on file:    Telephone Information:   Mobile [phone]       Best Time:     Can we leave a detailed message on this number? YES    Call taken on 9/24/2018 at 11:57 AM by Elicia Mckeon

## 2018-09-25 NOTE — TELEPHONE ENCOUNTER
Phone Encounter   [facility] returned call to patient. CLAIR stating that the marriage therapist was Siri Donovan at [facility], phone number for scheduling is [phone].

## 2018-09-27 ENCOUNTER — TELEPHONE (OUTPATIENT)
Dept: FAMILY MEDICINE | Facility: OTHER | Age: 43
End: 2018-09-27

## 2018-09-27 NOTE — TELEPHONE ENCOUNTER
I can see him at 11:40.  Have him arrive around 11:20.     Electronically signed by Christi Olivera CNP.

## 2018-09-27 NOTE — TELEPHONE ENCOUNTER
Reason for Call:  Same Day Appointment, Requested Provider:  Any [facility] provider    PCP: No Ref-Primary, Physician    Reason for visit: possible hernia    Duration of symptoms: noted this week    Have you been treated for this in the past? No    Additional comments: asking to see any [place] Provider tomorrow, 9/28    Can we leave a detailed message on this number? YES    Phone number patient can be reached at: Cell number on file:    Telephone Information:   Mobile [phone]       Best Time: any time    Call taken on 9/27/2018 at 8:13 AM by More Brunner

## 2018-09-28 ENCOUNTER — OFFICE VISIT (OUTPATIENT)
Dept: FAMILY MEDICINE | Facility: OTHER | Age: 43
End: 2018-09-28
Payer: COMMERCIAL

## 2018-09-28 VITALS
TEMPERATURE: 95.5 F | BODY MASS INDEX: 32.34 KG/M2 | OXYGEN SATURATION: 99 % | SYSTOLIC BLOOD PRESSURE: 138 MMHG | HEART RATE: 82 BPM | DIASTOLIC BLOOD PRESSURE: 86 MMHG | WEIGHT: 231 LBS | RESPIRATION RATE: 20 BRPM

## 2018-09-28 DIAGNOSIS — Z23 NEED FOR PROPHYLACTIC VACCINATION AND INOCULATION AGAINST INFLUENZA: ICD-10-CM

## 2018-09-28 DIAGNOSIS — K40.90 UNILATERAL INGUINAL HERNIA WITHOUT OBSTRUCTION OR GANGRENE, RECURRENCE NOT SPECIFIED: Primary | ICD-10-CM

## 2018-09-28 PROCEDURE — 99213 OFFICE O/P EST LOW 20 MIN: CPT | Mod: 25 | Performed by: NURSE PRACTITIONER

## 2018-09-28 PROCEDURE — 90471 IMMUNIZATION ADMIN: CPT | Performed by: NURSE PRACTITIONER

## 2018-09-28 PROCEDURE — 90686 IIV4 VACC NO PRSV 0.5 ML IM: CPT | Performed by: NURSE PRACTITIONER

## 2018-09-28 ASSESSMENT — PAIN SCALES - GENERAL: PAINLEVEL: MODERATE PAIN (4)

## 2018-09-28 NOTE — MR AVS SNAPSHOT
After Visit Summary   9/28/2018    Dustin Mccoy    MRN: [id number]           Patient Information     Date Of Birth          1975        Visit Information        Provider Department      9/28/2018 11:40 AM Christi Olivera APRN [facility]        Today's Diagnoses     Need for prophylactic vaccination and inoculation against influenza    -  1    Unilateral inguinal hernia without obstruction or gangrene, recurrence not specified          Care Instructions    See the general surgeon in [place].       Hernia (Adult)    A hernia can happen when there is a weakness or defect in the wall of the abdomen or groin. Intestines or nearby tissues may move from their usual location and push through the weakness in the wall. This can cause a hernia (bulge) you may see or feel.  Causes and risk factors   A hernia may be present at birth. Or it may be caused by the wear and tear of daily living. Certain factors can make a hernia more likely. These can include:    Heavy lifting    Straining, whether from lifting, movement, or constipation    Chronic cough    Injury to the abdominal wall    Excess weight    Pregnancy    Prior surgery    Older age    Family history of hernia  Symptoms  Symptoms of a hernia may come on suddenly. Or they may appear slowly over time. Some common symptoms include:    Bulge in the groin area, around the navel, or in the scrotum (the bulge may get bigger when you stand and go away when you lie down)    Pain or pressure around the bulge    Pain during activities such as lifting, coughing, or sneezing    A feeling of weakness or pressure in the groin    Pain or swelling in the scrotum  Types of hernias  There are different types of hernia. The type you have depends on its location:    Inguinal. This type is in the groin or scrotum. It is more common in men.    Femoral. This type is in the groin, upper thigh (where the leg bends), or labia. It is more common in  women.    Ventral. This type is in the abdominal wall.    Umbilical. This type occurs around the navel (belly button).    Incisional. This type occurs at the site of a previous surgery.  The condition of the hernia can help determine how urgently it needs to be treated.    Reducible. It goes back in by itself, or it can be pushed back in.    Irreducible. It can t be pushed back in.    Incarcerated/strangulated. The intestine is trapped (incarcerated). If this happens, you won t be able to push the bulge back in. If the incarcerated hernia isn t treated, it may become strangulated. This means the area loses blood supply and the tissue may die. This requires emergency surgery. You need treatment right away.  In most cases, a hernia will not heal on its own. Surgery is usually needed to repair the defect in the abdominal wall or groin. You ll be told more about surgery, if needed.  If your symptoms are not severe, treatment may sometimes be delayed. In such cases, regular follow-up visits with the provider will be needed. You ll be asked to keep track of your symptoms and to watch for signs of more serious problems. You may also be given guidelines similar to the home care instructions below.  Home care  To help keep a hernia from getting worse, you may be advised to:    Avoid heavy lifting and straining as directed.    Take steps to prevent constipation, such as eating more fiber and drinking more water. This may help reduce straining that can occur when having a bowel movement. Reducing straining may help keep your symptoms from getting worse.    Maintain a healthy weight or lose excess weight. This can help reduce strain on abdominal muscles and tissues.    Stop smoking. This can help prevent coughing that may also strain abdominal muscles and tissues.  Follow-up care  Follow up with your healthcare provider, or as directed. If imaging tests were done, they will be reviewed a doctor. You will be told the results and  any new findings that may affect your care.  When to seek medical advice  Call your healthcare provider right away if any of these occur:    Hernia hardens, swells, or grows larger    Hernia can no longer be pushed back in    Pain moves to the lower right abdomen (just below the waistline), or spreads to the back  Call 911  Call 911 if any of these occur:    Nausea and vomiting    Severe pain, redness, or tenderness in the area near the hernia    Pain worsens quickly and doesn t get better    Inability to have a bowel movement or pass gas    Fever of 100.4 F (38 C) or higher, or as directed by your healthcare provider    Trouble breathing    Fainting    Rapid heart rate    Vomiting blood    Large amounts of blood in stool  Date Last Reviewed: 6/9/2015 2000-2017 [employer]. [address] [place] [address], [place] [address]. All rights reserved. This information is not intended as a substitute for professional medical care. Always follow your healthcare professional's instructions.                Follow-ups after your visit        Additional Services     GENERAL SURG ADULT REFERRAL       Your provider has referred you to: [facility]: [facility] [phone]   [email or web address]    Please be aware that coverage of these services is subject to the terms and limitations of your health insurance plan.  Call member services at your health plan with any benefit or coverage questions.      Please bring the following with you to your appointment:    (1) Any X-Rays, CTs or MRIs which have been performed.  Contact the facility where they were done to arrange for  prior to your scheduled appointment.   (2) List of current medications   (3) This referral request   (4) Any documents/labs given to you for this referral                  Your next 10 appointments already scheduled     Oct 01, 2018 10:30 AM CDT   New Visit with Skylar Dudley MD   [facility]  ([facility])    [address]  [facility] [id number] [phone]              Who to contact     If you have questions or need follow up information about today's clinic visit or your schedule please contact [facility] directly at [phone].  Normal or non-critical lab and imaging results will be communicated to you by MyChart, letter or phone within 4 business days after the clinic has received the results. If you do not hear from us within 7 days, please contact the clinic through [employer] or phone. If you have a critical or abnormal lab result, we will notify you by phone as soon as possible.  Submit refill requests through [employer] or call your pharmacy and they will forward the refill request to us. Please allow 3 business days for your refill to be completed.          Additional Information About Your Visit        MyChart Information     [employer] gives you secure access to your electronic health record. If you see a primary care provider, you can also send messages to your care team and make appointments. If you have questions, please call your primary care clinic.  If you do not have a primary care provider, please call [phone] and they will assist you.        Care EveryWhere ID     This is your Care EveryWhere ID. This could be used by other organizations to access your [place] medical records  [id number]        Your Vitals Were     Pulse Temperature Respirations Pulse Oximetry BMI (Body Mass Index)       82 95.5  F (35.3  C) (Tympanic) 20 99% 32.34 kg/m2        Blood Pressure from Last 3 Encounters:   09/28/18 (!) 150/94   05/31/18 124/80   04/26/18 128/80    Weight from Last 3 Encounters:   09/28/18 231 lb (104.8 kg)   05/31/18 221 lb 11.2 oz (100.6 kg)   04/26/18 225 lb 14.4 oz (102.5 kg)              We Performed the Following     FLU VACCINE, SPLIT VIRUS, IM (QUADRIVALENT) [22718]- >3 YRS     GENERAL SURG ADULT REFERRAL     Vaccine Administration,  Initial [[id number]]        Primary Care Provider Fax #    Physician No Ref-Primary [phone]       No address on file        Equal Access to Services     LOKESH HUSAIN : Hadii aad ku hadhancory Cole, titimelina palaciosbarryha, bart hopkins, jose antonio velasquez. So [facility] [phone].    ATENCIÓN: Si habla español, tiene a larry disposición servicios gratuitos de asistencia lingüística. Llame al [phone].    We comply with applicable federal civil rights laws and [place] laws. We do not discriminate on the basis of race, color, national origin, age, disability, sex, sexual orientation, or gender identity.            Thank you!     Thank you for choosing [facility]  for your care. Our goal is always to provide you with excellent care. Hearing back from our patients is one way we can continue to improve our services. Please take a few minutes to complete the written survey that you may receive in the mail after your visit with us. Thank you!             Your Updated Medication List - Protect others around you: Learn how to safely use, store and throw away your medicines at www.disposemymeds.org.      Notice  As of 9/28/2018 11:51 AM    You have not been prescribed any medications.

## 2018-09-28 NOTE — LETTER
[facility]  [address] [facility] [address]  Phone: [phone]    September 28, 2018        Dustin Mccoy  [address] [facility] [address]          To whom it may concern:    RE: Dustin Mccoy    Patient was seen and treated today at our clinic and missed work.    Please contact me for questions or concerns.      Sincerely,        THANH Burton CNP

## 2018-09-28 NOTE — PROGRESS NOTES
SUBJECTIVE:   Dustin Mccoy is a 43 year old male who presents to clinic today for the following health issues:      Abdominal Pain      Duration: 5 days    Description (location/character/radiation): right groin pain       Associated flank pain: None    Intensity:  moderate    Accompanying signs and symptoms:        Fever/Chills: YES- chills       Gas/Bloating: no        Nausea/vomitting: no        Diarrhea: no        Dysuria or Hematuria: no     History (previous similar pain/trauma/previous testing): was hiking all Sunday    Precipitating or alleviating factors:       Pain worse with eating/BM/urination: NO       Pain relieved by BM: no     Therapies tried and outcome: ibuprofen takes the edge off      He was hiking 5 days ago.  When he got home, he noticed right groin pain and a bulge in that area.  This pain intensified and kept him up most of the night.  By the next day, the bulge was gone and the pain was improving slightly.  He still has pain over the right groin.  Worse with bending or lifting.    No previous hernias or abdominal surgeries.  Has never had pain like this before.       Problem list and histories reviewed & adjusted, as indicated.  Additional history: as documented    Patient Active Problem List   Diagnosis     H/O irritable bowel syndrome     Atypical chest pain     Olecranon bursitis of left elbow     Cellulitis of left upper extremity     Cellulitis of left elbow     Contusion of right hand, subsequent encounter     Severe single current episode of major depressive disorder, without psychotic features (H)     Adjustment disorder with anxious mood     Past Surgical History:   Procedure Laterality Date     NO HISTORY OF SURGERY         Social History   Substance Use Topics     Smoking status: Never Smoker     Smokeless tobacco: Never Used     Alcohol use Yes      Comment: occasional     Family History   Problem Relation Age of Onset     Diabetes Father          No current outpatient  prescriptions on file.     Allergies   Allergen Reactions     Bactrim [Sulfamethoxazole W/Trimethoprim]      Erythromycin      Sulfa Drugs      BP Readings from Last 3 Encounters:   09/28/18 138/86   05/31/18 124/80   04/26/18 128/80    Wt Readings from Last 3 Encounters:   09/28/18 231 lb (104.8 kg)   05/31/18 221 lb 11.2 oz (100.6 kg)   04/26/18 225 lb 14.4 oz (102.5 kg)                    Reviewed and updated as needed this visit by clinical staff  Tobacco  Allergies  Meds  Soc Hx      Reviewed and updated as needed this visit by Provider         ROS:  Constitutional, HEENT, cardiovascular, pulmonary, gi and gu systems are negative, except as otherwise noted.    OBJECTIVE:     /86  Pulse 82  Temp 95.5  F (35.3  C) (Tympanic)  Resp 20  Wt 231 lb (104.8 kg)  SpO2 99%  BMI 32.34 kg/m2  Body mass index is 32.34 kg/(m^2).  GENERAL: healthy, alert and no distress  RESP: lungs clear to auscultation - no rales, rhonchi or wheezes  CV: regular rate and rhythm, normal S1 S2, no S3 or S4, no murmur, click or rub,  ABDOMEN: soft, bowel sounds normal.  He has tenderness over the right inguinal area.  I cannot appreciate a hernia, but exam is somewhat limited by pain.     Diagnostic Test Results:  none     ASSESSMENT/PLAN:         1. Unilateral inguinal hernia without obstruction or gangrene, recurrence not specified  Will refer to general surgeon  - GENERAL SURG ADULT REFERRAL    2. Need for prophylactic vaccination and inoculation against influenza  - FLU VACCINE, SPLIT VIRUS, IM (QUADRIVALENT) [04576]- >3 YRS  - Vaccine Administration, Initial [87425]    See Patient Instructions    THANH Burton [facility]      Injectable Influenza Immunization Documentation    1.  Is the person to be vaccinated sick today?   No    2. Does the person to be vaccinated have an allergy to a component   of the vaccine?   No  Egg Allergy Algorithm Link    3. Has the person to be vaccinated ever had a  serious reaction   to influenza vaccine in the past?   No    4. Has the person to be vaccinated ever had Guillain-Barré syndrome?   No    Form completed by ................Mahamed Mackenzie LPN,   September 28, 2018,      11:36 AM,   [facility]

## 2018-09-28 NOTE — LETTER
[facility]  [address] [facility] [address]  Phone: [phone]    September 28, 2018        Dustin Mccoy  [address] [facility] [address]          To whom it may concern:    RE: Dustin Mccoy    Patient was seen and treated today at our clinic and missed work.  He can return to work Saturday without restrictions.     Please contact me for questions or concerns.      Sincerely,        THANH Burton CNP

## 2018-09-28 NOTE — PATIENT INSTRUCTIONS
See the general surgeon in [place].       Hernia (Adult)    A hernia can happen when there is a weakness or defect in the wall of the abdomen or groin. Intestines or nearby tissues may move from their usual location and push through the weakness in the wall. This can cause a hernia (bulge) you may see or feel.  Causes and risk factors   A hernia may be present at birth. Or it may be caused by the wear and tear of daily living. Certain factors can make a hernia more likely. These can include:    Heavy lifting    Straining, whether from lifting, movement, or constipation    Chronic cough    Injury to the abdominal wall    Excess weight    Pregnancy    Prior surgery    Older age    Family history of hernia  Symptoms  Symptoms of a hernia may come on suddenly. Or they may appear slowly over time. Some common symptoms include:    Bulge in the groin area, around the navel, or in the scrotum (the bulge may get bigger when you stand and go away when you lie down)    Pain or pressure around the bulge    Pain during activities such as lifting, coughing, or sneezing    A feeling of weakness or pressure in the groin    Pain or swelling in the scrotum  Types of hernias  There are different types of hernia. The type you have depends on its location:    Inguinal. This type is in the groin or scrotum. It is more common in men.    Femoral. This type is in the groin, upper thigh (where the leg bends), or labia. It is more common in women.    Ventral. This type is in the abdominal wall.    Umbilical. This type occurs around the navel (belly button).    Incisional. This type occurs at the site of a previous surgery.  The condition of the hernia can help determine how urgently it needs to be treated.    Reducible. It goes back in by itself, or it can be pushed back in.    Irreducible. It can t be pushed back in.    Incarcerated/strangulated. The intestine is trapped (incarcerated). If this happens, you won t be able to push the bulge  back in. If the incarcerated hernia isn t treated, it may become strangulated. This means the area loses blood supply and the tissue may die. This requires emergency surgery. You need treatment right away.  In most cases, a hernia will not heal on its own. Surgery is usually needed to repair the defect in the abdominal wall or groin. You ll be told more about surgery, if needed.  If your symptoms are not severe, treatment may sometimes be delayed. In such cases, regular follow-up visits with the provider will be needed. You ll be asked to keep track of your symptoms and to watch for signs of more serious problems. You may also be given guidelines similar to the home care instructions below.  Home care  To help keep a hernia from getting worse, you may be advised to:    Avoid heavy lifting and straining as directed.    Take steps to prevent constipation, such as eating more fiber and drinking more water. This may help reduce straining that can occur when having a bowel movement. Reducing straining may help keep your symptoms from getting worse.    Maintain a healthy weight or lose excess weight. This can help reduce strain on abdominal muscles and tissues.    Stop smoking. This can help prevent coughing that may also strain abdominal muscles and tissues.  Follow-up care  Follow up with your healthcare provider, or as directed. If imaging tests were done, they will be reviewed a doctor. You will be told the results and any new findings that may affect your care.  When to seek medical advice  Call your healthcare provider right away if any of these occur:    Hernia hardens, swells, or grows larger    Hernia can no longer be pushed back in    Pain moves to the lower right abdomen (just below the waistline), or spreads to the back  Call 911  Call 911 if any of these occur:    Nausea and vomiting    Severe pain, redness, or tenderness in the area near the hernia    Pain worsens quickly and doesn t get better    Inability  to have a bowel movement or pass gas    Fever of 100.4 F (38 C) or higher, or as directed by your healthcare provider    Trouble breathing    Fainting    Rapid heart rate    Vomiting blood    Large amounts of blood in stool  Date Last Reviewed: 6/9/2015 2000-2017 [employer]. [address] [place] [address]. All rights reserved. This information is not intended as a substitute for professional medical care. Always follow your healthcare professional's instructions.

## 2018-09-28 NOTE — NURSING NOTE
Prior to injection verified patient identity using patient's name and date of birth.  Due to injection administration, patient instructed to remain in clinic for 15 minutes  afterwards, and to report any adverse reaction to me immediately.    ................Mahamed Mackenzie LPN,   September 28, 2018,      11:36 AM,   [facility]

## 2018-10-09 ENCOUNTER — TELEPHONE (OUTPATIENT)
Dept: SURGERY | Facility: CLINIC | Age: 43
End: 2018-10-09

## 2018-10-09 ENCOUNTER — OFFICE VISIT (OUTPATIENT)
Dept: SURGERY | Facility: CLINIC | Age: 43
End: 2018-10-09
Payer: COMMERCIAL

## 2018-10-09 VITALS
WEIGHT: 234.3 LBS | DIASTOLIC BLOOD PRESSURE: 90 MMHG | OXYGEN SATURATION: 99 % | SYSTOLIC BLOOD PRESSURE: 136 MMHG | TEMPERATURE: 97.8 F | HEIGHT: 71 IN | HEART RATE: 84 BPM | BODY MASS INDEX: 32.8 KG/M2

## 2018-10-09 DIAGNOSIS — K40.90 RIGHT INGUINAL HERNIA: Primary | ICD-10-CM

## 2018-10-09 PROCEDURE — 99204 OFFICE O/P NEW MOD 45 MIN: CPT | Performed by: SURGERY

## 2018-10-09 ASSESSMENT — PAIN SCALES - GENERAL: PAINLEVEL: MILD PAIN (3)

## 2018-10-09 NOTE — PROGRESS NOTES
General Surgery Consultation    Dustin Mccoy MRN# [id number]   Age: 43 year old YOB: 1975     Reason for consult: Inguinal hernia, rifght                        Assessment and Plan:   I was asked to see this patient at the request of Christi Olivera CNP for evaluation of right groin pain.  Dustin Mccoy is a 43 year old male who presented with history, exam, laboratory and imaging most consistent with:        ICD-10-CM    1. Right inguinal hernia K40.90 Naila-Operative Worksheet       The patient was thoroughly counseled regarding Right inguinal hernia [K40.90].  Alfredo is looking to have this be repaired in December.  We discussed visiting this topic 1-2 weeks before his scheduled surgery date.  And reevaluate the hernia at that time to see we are still able to do this laparoscopically.  Understands and agrees with this plan.  We went through the risks benefits and alternatives of the procedure as below.    The patient was informed that the proposed procedure or medical intervention involves reduction and repair with or without mesh and does offer a very good likelihood of symptom relief. We also discussed the options of repairing the hernia laparoscopically versus open. Patient opted for laparoscopic repair.      The patient was made aware of the risks of the procedure, including but not limited to:  nerve entrapment or injury, persistence of pain (10%), injury to the bowel/bladder, infertility, ischemic orchitis (rare), Injury to the vas deferens (rare), hematoma, mesh migration, mesh infection, cardiac or pulmonary complication and anesthesia related complications also that difficulties may be encountered during recovery to include: wound infection, recurrence (5-10%), seroma, hematoma and chronic pain.     In the course of the evaluation we did discuss other therapeutic options with the patient, including continued watchful waiting. The risks and benefits of these options were also discussed  which include but are not limited to: incarceration and/or strangulation..     Also discussed were possible problems or difficulties the patient may encounter if treatment was not pursued at this time.     The patient was informed that RogelioDominique Dudley MD will be primarily responsible for the procedure. Assistance during the procedure and during hospitalization may also be provided by other physicians, nurses and technicians.     The patient was also informed that if exposure to the patient s blood or body fluids occurs during the procedure, HIV testing of the patient will occur unless they refuse at this time. Risk of blood transfusion is minimal.     The patient will be provided additional education resources by the support staff. If there are ever any questions regarding their diagnosis or the procedure, the patient is encouraged to ask.     All of the patient s or their legal representative s questions have been answered to their satisfaction and they have indicated a clear understanding of this discussion.   Dustin expressed understanding of risks, benefits and alternatives and wished to proceed.     All findings, test results, and diagnosis were discussed with the patient. Dustin  participated in the decision making process and agreed with the plan of care. Questions were sought and answered.     I thank Christi Olivera CNP for the opportunity to participate in the patient's care.           Chief Complaint:   Right groin pain     History is obtained from the patient         History of Present Illness:   This patient is a 43 year old  male without a significant past medical history who presents with right groin pain.  Alfredo stated he has had this pain off and on for many years.  Initially he thought it was a muscle pulled as the pain does go away intermittently.  However a few weeks ago he and the family went hiking which exacerbate the pain in the right groin.  Rated the pain is sharp and goes from the  right groin into the right testicle.  Patient stated that the pain tends to increase when he does heavy lifting or increases his activities.  Patient thought he felt a bulge in this right groin however he is really not sure.  He denies any urinary symptoms, no GI symptoms.  Stated that he does have irritable bowel syndrome but he has had no bloating, nausea, vomiting.  He does not have any abdominal pain.  Patient denies any abdominal past surgical history.  Alfredo is not a smoker never been a smoker.  Currently not on any medications.  He works at the [employer] and is a .          Past Medical History:    has a past medical history of Atypical chest pain (12/28/2015) and H/O irritable bowel syndrome (12/28/2015).          Past Surgical History:     Past Surgical History:   Procedure Laterality Date     NO HISTORY OF SURGERY             Medications:     No current outpatient prescriptions on file prior to visit.  No current facility-administered medications on file prior to visit.       Allergies:      Allergies   Allergen Reactions     Bactrim [Sulfamethoxazole W/Trimethoprim]      Erythromycin      Sulfa Drugs             Social History:   Dustin Mccoy  reports that he has never smoked. He has never used smokeless tobacco. He reports that he drinks alcohol. He reports that he does not use illicit drugs.          Family History:   The patient has no family history of any bleeding, clotting or anesthesia problems.          Review of Systems:     Constitutional: Denies fever or chills   Eyes: Denies change in visual acuity   HENT: Denies nasal congestion or sore throat   Respiratory: Denies cough or shortness of breath   Cardiovascular: Denies chest pain or edema   GI: Denies abdominal pain, nausea, vomiting, bloody stools or diarrheal +right groin pain  : Denies dysuria  Musculoskeletal: Denies back pain or joint pain   Integument: Denies rash   Neurologic: Denies headache, focal weakness  or sensory changes   Endocrine: Denies polyuria or polydipsia   Lymphatic: Denies swollen glands   Psychiatric: Denies depression or anxiety          Physical Exam:     Constitutional: Awake, alert, no acute distress.  Eyes:  No scleral icterus.  Conjunctiva are without injection.  ENMT: Mucous membranes moist, dentition and gums are intact.   Neck: Soft, supple, trachea midline.    Endocrine: The thyroid is without masses and mobile with swallow.   Lymphatic: There is no cervical, submandibular, or inguinal adenopathy.  Respiratory: Lungs are clear to auscultation and percussion bilaterally.   Cardiovascular: Regular rate and rhythm. No murmurs, rubs, or gallops.    Abdomen: Non-distended, non-tender, normoactive bowel sounds present, No masses, +right inguinal hernia, reducible - small 2 cm hernia content (likely fat vs bowel) at the external ring; tender on palpation and reduction.  Neg left inguinal hernia.  neg flank tenderness. No hepatosplenomegaly.   Musculoskeletal: No spinal or CVA tenderness. Full range of motion in the upper and lower extremities.    Skin: No skin rashes or lesions to inspection.  No petechia.    Neurologic: Cranial nerves II through XII are grossly intact and symmetric.  Psychiatric: The patient is alert and oriented times 3.  The patient's affect is not blunted and mood is appropriate.          Data:   WBC -   WBC   Date Value Ref Range Status   01/03/2017 7.6 4.0 - 11.0 10e9/L Final   ], HgB - Hemoglobin   Date Value Ref Range Status   01/03/2017 13.2 (L) 13.3 - 17.7 g/dL Final   ]   Liver Function Studies - No results for input(s): PROTTOTAL, ALBUMIN, BILITOTAL, ALKPHOS, AST, ALT, BILIDIRECT in the last 41496 hours.  No results found for this or any previous visit (from the past 744 hour(s)).     [facility], DO 10/9/2018 9:35 AM

## 2018-10-09 NOTE — MR AVS SNAPSHOT
After Visit Summary   10/9/2018    Dustin Mccoy    MRN: [id number]           Patient Information     Date Of Birth          1975        Visit Information        Provider Department      10/9/2018 8:30 AM Skylar Dudlye MD [facility]        Today's Diagnoses     Right inguinal hernia    -  1       Follow-ups after your visit        Your next 10 appointments already scheduled     Nov 02, 2018 11:00 AM CDT   New Visit with Siri SIERRA [facility] ([facility])    [address]  [facility] [address] [phone]            Nov 12, 2018 10:00 AM CST   Return Visit with Siri SIERRA [facility] ([facility])    [address]  [facility] [address] [phone]              Who to contact     If you have questions or need follow up information about today's clinic visit or your schedule please contact [facility] directly at [phone].  Normal or non-critical lab and imaging results will be communicated to you by THE MELThart, letter or phone within 4 business days after the clinic has received the results. If you do not hear from us within 7 days, please contact the clinic through [employer] or phone. If you have a critical or abnormal lab result, we will notify you by phone as soon as possible.  Submit refill requests through FAD ? IO or call your pharmacy and they will forward the refill request to us. Please allow 3 business days for your refill to be completed.          Additional Information About Your Visit        THE MELThart Information     FAD ? IO gives you secure access to your electronic health record. If you see a primary care provider, you can also send messages to your care team and make appointments. If you have questions, please call your primary care clinic.  If you do not have a primary care provider, please call [phone] and they will assist you.       "  Care EveryWhere ID     This is your Care EveryWhere ID. This could be used by other organizations to access your [place] medical records  [id number]        Your Vitals Were     Pulse Temperature Height Pulse Oximetry BMI (Body Mass Index)       84 97.8  F (36.6  C) (Temporal) 1.8 m (5' 10.87\") 99% 32.8 kg/m2        Blood Pressure from Last 3 Encounters:   10/09/18 136/90   09/28/18 138/86   05/31/18 124/80    Weight from Last 3 Encounters:   10/09/18 106.3 kg (234 lb 4.8 oz)   09/28/18 104.8 kg (231 lb)   05/31/18 100.6 kg (221 lb 11.2 oz)              We Performed the Following     Naila-Operative Worksheet        Primary Care Provider Fax #    Physician No Ref-Primary [phone]       No address on file        Equal Access to Services     BETH HUSAIN : Hadii layton Cole, waaxda keith, qaybta kaalmada kellie, jose antonio roberson . So [facility] [phone].    ATENCIÓN: Si habla español, tiene a larry disposición servicios gratuitos de asistencia lingüística. Llame al [phone].    We comply with applicable federal civil rights laws and [place] laws. We do not discriminate on the basis of race, color, national origin, age, disability, sex, sexual orientation, or gender identity.            Thank you!     Thank you for choosing [facility]  for your care. Our goal is always to provide you with excellent care. Hearing back from our patients is one way we can continue to improve our services. Please take a few minutes to complete the written survey that you may receive in the mail after your visit with us. Thank you!             Your Updated Medication List - Protect others around you: Learn how to safely use, store and throw away your medicines at www.disposemymeds.org.      Notice  As of 10/9/2018  9:43 AM    You have not been prescribed any medications.      "

## 2018-10-09 NOTE — LETTER
"    10/9/2018         RE: Dustin Mccoy  [address] [facility] [address]        Dear Colleague,    Thank you for referring your patient, Dustin Mccoy, to the [facility]. Please see a copy of my visit note below.    /90 (BP Location: Right arm, Patient Position: Sitting, Cuff Size: Adult Large)  Pulse 84  Temp 97.8  F (36.6  C) (Temporal)  Ht 1.8 m (5' 10.87\")  Wt 106.3 kg (234 lb 4.8 oz)  SpO2 99%  BMI 32.8 kg/m2  Body mass index is 32.8 kg/(m^2).  5' 10.87\"  234 lbs 4.8 oz        Kelsea Linares MA on 10/9/2018 at 8:37 AM      General Surgery Consultation    Dustin Mccoy MRN# [id number]   Age: 43 year old YOB: 1975     Reason for consult: Inguinal hernia, rifght                        Assessment and Plan:   I was asked to see this patient at the request of Christi Olivera CNP for evaluation of right groin pain.  Dustin Mccoy is a 43 year old male who presented with history, exam, laboratory and imaging most consistent with:        ICD-10-CM    1. Right inguinal hernia K40.90 Naila-Operative Worksheet       The patient was thoroughly counseled regarding Right inguinal hernia [K40.90].  Alfredo is looking to have this be repaired in December.  We discussed visiting this topic 1-2 weeks before his scheduled surgery date.  And reevaluate the hernia at that time to see we are still able to do this laparoscopically.  Understands and agrees with this plan.  We went through the risks benefits and alternatives of the procedure as below.    The patient was informed that the proposed procedure or medical intervention involves reduction and repair with or without mesh and does offer a very good likelihood of symptom relief. We also discussed the options of repairing the hernia laparoscopically versus open. Patient opted for laparoscopic repair.      The patient was made aware of the risks of the procedure, including but not limited to:  nerve entrapment or injury, " persistence of pain (10%), injury to the bowel/bladder, infertility, ischemic orchitis (rare), Injury to the vas deferens (rare), hematoma, mesh migration, mesh infection, cardiac or pulmonary complication and anesthesia related complications also that difficulties may be encountered during recovery to include: wound infection, recurrence (5-10%), seroma, hematoma and chronic pain.     In the course of the evaluation we did discuss other therapeutic options with the patient, including continued watchful waiting. The risks and benefits of these options were also discussed which include but are not limited to: incarceration and/or strangulation..     Also discussed were possible problems or difficulties the patient may encounter if treatment was not pursued at this time.     The patient was informed that [facility] MD Octavia will be primarily responsible for the procedure. Assistance during the procedure and during hospitalization may also be provided by other physicians, nurses and technicians.     The patient was also informed that if exposure to the patient s blood or body fluids occurs during the procedure, HIV testing of the patient will occur unless they refuse at this time. Risk of blood transfusion is minimal.     The patient will be provided additional education resources by the support staff. If there are ever any questions regarding their diagnosis or the procedure, the patient is encouraged to ask.     All of the patient s or their legal representative s questions have been answered to their satisfaction and they have indicated a clear understanding of this discussion.   Dustin expressed understanding of risks, benefits and alternatives and wished to proceed.     All findings, test results, and diagnosis were discussed with the patient. Dustin  participated in the decision making process and agreed with the plan of care. Questions were sought and answered.     I thank Christi Olivera CNP for the opportunity to  participate in the patient's care.           Chief Complaint:   Right groin pain     History is obtained from the patient         History of Present Illness:   This patient is a 43 year old  male without a significant past medical history who presents with right groin pain.  Alfredo stated he has had this pain off and on for many years.  Initially he thought it was a muscle pulled as the pain does go away intermittently.  However a few weeks ago he and the family went hiking which exacerbate the pain in the right groin.  Rated the pain is sharp and goes from the right groin into the right testicle.  Patient stated that the pain tends to increase when he does heavy lifting or increases his activities.  Patient thought he felt a bulge in this right groin however he is really not sure.  He denies any urinary symptoms, no GI symptoms.  Stated that he does have irritable bowel syndrome but he has had no bloating, nausea, vomiting.  He does not have any abdominal pain.  Patient denies any abdominal past surgical history.  Alfredo is not a smoker never been a smoker.  Currently not on any medications.  He works at the [employer] and is a .          Past Medical History:    has a past medical history of Atypical chest pain (12/28/2015) and H/O irritable bowel syndrome (12/28/2015).          Past Surgical History:     Past Surgical History:   Procedure Laterality Date     NO HISTORY OF SURGERY             Medications:     No current outpatient prescriptions on file prior to visit.  No current facility-administered medications on file prior to visit.       Allergies:      Allergies   Allergen Reactions     Bactrim [Sulfamethoxazole W/Trimethoprim]      Erythromycin      Sulfa Drugs             Social History:   Dustin FABIAN Nadine  reports that he has never smoked. He has never used smokeless tobacco. He reports that he drinks alcohol. He reports that he does not use illicit drugs.          Family  History:   The patient has no family history of any bleeding, clotting or anesthesia problems.          Review of Systems:     Constitutional: Denies fever or chills   Eyes: Denies change in visual acuity   HENT: Denies nasal congestion or sore throat   Respiratory: Denies cough or shortness of breath   Cardiovascular: Denies chest pain or edema   GI: Denies abdominal pain, nausea, vomiting, bloody stools or diarrheal +right groin pain  : Denies dysuria  Musculoskeletal: Denies back pain or joint pain   Integument: Denies rash   Neurologic: Denies headache, focal weakness or sensory changes   Endocrine: Denies polyuria or polydipsia   Lymphatic: Denies swollen glands   Psychiatric: Denies depression or anxiety          Physical Exam:     Constitutional: Awake, alert, no acute distress.  Eyes:  No scleral icterus.  Conjunctiva are without injection.  ENMT: Mucous membranes moist, dentition and gums are intact.   Neck: Soft, supple, trachea midline.    Endocrine: The thyroid is without masses and mobile with swallow.   Lymphatic: There is no cervical, submandibular, or inguinal adenopathy.  Respiratory: Lungs are clear to auscultation and percussion bilaterally.   Cardiovascular: Regular rate and rhythm. No murmurs, rubs, or gallops.    Abdomen: Non-distended, non-tender, normoactive bowel sounds present, No masses, +right inguinal hernia, reducible - small 2 cm hernia content (likely fat vs bowel) at the external ring; tender on palpation and reduction.  Neg left inguinal hernia.  neg flank tenderness. No hepatosplenomegaly.   Musculoskeletal: No spinal or CVA tenderness. Full range of motion in the upper and lower extremities.    Skin: No skin rashes or lesions to inspection.  No petechia.    Neurologic: Cranial nerves II through XII are grossly intact and symmetric.  Psychiatric: The patient is alert and oriented times 3.  The patient's affect is not blunted and mood is appropriate.          Data:   WBC -   WBC    Date Value Ref Range Status   01/03/2017 7.6 4.0 - 11.0 10e9/L Final   ], HgB - Hemoglobin   Date Value Ref Range Status   01/03/2017 13.2 (L) 13.3 - 17.7 g/dL Final   ]   Liver Function Studies - No results for input(s): PROTTOTAL, ALBUMIN, BILITOTAL, ALKPHOS, AST, ALT, BILIDIRECT in the last 42142 hours.  No results found for this or any previous visit (from the past 744 hour(s)).     Skylar Dudley DO 10/9/2018 9:35 AM           Again, thank you for allowing me to participate in the care of your patient.        Sincerely,        Skylar Dudley MD

## 2018-10-09 NOTE — PROGRESS NOTES
"/90 (BP Location: Right arm, Patient Position: Sitting, Cuff Size: Adult Large)  Pulse 84  Temp 97.8  F (36.6  C) (Temporal)  Ht 1.8 m (5' 10.87\")  Wt 106.3 kg (234 lb 4.8 oz)  SpO2 99%  BMI 32.8 kg/m2  Body mass index is 32.8 kg/(m^2).  5' 10.87\"  234 lbs 4.8 oz        Kelsea Linares MA on 10/9/2018 at 8:37 AM    "

## 2018-11-02 ENCOUNTER — OFFICE VISIT (OUTPATIENT)
Dept: PSYCHOLOGY | Facility: CLINIC | Age: 43
End: 2018-11-02
Payer: COMMERCIAL

## 2018-11-02 DIAGNOSIS — F32.2 SEVERE SINGLE CURRENT EPISODE OF MAJOR DEPRESSIVE DISORDER, WITHOUT PSYCHOTIC FEATURES (H): Primary | ICD-10-CM

## 2018-11-02 PROCEDURE — 90847 FAMILY PSYTX W/PT 50 MIN: CPT | Performed by: MARRIAGE & FAMILY THERAPIST

## 2018-11-02 NOTE — MR AVS SNAPSHOT
MRN:[id number]                      After Visit Summary   11/2/2018    Dustin Mccoy    MRN: [id number]           Visit Information        Provider Department      11/2/2018 11:00 AM Siri Donovan [facility]      Your next 10 appointments already scheduled     Nov 12, 2018 10:00 AM CST   Return Visit with Siri Bradshawlure   [facility] ([facility])    [address]  [facility] [address]   [phone]            Nov 27, 2018  9:00 AM CST   Return Visit with Siri MARIELA BradshawDonovan   [facility] ([facility])    [address]  [facility] [address]   [phone]            Dec 11, 2018  9:00 AM CST   Return Visit with Siri MARIELA Donovan   [facility] ([facility])    [address]  [facility] [address]   [phone]              MyChart Information     [employer] gives you secure access to your electronic health record. If you see a primary care provider, you can also send messages to your care team and make appointments. If you have questions, please call your primary care clinic.  If you do not have a primary care provider, please call [phone] and they will assist you.        Care EveryWhere ID     This is your Care EveryWhere ID. This could be used by other organizations to access your [place] medical records  [id number]        Equal Access to Services     LOKESH HUSAIN AH: Hadii layton chapao Socecilia, waaxda luqadaha, qaybta kaalmada adeegyada, jose antonio stringer adeorion velasquez. So [facility] [phone].    ATENCIÓN: Si habla español, tiene a larry disposición servicios gratuitos de asistencia lingüística. Llame al [phone].    We comply with applicable federal civil rights laws and [place] laws. We do not discriminate on the basis of race, color, national origin, age, disability, sex, sexual orientation, or  gender identity.

## 2018-11-02 NOTE — Clinical Note
Janeen- Saw Alfredo and wife today for intake. I believe the first session went well.  I see them again in 10 days.   Siri Donovan MA, [facility]

## 2018-11-02 NOTE — PROGRESS NOTES
Progress Note    Client Name: Dustin Mccoy  Date: 11/2/2018         Service Type: Family with client present      Session Start Time: 11am  Session End Time: 11:50am      Session Length: 50 mins     Session #: 1     Attendees: Client and Spouse / Significant Other    Treatment Plan Last Reviewed: NA 1st session with this provider  PHQ-9 / MAIK-7 : declined     DATA      Progress Since Last Session (Related to Symptoms / Goals / Homework):   Symptoms: No change depression, anxiety, lack of trust    Homework: NA      Episode of Care Goals: Satisfactory progress - CONTEMPLATION (Considering change and yet undecided); Intervened by assessing the negative and positive thinking (ambivalence) about behavior change     Current / Ongoing Stressors and Concerns:   Occupational, marital/relationships, financial     Treatment Objective(s) Addressed in This Session:   Consider ways to build trust, increase communication with wife  Establish rapport     Intervention:   CBT, couples counseling   Alfredo is a transfer client from [facility] for couples counseling. He is here with wife of 13 yrs. They report their marriage has been conflicted and falling apart for the past few years due to client's infidelity. Wife had filed for divorce but has put the divorce on hold to try to work on their relationship. Client reports he has ended his infidelity. They desire to work on re building trust. Wife reports she has a difficult time trusting client as he often is not where he states he is going. Discussed ways that wife and client feel they could re build trust and what wife would need to see to feel he is trustworthy. Discussed possible use of [employer], changing phone number, getting rid of social media accounts, and plan to talk on these options later. Discussed building up common interests and enjoying each others company. Discussed client's dissatisfaction at FT job and how it impacts  him and family. Discussed that client is looking into other job options. Discussed use of cool down times frames, scheduling time to talk rather than walking away from conflict, and use of talking/listening turns.      ASSESSMENT: Current Emotional / Mental Status (status of significant symptoms):   Risk status (Self / Other harm or suicidal ideation)   Client denies current fears or concerns for personal safety.   Client denies current or recent suicidal ideation or behaviors.   Client denies current or recent homicidal ideation or behaviors.   Client denies current or recent self injurious behavior or ideation.   Client denies other safety concerns.   Client Client reports there has been a change in risk factors since their last session.  working on marital relationship, and ending extra marital relationships   Client Client reports there has been no change in protective factors since their last session.     A safety and risk management plan has not been developed at this time, however client was given the after-hours number / 911 should there be a change in any of these risk factors.     Appearance:   Appropriate    Eye Contact:   Good    Psychomotor Behavior: Normal    Attitude:   Cooperative  Guarded    Orientation:   All   Speech    Rate / Production: Normal     Volume:  Normal    Mood:    Anxious  Depressed    Affect:    Appropriate    Thought Content:  Clear    Thought Form:  Coherent  Logical    Insight:    Fair      Medication Review:   No current psychiatric medications prescribed    No current outpatient prescriptions on file.     No current facility-administered medications for this visit.         Medication Compliance:   NA     Changes in Health Issues:   None reported     Chemical Use Review:   Substance Use: Chemical use reviewed, no active concerns identified      Tobacco Use: No current tobacco use.       Collateral Reports Completed:   Routed note to Care Team Member(s)- Janeen Harry    PLAN:  (Client Tasks / Therapist Tasks / Other)  See above, continue couples counseling.         Siri Ruff

## 2018-11-09 ENCOUNTER — TELEPHONE (OUTPATIENT)
Dept: FAMILY MEDICINE | Facility: OTHER | Age: 43
End: 2018-11-09

## 2018-11-09 NOTE — TELEPHONE ENCOUNTER
Patient is due for a PHQ-9.  Index start date:8/26/2018  Index end date:12/26/2018    Please call patient. Pt is active on [employer]. I will send a PHQ-9 via [employer] and will postpone encounter. Giovanna Melton CMA ([facility])

## 2018-11-26 ENCOUNTER — TELEPHONE (OUTPATIENT)
Dept: SURGERY | Facility: CLINIC | Age: 43
End: 2018-11-26

## 2018-11-26 NOTE — TELEPHONE ENCOUNTER
"Pt has an appt scheduled for 12/05/2018. I have put \"Give PHQ-9\" in the appt note and will postpone the encounter. Giovanna Melton CMA ([facility])    "

## 2018-11-26 NOTE — TELEPHONE ENCOUNTER
Type of surgery: laparoscopic right inguinal hernia repair with mesh, possible open  Location of surgery: [facility] OR  Date and time of surgery: 12/17  Surgeon: Octavia  Pre-Op Appt Date: 12/5  Post-Op Appt Date: 12/28   Packet sent out: Yes  Pre-cert/Authorization completed:  Not Applicable  Date: NA

## 2018-11-27 ENCOUNTER — OFFICE VISIT (OUTPATIENT)
Dept: PSYCHOLOGY | Facility: CLINIC | Age: 43
End: 2018-11-27
Payer: COMMERCIAL

## 2018-11-27 DIAGNOSIS — F32.2 SEVERE SINGLE CURRENT EPISODE OF MAJOR DEPRESSIVE DISORDER, WITHOUT PSYCHOTIC FEATURES (H): Primary | ICD-10-CM

## 2018-11-27 PROCEDURE — 90846 FAMILY PSYTX W/O PT 50 MIN: CPT | Performed by: MARRIAGE & FAMILY THERAPIST

## 2018-11-27 NOTE — PROGRESS NOTES
Progress Note    Client Name: Dustin Mccoy  Date: 11/27/2018         Service Type: Family without client present      Session Start Time: 9am  Session End Time: 9:50am      Session Length: 50 mins     Session #: 2     Attendees: Spouse / Significant Other    Treatment Plan Last Reviewed: NA 2nd session   PHQ-9 / MAIK-7 : NA client not present     DATA      Progress Since Last Session (Related to Symptoms / Goals / Homework):   Symptoms: No change depression, anxiety, lack of trust    Homework: Partially completed      Episode of Care Goals: Satisfactory progress - CONTEMPLATION (Considering change and yet undecided); Intervened by assessing the negative and positive thinking (ambivalence) about behavior change     Current / Ongoing Stressors and Concerns:   Occupational, marital/relationships, financial     Treatment Objective(s) Addressed in This Session:   Consider ways to build trust, increase communication with wife  Establish rapport     Intervention:   Solution Focused:     Spoke with wife alone today about hx of their relationship, client's hx of infidelity, and temperature read on relationship. Discussed difficulties getting client to agree to work on building her trust back up or spend time. Discussed that if client was unwilling to make changes, and trust is not built back up it would be difficult to build relationship back up.        ASSESSMENT: Current Emotional / Mental Status (status of significant symptoms): per previous visit as client is not present   Risk status (Self / Other harm or suicidal ideation)   Client denies current fears or concerns for personal safety.   Client denies current or recent suicidal ideation or behaviors.   Client denies current or recent homicidal ideation or behaviors.   Client denies current or recent self injurious behavior or ideation.   Client denies other safety concerns.   Client Client reports there has been a change  in risk factors since their last session.  working on marital relationship, and ending extra marital relationships   Client Client reports there has been no change in protective factors since their last session.     A safety and risk management plan has not been developed at this time, however client was given the after-hours number / 911 should there be a change in any of these risk factors.     Appearance:   Appropriate    Eye Contact:   Good    Psychomotor Behavior: Normal    Attitude:   Cooperative  Guarded    Orientation:   All   Speech    Rate / Production: Normal     Volume:  Normal    Mood:    Anxious  Depressed    Affect:    Appropriate    Thought Content:  Clear    Thought Form:  Coherent  Logical    Insight:    Fair      Medication Review:   No current psychiatric medications prescribed    No current outpatient prescriptions on file.     No current facility-administered medications for this visit.         Medication Compliance:   NA     Changes in Health Issues:   None reported     Chemical Use Review:   Substance Use: Chemical use reviewed, no active concerns identified      Tobacco Use: No current tobacco use.       Collateral Reports Completed:   Routed note to Care Team Member(s)- Janeen Harry    PLAN: (Client Tasks / Therapist Tasks / Other)  Consider whether wanting to stay in relationship, and whether trust can be built.         Siri Ruff

## 2018-11-27 NOTE — MR AVS SNAPSHOT
MRN:[id number]                      After Visit Summary   11/27/2018    Dustin Mccoy    MRN: [id number]           Visit Information        Provider Department      11/27/2018 9:00 AM Siri Donovan [facility]      Your next 10 appointments already scheduled     Dec 05, 2018  7:40 AM CST   [facility]    [address]  [facility] [id number] [phone]            Dec 11, 2018  9:00 AM CST   Return Visit with Siri Donovan   [facility]    [address]  [facility] [address]   [phone]            Dec 17, 2018   Procedure with Skylar Dudely MD   [facility] ([facility])    [address] [place] [id number] [phone]           From [address]: Exit at [address] on south side of [place]. Turn right on [address]. Turn left at stoplight on [address]. [facility] will be in view two blocks ahead            Dec 28, 2018  1:15 PM CST   Return Visit with Skylar Dudley MD   [facility]    [address] [id number] [phone]              MyChart Information     [employer] gives you secure access to your electronic health record. If you see a primary care provider, you can also send messages to your care team and make appointments. If you have questions, please call your primary care clinic.  If you do not have a primary care provider, please call [phone] and they will assist you.        Care EveryWhere ID     This is your Care EveryWhere ID. This could be used by other organizations to access your [facility] medical records  [id number]        Equal Access to Services     LOKESH HUSAIN : Eugene Cole, drake parker, bart hopkins,  jose antonio brewster'aan ah. So [facility] [phone].    ATENCIÓN: Si habla español, tiene a larry disposición servicios gratuitos de asistencia lingüística. Llame al [phone].    We comply with applicable federal civil rights laws and [place] laws. We do not discriminate on the basis of race, color, national origin, age, disability, sex, sexual orientation, or gender identity.

## 2018-12-05 ENCOUNTER — OFFICE VISIT (OUTPATIENT)
Dept: FAMILY MEDICINE | Facility: OTHER | Age: 43
End: 2018-12-05
Payer: COMMERCIAL

## 2018-12-05 VITALS
HEIGHT: 71 IN | WEIGHT: 233 LBS | HEART RATE: 84 BPM | RESPIRATION RATE: 20 BRPM | SYSTOLIC BLOOD PRESSURE: 130 MMHG | TEMPERATURE: 97.7 F | OXYGEN SATURATION: 99 % | BODY MASS INDEX: 32.62 KG/M2 | DIASTOLIC BLOOD PRESSURE: 86 MMHG

## 2018-12-05 DIAGNOSIS — Z13.6 CARDIOVASCULAR SCREENING; LDL GOAL LESS THAN 160: ICD-10-CM

## 2018-12-05 DIAGNOSIS — K40.90 RIGHT INGUINAL HERNIA: ICD-10-CM

## 2018-12-05 DIAGNOSIS — Z01.818 PREOP GENERAL PHYSICAL EXAM: Primary | ICD-10-CM

## 2018-12-05 LAB
CHOLEST SERPL-MCNC: 188 MG/DL
HDLC SERPL-MCNC: 38 MG/DL
HGB BLD-MCNC: 16.3 G/DL (ref 13.3–17.7)
LDLC SERPL CALC-MCNC: 119 MG/DL
NONHDLC SERPL-MCNC: 150 MG/DL
TRIGL SERPL-MCNC: 153 MG/DL

## 2018-12-05 PROCEDURE — 99214 OFFICE O/P EST MOD 30 MIN: CPT | Performed by: NURSE PRACTITIONER

## 2018-12-05 PROCEDURE — 85018 HEMOGLOBIN: CPT | Performed by: NURSE PRACTITIONER

## 2018-12-05 PROCEDURE — 36415 COLL VENOUS BLD VENIPUNCTURE: CPT | Performed by: NURSE PRACTITIONER

## 2018-12-05 PROCEDURE — 80061 LIPID PANEL: CPT | Performed by: NURSE PRACTITIONER

## 2018-12-05 ASSESSMENT — PAIN SCALES - GENERAL: PAINLEVEL: MILD PAIN (2)

## 2018-12-05 NOTE — PATIENT INSTRUCTIONS
Don't take any more Ibuprofen, Aleve, Naproxen or Aspirin prior to surgery.  Tylenol and/or prescription pain pills are okay to use if needed.     Labs will be done today.   For normal results, you will receive a letter with the results in about 2 weeks.  If anything is abnormal or unexpected, someone from the clinic will call you.        Before Your Surgery      Call your surgeon if there is any change in your health. This includes signs of a cold or flu (such as a sore throat, runny nose, cough, rash or fever).    Do not smoke, drink alcohol or take over the counter medicine (unless your surgeon or primary care doctor tells you to) for the 24 hours before and after surgery.    If you take prescribed drugs: Follow your doctor s orders about which medicines to take and which to stop until after surgery.    Eating and drinking prior to surgery: follow the instructions from your surgeon    Take a shower or bath the night before surgery. Use the soap your surgeon gave you to gently clean your skin. If you do not have soap from your surgeon, use your regular soap. Do not shave or scrub the surgery site.  Wear clean pajamas and have clean sheets on your bed.

## 2018-12-05 NOTE — MR AVS SNAPSHOT
After Visit Summary   12/5/2018    Dustin Mccoy    MRN: [id number]           Patient Information     Date Of Birth          1975        Visit Information        Provider Department      12/5/2018 7:40 AM Christi Olivera APRN [facility]        Today's Diagnoses     Preop general physical exam    -  1    Right inguinal hernia        CARDIOVASCULAR SCREENING; LDL GOAL LESS THAN 160          Care Instructions      Don't take any more Ibuprofen, Aleve, Naproxen or Aspirin prior to surgery.  Tylenol and/or prescription pain pills are okay to use if needed.     Labs will be done today.   For normal results, you will receive a letter with the results in about 2 weeks.  If anything is abnormal or unexpected, someone from the clinic will call you.        Before Your Surgery      Call your surgeon if there is any change in your health. This includes signs of a cold or flu (such as a sore throat, runny nose, cough, rash or fever).    Do not smoke, drink alcohol or take over the counter medicine (unless your surgeon or primary care doctor tells you to) for the 24 hours before and after surgery.    If you take prescribed drugs: Follow your doctor s orders about which medicines to take and which to stop until after surgery.    Eating and drinking prior to surgery: follow the instructions from your surgeon    Take a shower or bath the night before surgery. Use the soap your surgeon gave you to gently clean your skin. If you do not have soap from your surgeon, use your regular soap. Do not shave or scrub the surgery site.  Wear clean pajamas and have clean sheets on your bed.           Follow-ups after your visit        Follow-up notes from your care team     Return in about 12 days (around 12/17/2018) for Supriya .      Your next 10 appointments already scheduled     Dec 11, 2018  9:00 AM CST   Return Visit with Siri Donovan   [facility] ([facility])     [address]  [facility] [address]   [phone]            Dec 17, 2018   Procedure with Skylar Dudley MD   [facility] ([facility]    [address]  [place] [phone]           From [address]: Exit at [address] on south side of [place]. Turn right on [address]. Turn left at stoplight on [address]. [facility] will be in view two blocks ahead            Dec 28, 2018  1:15 PM CST   Return Visit with Skylar Dudley MD   [facility]    [address] [place] [id number] [phone]              Who to contact     If you have questions or need follow up information about today's clinic visit or your schedule please contact [facility] directly at [phone].  Normal or non-critical lab and imaging results will be communicated to you by [employer], letter or phone within 4 business days after the clinic has received the results. If you do not hear from us within 7 days, please contact the clinic through [employer] or phone. If you have a critical or abnormal lab result, we will notify you by phone as soon as possible.  Submit refill requests through [employer] or call your pharmacy and they will forward the refill request to us. Please allow 3 business days for your refill to be completed.          Additional Information About Your Visit        T[employer] Information     [employer] gives you secure access to your electronic health record. If you see a primary care provider, you can also send messages to your care team and make appointments. If you have questions, please call your primary care clinic.  If you do not have a primary care provider, please call [phone] and they will assist you.        Care EveryWhere ID     This is your Care EveryWhere ID. This could be used by other organizations to access your [facility] medical records  [id number]       "  Your Vitals Were     Pulse Temperature Respirations Height Pulse Oximetry BMI (Body Mass Index)    84 97.7  F (36.5  C) (Temporal) 20 5' 10.87\" (1.8 m) 99% 32.62 kg/m2       Blood Pressure from Last 3 Encounters:   12/05/18 130/86   10/09/18 136/90   09/28/18 138/86    Weight from Last 3 Encounters:   12/05/18 233 lb (105.7 kg)   10/09/18 234 lb 4.8 oz (106.3 kg)   09/28/18 231 lb (104.8 kg)              We Performed the Following     Hemoglobin     Lipid panel reflex to direct LDL Fasting        Primary Care Provider Fax #    Physician No Ref-Primary [phone]       No address on file        Equal Access to Services     LOKESH HUSAIN : Eugene Cole, wakeila luqadaha, qaybta kaalmada kellie, jose antonio velasquez. So [facility] [phone].    ATENCIÓN: Si habla español, tiene a larry disposición servicios gratuitos de asistencia lingüística. Llame al [phone].    We comply with applicable federal civil rights laws and [place] laws. We do not discriminate on the basis of race, color, national origin, age, disability, sex, sexual orientation, or gender identity.            Thank you!     Thank you for choosing [facility]  for your care. Our goal is always to provide you with excellent care. Hearing back from our patients is one way we can continue to improve our services. Please take a few minutes to complete the written survey that you may receive in the mail after your visit with us. Thank you!             Your Updated Medication List - Protect others around you: Learn how to safely use, store and throw away your medicines at www.disposemymeds.org.      Notice  As of 12/5/2018  8:06 AM    You have not been prescribed any medications.      "

## 2018-12-05 NOTE — PROGRESS NOTES
[facility]  [address] [facility] [address]  [phone]  Dept: [phone]    PRE-OP EVALUATION:  Today's date: 2018    Dustin Mccoy (: 1975) presents for pre-operative evaluation assessment as requested by Dr. Dudley.  He requires evaluation and anesthesia risk assessment prior to undergoing surgery/procedure for treatment of hernia repair .    Fax number for surgical facility: [facility]  Primary Physician: No Ref-Primary, Physician  Type of Anesthesia Anticipated: General    Patient has a Health Care Directive or Living Will:  NO    Preop Questions 2018   Who is doing your surgery? dr yepez   What are you having done? hernia   Date of Surgery/Procedure: 18   Facility or Hospital where procedure/surgery will be performed: [facility]     1.  Do you have a history of Heart attack, stroke, stent, coronary bypass surgery, or other heart surgery? No   2.  Do you ever have any pain or discomfort in your chest? No   3.  Do you have a history of  Heart Failure? No   4.   Are you troubled by shortness of breath when:  walking on a level surface, or up a slight hill, or at night? No   5.  Do you currently have a cold, bronchitis or other respiratory infection? YES - started yesterday    6.  Do you have a cough, shortness of breath, or wheezing? No   7.  Do you sometimes get pains in the calves of your legs when you walk? No   8. Do you or anyone in your family have previous history of blood clots? No   9.  Do you or does anyone in your family have a serious bleeding problem such as prolonged bleeding following surgeries or cuts? No   10. Have you ever had problems with anemia or been told to take iron pills? No   11. Have you had any abnormal blood loss such as black, tarry or bloody stools? No   12. Have you ever had a blood transfusion? No   13. Have you or any of your relatives ever had problems with anesthesia? No   14. Do you have sleep apnea, excessive snoring or  daytime drowsiness? No   15. Do you have any prosthetic heart valves? No   16. Do you have prosthetic joints? No         HPI:     HPI related to upcoming procedure: right inguinal hernia       See problem list for active medical problems.  Problems all longstanding and stable, except as noted/documented.  See ROS for pertinent symptoms related to these conditions.                                                                                                                                                          .    MEDICAL HISTORY:     Patient Active Problem List    Diagnosis Date Noted     Severe single current episode of major depressive disorder, without psychotic features (H) 05/04/2018     Priority: Medium     Adjustment disorder with anxious mood 05/04/2018     Priority: Medium     Contusion of right hand, subsequent encounter 11/29/2017     Priority: Medium     Olecranon bursitis of left elbow 01/02/2017     Priority: Medium     Cellulitis of left upper extremity 01/02/2017     Priority: Medium     Cellulitis of left elbow 01/02/2017     Priority: Medium     H/O irritable bowel syndrome 12/28/2015     Priority: Medium     Atypical chest pain 12/28/2015     Priority: Medium      Past Medical History:   Diagnosis Date     Atypical chest pain 12/28/2015     H/O irritable bowel syndrome 12/28/2015     Past Surgical History:   Procedure Laterality Date     NO HISTORY OF SURGERY       No current outpatient prescriptions on file.     OTC products: none    Allergies   Allergen Reactions     Bactrim [Sulfamethoxazole W/Trimethoprim]      Erythromycin      Sulfa Drugs       Latex Allergy: NO    Social History   Substance Use Topics     Smoking status: Never Smoker     Smokeless tobacco: Never Used     Alcohol use Yes      Comment: occasional     History   Drug Use No       REVIEW OF SYSTEMS:   CONSTITUTIONAL: NEGATIVE for fever, chills, change in weight  INTEGUMENTARY/SKIN: NEGATIVE for worrisome rashes, moles or  "lesions  EYES: NEGATIVE for vision changes or irritation  ENT/MOUTH: NEGATIVE for ear, mouth and throat problems  RESP: NEGATIVE for significant cough or SOB  BREAST: NEGATIVE for masses, tenderness or discharge  CV: NEGATIVE for chest pain, palpitations or peripheral edema  GI: NEGATIVE for nausea, abdominal pain, heartburn, or change in bowel habits  : NEGATIVE for frequency, dysuria, or hematuria  MUSCULOSKELETAL: NEGATIVE for significant arthralgias or myalgia  NEURO: NEGATIVE for weakness, dizziness or paresthesias  ENDOCRINE: NEGATIVE for temperature intolerance, skin/hair changes  HEME: NEGATIVE for bleeding problems  PSYCHIATRIC: NEGATIVE for changes in mood or affect    EXAM:   /86  Pulse 84  Temp 97.7  F (36.5  C) (Temporal)  Resp 20  Ht 5' 10.87\" (1.8 m)  Wt 233 lb (105.7 kg)  SpO2 99%  BMI 32.62 kg/m2    GENERAL APPEARANCE: healthy, alert and no distress     EYES: EOMI,  PERRL     HENT: ear canals and TM's normal and nose and mouth without ulcers or lesions     NECK: no adenopathy, no asymmetry, masses, or scars and thyroid normal to palpation     RESP: lungs clear to auscultation - no rales, rhonchi or wheezes     CV: regular rates and rhythm, normal S1 S2, no S3 or S4 and no murmur, click or rub     ABDOMEN:  soft, nontender, no HSM or masses and bowel sounds normal     MS: extremities normal- no gross deformities noted, no evidence of inflammation in joints, FROM in all extremities.     SKIN: no suspicious lesions or rashes     NEURO: Normal strength and tone, sensory exam grossly normal, mentation intact and speech normal     PSYCH: mentation appears normal. and affect normal/bright     LYMPHATICS: No cervical adenopathy    DIAGNOSTICS:     Office Visit on 12/05/2018   Component Date Value Ref Range Status     Hemoglobin 12/05/2018 16.3  13.3 - 17.7 g/dL Final           Recent Labs   Lab Test  01/03/17   0521  01/02/17   1710   HGB  13.2*   --    PLT  209   --    NA  143  141 "   POTASSIUM  3.8  3.6   CR  0.91  0.85        IMPRESSION:   Reason for surgery/procedure: right inguinal hernia     The proposed surgical procedure is considered INTERMEDIATE risk.    REVISED CARDIAC RISK INDEX  The patient has the following serious cardiovascular risks for perioperative complications such as (MI, PE, VFib and 3  AV Block):  No serious cardiac risks  INTERPRETATION: 0 risks: Class I (very low risk - 0.4% complication rate)    The patient has the following additional risks for perioperative complications:  No identified additional risks      ICD-10-CM    1. Preop general physical exam Z01.818 Hemoglobin   2. Right inguinal hernia K40.90    3. CARDIOVASCULAR SCREENING; LDL GOAL LESS THAN 160 Z13.6 Lipid panel reflex to direct LDL Fasting       RECOMMENDATIONS:         --Patient is on no chronic medications    APPROVAL GIVEN to proceed with proposed procedure, without further diagnostic evaluation       Signed Electronically by: THANH Burton CNP    Copy of this evaluation report is provided to requesting physician.    [facility] Preop Guidelines    Revised Cardiac Risk Index

## 2018-12-11 ENCOUNTER — OFFICE VISIT (OUTPATIENT)
Dept: PSYCHOLOGY | Facility: CLINIC | Age: 43
End: 2018-12-11
Payer: COMMERCIAL

## 2018-12-11 DIAGNOSIS — F32.2 SEVERE SINGLE CURRENT EPISODE OF MAJOR DEPRESSIVE DISORDER, WITHOUT PSYCHOTIC FEATURES (H): Primary | ICD-10-CM

## 2018-12-11 PROCEDURE — 90847 FAMILY PSYTX W/PT 50 MIN: CPT | Performed by: MARRIAGE & FAMILY THERAPIST

## 2018-12-11 NOTE — PROGRESS NOTES
Progress Note    Client Name: Dustin Mccoy  Date: 12/11/2018         Service Type: Family with client present      Session Start Time: 9:05am  Session End Time: 9:55am      Session Length: 50 mins     Session #: 3     Attendees: Spouse / Significant Other    Treatment Plan Last Reviewed: completed today   PHQ-9 / MAIK-7 : declined     DATA      Progress Since Last Session (Related to Symptoms / Goals / Homework):   Symptoms: Improving increased communication, still struggling with trust issues     Homework: Partially completed      Episode of Care Goals: Satisfactory progress - PREPARATION (Decided to change - considering how); Intervened by negotiating a change plan and determining options / strategies for behavior change, identifying triggers, exploring social supports, and working towards setting a date to begin behavior change     Current / Ongoing Stressors and Concerns:   Occupational, marital/relationships, financial     Treatment Objective(s) Addressed in This Session:   Consider ways to build trust, increase communication with wife  ID 1-2 stressors contributing to marital conflict     Intervention:   Solution Focused:     Couple has decided to work on their relationship. Discussed ways to build up trust: consider making changes with phones, considering no electronic days or times in which they put away electronics and spend quality time, combine bank accounts for transparency and to reduce financial stress. Discussed considering seeing a  to reduce debit and create a functional budget. Client reports he will be having hernia repair next week and is concerned with how all his present house chores will be managed. Discussed a plan to have a family meeting to discuss how all family members can work better together as a team so client can heal.      ASSESSMENT: Current Emotional / Mental Status (status of significant symptoms):   Risk status  (Self / Other harm or suicidal ideation)   Client denies current fears or concerns for personal safety.   Client denies current or recent suicidal ideation or behaviors.   Client denies current or recent homicidal ideation or behaviors.   Client denies current or recent self injurious behavior or ideation.   Client denies other safety concerns.   Client Client reports there has been a change in risk factors since their last session.  working on marital relationship, and ending extra marital relationships   Client Client reports there has been no change in protective factors since their last session.     A safety and risk management plan has not been developed at this time, however client was given the after-hours number / 911 should there be a change in any of these risk factors.     Appearance:   Appropriate    Eye Contact:   Good    Psychomotor Behavior: Normal    Attitude:   Cooperative    Orientation:   All   Speech    Rate / Production: Normal     Volume:  Normal    Mood:    Anxious  Depressed    Affect:    Appropriate    Thought Content:  Clear    Thought Form:  Coherent  Logical    Insight:    Fair      Medication Review:   No current psychiatric medications prescribed    No current outpatient medications on file.     No current facility-administered medications for this visit.         Medication Compliance:   NA     Changes in Health Issues:   None reported     Chemical Use Review:   Substance Use: Chemical use reviewed, no active concerns identified      Tobacco Use: No current tobacco use.       Collateral Reports Completed:   Routed note to Care Team Member(s)- Janeen Harry    PLAN: (Client Tasks / Therapist Tasks / Other)  Consider how to build up trust and relationship. Consider seeing .          Siri Ruff                                                      Treatment Plan    Client's Name: Dustin Mccoy  YOB: 1975    Date: 12/11/2018      DSM-V Diagnoses:  Diagnoses:  296.23 (F32.2) Major Depressive Disorder, Single Episode, Severe With anxious distress  Psychosocial & Contextual Factors: marital distress, financial  WHODAS Score: 18    Referral / Collaboration:    Collaboration was initiated with PCP MD    Anticipated number of session or this episode of care: 8-16    MeasurableTreatment Goal(s) related to diagnosis / functional impairment(s)  Goal 1: Client will decrease depressed mood and anxiety as evidenced by PHQ9 under 5, and GAD7 under 5 in the next 6-9 months. 8/23/2018: PHQ9: 20, GAD7: 15    I will know I've met my goal when I feeling happier in my marriage, we get along and communicate, and we trust each other.      Objective #A (Client Action)    Client will identify 1-2 current life stresses that contribute to depressed mood and work towards eliminating stressors or reducing reactivity to stressors. Client will develop coping skills and learn anxiety reduction skills to manage anxiety and depressed mood.   Status: Continued - Date(s): 12/11/2018    Intervention(s)  Therapist will teach client how to ID stressors and decrease or eliminate reactivity to stressors. Teach coping skills and anxiety reduction techniques    Objective #B  Client will client will address relationship difficulties in a more adaptive manner and will learn strategies to resolve conflict adaptively and will learn and practice positive communication skills . Client will consider whether he would like to remain in present marriage.   Status: Continued - Date(s): 12/11/2018    Intervention(s)  Therapist will teach conflict resolution skills, communication skills, how to ID bodily cues anxiety triggers, anxiety reduction techniques      Client and family has reviewed and agreed to the above plan.      Siri Ruff  December 11, 2018

## 2018-12-12 ENCOUNTER — TELEPHONE (OUTPATIENT)
Dept: FAMILY MEDICINE | Facility: OTHER | Age: 43
End: 2018-12-12

## 2018-12-12 NOTE — TELEPHONE ENCOUNTER
Reason for Call:  Form, our goal is to have forms completed with 72 hours, however, some forms may require a visit or additional information.    Type of letter, form or note:  FMLA    Who is the form from?: Patient    Where did the form come from: Patient or family brought in       What clinic location was the form placed at?: [place]    Where the form was placed: 's Box    What number is listed as a contact on the form?: [phone]       Additional comments: Patient left note on the form stating he would probably need the night of the 16th off as well.  The surgery is on the 17th.  Please call with questions.    Call taken on 12/12/2018 at 9:12 AM by Chetan Garza

## 2018-12-17 ENCOUNTER — TELEPHONE (OUTPATIENT)
Dept: SURGERY | Facility: CLINIC | Age: 43
End: 2018-12-17

## 2018-12-17 ENCOUNTER — HOSPITAL ENCOUNTER (OUTPATIENT)
Facility: CLINIC | Age: 43
Discharge: HOME | End: 2018-12-17
Attending: SURGERY | Admitting: SURGERY
Payer: COMMERCIAL

## 2018-12-17 ENCOUNTER — ANESTHESIA EVENT (OUTPATIENT)
Dept: SURGERY | Facility: CLINIC | Age: 43
End: 2018-12-17
Payer: COMMERCIAL

## 2018-12-17 ENCOUNTER — ANESTHESIA (OUTPATIENT)
Dept: SURGERY | Facility: CLINIC | Age: 43
End: 2018-12-17
Payer: COMMERCIAL

## 2018-12-17 VITALS
SYSTOLIC BLOOD PRESSURE: 114 MMHG | TEMPERATURE: 97.7 F | BODY MASS INDEX: 32.62 KG/M2 | RESPIRATION RATE: 14 BRPM | DIASTOLIC BLOOD PRESSURE: 79 MMHG | WEIGHT: 233 LBS | HEIGHT: 71 IN | OXYGEN SATURATION: 97 %

## 2018-12-17 DIAGNOSIS — Z87.19 S/P RIGHT INGUINAL HERNIA REPAIR: Primary | ICD-10-CM

## 2018-12-17 DIAGNOSIS — Z98.890 S/P RIGHT INGUINAL HERNIA REPAIR: Primary | ICD-10-CM

## 2018-12-17 PROCEDURE — C1781 MESH (IMPLANTABLE): HCPCS | Performed by: SURGERY

## 2018-12-17 PROCEDURE — 25000125 ZZHC RX 250: Performed by: SURGERY

## 2018-12-17 PROCEDURE — 27211024 ZZHC OR SUPPLY OTHER OPNP: Performed by: SURGERY

## 2018-12-17 PROCEDURE — 27210794 ZZH OR GENERAL SUPPLY STERILE: Performed by: SURGERY

## 2018-12-17 PROCEDURE — 71000027 ZZH RECOVERY PHASE 2 EACH 15 MINS: Performed by: SURGERY

## 2018-12-17 PROCEDURE — 88304 TISSUE EXAM BY PATHOLOGIST: CPT | Performed by: SURGERY

## 2018-12-17 PROCEDURE — 40000306 ZZH STATISTIC PRE PROC ASSESS II: Performed by: SURGERY

## 2018-12-17 PROCEDURE — 37000008 ZZH ANESTHESIA TECHNICAL FEE, 1ST 30 MIN: Performed by: SURGERY

## 2018-12-17 PROCEDURE — 49650 LAP ING HERNIA REPAIR INIT: CPT | Mod: RT | Performed by: SURGERY

## 2018-12-17 PROCEDURE — 37000009 ZZH ANESTHESIA TECHNICAL FEE, EACH ADDTL 15 MIN: Performed by: SURGERY

## 2018-12-17 PROCEDURE — 88304 TISSUE EXAM BY PATHOLOGIST: CPT | Mod: 26 | Performed by: SURGERY

## 2018-12-17 PROCEDURE — 71000014 ZZH RECOVERY PHASE 1 LEVEL 2 FIRST HR: Performed by: SURGERY

## 2018-12-17 PROCEDURE — 25000132 ZZH RX MED GY IP 250 OP 250 PS 637: Performed by: SURGERY

## 2018-12-17 PROCEDURE — 25000566 ZZH SEVOFLURANE, EA 15 MIN: Performed by: SURGERY

## 2018-12-17 PROCEDURE — 36000056 ZZH SURGERY LEVEL 3 1ST 30 MIN: Performed by: SURGERY

## 2018-12-17 PROCEDURE — 25000125 ZZHC RX 250: Performed by: NURSE ANESTHETIST, CERTIFIED REGISTERED

## 2018-12-17 PROCEDURE — 36000058 ZZH SURGERY LEVEL 3 EA 15 ADDTL MIN: Performed by: SURGERY

## 2018-12-17 PROCEDURE — 25000128 H RX IP 250 OP 636: Performed by: NURSE ANESTHETIST, CERTIFIED REGISTERED

## 2018-12-17 PROCEDURE — 25000128 H RX IP 250 OP 636: Performed by: SURGERY

## 2018-12-17 DEVICE — IMPLANTABLE DEVICE: Type: IMPLANTABLE DEVICE | Site: INGUINAL | Status: FUNCTIONAL

## 2018-12-17 RX ORDER — ONDANSETRON 2 MG/ML
4 INJECTION INTRAMUSCULAR; INTRAVENOUS EVERY 30 MIN PRN
Status: DISCONTINUED | OUTPATIENT
Start: 2018-12-17 | End: 2018-12-17 | Stop reason: HOSPADM

## 2018-12-17 RX ORDER — MEPERIDINE HYDROCHLORIDE 25 MG/ML
12.5 INJECTION INTRAMUSCULAR; INTRAVENOUS; SUBCUTANEOUS
Status: DISCONTINUED | OUTPATIENT
Start: 2018-12-17 | End: 2018-12-17 | Stop reason: HOSPADM

## 2018-12-17 RX ORDER — DEXAMETHASONE SODIUM PHOSPHATE 10 MG/ML
4 INJECTION INTRAMUSCULAR; INTRAVENOUS EVERY 10 MIN PRN
Status: DISCONTINUED | OUTPATIENT
Start: 2018-12-17 | End: 2018-12-17 | Stop reason: HOSPADM

## 2018-12-17 RX ORDER — HYDROCODONE BITARTRATE AND ACETAMINOPHEN 5; 325 MG/1; MG/1
1 TABLET ORAL EVERY 6 HOURS PRN
Qty: 25 TABLET | Refills: 0 | Status: SHIPPED | OUTPATIENT
Start: 2018-12-17 | End: 2019-05-29

## 2018-12-17 RX ORDER — HEPARIN SODIUM 5000 [USP'U]/ML
5000 INJECTION, SOLUTION INTRAVENOUS; SUBCUTANEOUS
Status: COMPLETED | OUTPATIENT
Start: 2018-12-17 | End: 2018-12-17

## 2018-12-17 RX ORDER — PROPOFOL 10 MG/ML
INJECTION, EMULSION INTRAVENOUS PRN
Status: DISCONTINUED | OUTPATIENT
Start: 2018-12-17 | End: 2018-12-17

## 2018-12-17 RX ORDER — FENTANYL CITRATE 50 UG/ML
INJECTION, SOLUTION INTRAMUSCULAR; INTRAVENOUS PRN
Status: DISCONTINUED | OUTPATIENT
Start: 2018-12-17 | End: 2018-12-17

## 2018-12-17 RX ORDER — HYDROCODONE BITARTRATE AND ACETAMINOPHEN 5; 325 MG/1; MG/1
2 TABLET ORAL
Status: COMPLETED | OUTPATIENT
Start: 2018-12-17 | End: 2018-12-17

## 2018-12-17 RX ORDER — CEFAZOLIN SODIUM 1 G/3ML
1 INJECTION, POWDER, FOR SOLUTION INTRAMUSCULAR; INTRAVENOUS SEE ADMIN INSTRUCTIONS
Status: DISCONTINUED | OUTPATIENT
Start: 2018-12-17 | End: 2018-12-17 | Stop reason: HOSPADM

## 2018-12-17 RX ORDER — ALBUTEROL SULFATE 0.83 MG/ML
2.5 SOLUTION RESPIRATORY (INHALATION) EVERY 4 HOURS PRN
Status: DISCONTINUED | OUTPATIENT
Start: 2018-12-17 | End: 2018-12-17 | Stop reason: HOSPADM

## 2018-12-17 RX ORDER — ONDANSETRON 2 MG/ML
INJECTION INTRAMUSCULAR; INTRAVENOUS PRN
Status: DISCONTINUED | OUTPATIENT
Start: 2018-12-17 | End: 2018-12-17

## 2018-12-17 RX ORDER — NALOXONE HYDROCHLORIDE 0.4 MG/ML
.1-.4 INJECTION, SOLUTION INTRAMUSCULAR; INTRAVENOUS; SUBCUTANEOUS
Status: DISCONTINUED | OUTPATIENT
Start: 2018-12-17 | End: 2018-12-17 | Stop reason: HOSPADM

## 2018-12-17 RX ORDER — FENTANYL CITRATE 50 UG/ML
25-50 INJECTION, SOLUTION INTRAMUSCULAR; INTRAVENOUS
Status: DISCONTINUED | OUTPATIENT
Start: 2018-12-17 | End: 2018-12-17 | Stop reason: HOSPADM

## 2018-12-17 RX ORDER — HEPARIN SODIUM 5000 [USP'U]/.5ML
5000 INJECTION, SOLUTION INTRAVENOUS; SUBCUTANEOUS
Status: DISCONTINUED | OUTPATIENT
Start: 2018-12-17 | End: 2018-12-17 | Stop reason: CLARIF

## 2018-12-17 RX ORDER — BUPIVACAINE HYDROCHLORIDE AND EPINEPHRINE 5; 5 MG/ML; UG/ML
INJECTION, SOLUTION PERINEURAL PRN
Status: DISCONTINUED | OUTPATIENT
Start: 2018-12-17 | End: 2018-12-17 | Stop reason: HOSPADM

## 2018-12-17 RX ORDER — LIDOCAINE HYDROCHLORIDE 20 MG/ML
INJECTION, SOLUTION INFILTRATION; PERINEURAL PRN
Status: DISCONTINUED | OUTPATIENT
Start: 2018-12-17 | End: 2018-12-17

## 2018-12-17 RX ORDER — CEFAZOLIN SODIUM 2 G/100ML
2 INJECTION, SOLUTION INTRAVENOUS
Status: COMPLETED | OUTPATIENT
Start: 2018-12-17 | End: 2018-12-17

## 2018-12-17 RX ORDER — HYDROMORPHONE HYDROCHLORIDE 1 MG/ML
.3-.5 INJECTION, SOLUTION INTRAMUSCULAR; INTRAVENOUS; SUBCUTANEOUS EVERY 10 MIN PRN
Status: DISCONTINUED | OUTPATIENT
Start: 2018-12-17 | End: 2018-12-17 | Stop reason: HOSPADM

## 2018-12-17 RX ORDER — DEXAMETHASONE SODIUM PHOSPHATE 10 MG/ML
INJECTION, SOLUTION INTRAMUSCULAR; INTRAVENOUS PRN
Status: DISCONTINUED | OUTPATIENT
Start: 2018-12-17 | End: 2018-12-17

## 2018-12-17 RX ORDER — SODIUM CHLORIDE, SODIUM LACTATE, POTASSIUM CHLORIDE, CALCIUM CHLORIDE 600; 310; 30; 20 MG/100ML; MG/100ML; MG/100ML; MG/100ML
INJECTION, SOLUTION INTRAVENOUS CONTINUOUS
Status: DISCONTINUED | OUTPATIENT
Start: 2018-12-17 | End: 2018-12-17 | Stop reason: HOSPADM

## 2018-12-17 RX ORDER — OXYCODONE HYDROCHLORIDE 5 MG/1
10 TABLET ORAL EVERY 4 HOURS PRN
Status: DISCONTINUED | OUTPATIENT
Start: 2018-12-17 | End: 2018-12-17 | Stop reason: HOSPADM

## 2018-12-17 RX ORDER — ONDANSETRON 4 MG/1
4 TABLET, ORALLY DISINTEGRATING ORAL EVERY 30 MIN PRN
Status: DISCONTINUED | OUTPATIENT
Start: 2018-12-17 | End: 2018-12-17 | Stop reason: HOSPADM

## 2018-12-17 RX ORDER — BUPIVACAINE HYDROCHLORIDE 5 MG/ML
INJECTION, SOLUTION PERINEURAL PRN
Status: DISCONTINUED | OUTPATIENT
Start: 2018-12-17 | End: 2018-12-17 | Stop reason: HOSPADM

## 2018-12-17 RX ADMIN — SODIUM CHLORIDE, POTASSIUM CHLORIDE, SODIUM LACTATE AND CALCIUM CHLORIDE: 600; 310; 30; 20 INJECTION, SOLUTION INTRAVENOUS at 12:27

## 2018-12-17 RX ADMIN — SODIUM CHLORIDE, POTASSIUM CHLORIDE, SODIUM LACTATE AND CALCIUM CHLORIDE: 600; 310; 30; 20 INJECTION, SOLUTION INTRAVENOUS at 14:01

## 2018-12-17 RX ADMIN — ROCURONIUM BROMIDE 10 MG: 10 INJECTION INTRAVENOUS at 15:09

## 2018-12-17 RX ADMIN — MIDAZOLAM 2 MG: 1 INJECTION INTRAMUSCULAR; INTRAVENOUS at 13:28

## 2018-12-17 RX ADMIN — ONDANSETRON 4 MG: 2 INJECTION INTRAMUSCULAR; INTRAVENOUS at 13:45

## 2018-12-17 RX ADMIN — ROCURONIUM BROMIDE 50 MG: 10 INJECTION INTRAVENOUS at 13:39

## 2018-12-17 RX ADMIN — LIDOCAINE HYDROCHLORIDE 100 MG: 20 INJECTION, SOLUTION INFILTRATION; PERINEURAL at 13:39

## 2018-12-17 RX ADMIN — LIDOCAINE HYDROCHLORIDE 1 ML: 10 INJECTION, SOLUTION EPIDURAL; INFILTRATION; INTRACAUDAL; PERINEURAL at 12:27

## 2018-12-17 RX ADMIN — FENTANYL CITRATE 50 MCG: 50 INJECTION INTRAMUSCULAR; INTRAVENOUS at 17:07

## 2018-12-17 RX ADMIN — CEFAZOLIN SODIUM 2 G: 2 INJECTION, SOLUTION INTRAVENOUS at 13:45

## 2018-12-17 RX ADMIN — FENTANYL CITRATE 50 MCG: 50 INJECTION, SOLUTION INTRAMUSCULAR; INTRAVENOUS at 15:09

## 2018-12-17 RX ADMIN — HYDROCODONE BITARTRATE AND ACETAMINOPHEN 2 TABLET: 5; 325 TABLET ORAL at 17:46

## 2018-12-17 RX ADMIN — SUGAMMADEX 200 MG: 100 INJECTION, SOLUTION INTRAVENOUS at 16:08

## 2018-12-17 RX ADMIN — PROPOFOL 200 MG: 10 INJECTION, EMULSION INTRAVENOUS at 13:39

## 2018-12-17 RX ADMIN — ROCURONIUM BROMIDE 5 MG: 10 INJECTION INTRAVENOUS at 15:45

## 2018-12-17 RX ADMIN — HEPARIN SODIUM 5000 UNITS: 5000 INJECTION, SOLUTION INTRAVENOUS; SUBCUTANEOUS at 13:48

## 2018-12-17 RX ADMIN — DEXAMETHASONE SODIUM PHOSPHATE 10 MG: 10 INJECTION, SOLUTION INTRAMUSCULAR; INTRAVENOUS at 13:45

## 2018-12-17 RX ADMIN — CEFAZOLIN 1 G: 1 INJECTION, POWDER, FOR SOLUTION INTRAMUSCULAR; INTRAVENOUS at 15:45

## 2018-12-17 RX ADMIN — HYDROMORPHONE HYDROCHLORIDE 0.5 MG: 1 INJECTION, SOLUTION INTRAMUSCULAR; INTRAVENOUS; SUBCUTANEOUS at 16:06

## 2018-12-17 RX ADMIN — SODIUM CHLORIDE, POTASSIUM CHLORIDE, SODIUM LACTATE AND CALCIUM CHLORIDE: 600; 310; 30; 20 INJECTION, SOLUTION INTRAVENOUS at 15:54

## 2018-12-17 RX ADMIN — FENTANYL CITRATE 50 MCG: 50 INJECTION, SOLUTION INTRAMUSCULAR; INTRAVENOUS at 13:32

## 2018-12-17 ASSESSMENT — MIFFLIN-ST. JEOR: SCORE: 1961.94

## 2018-12-17 NOTE — ANESTHESIA POSTPROCEDURE EVALUATION
Patient: Dustin Mccoy    Procedure(s):  Laparoscopic Right Inguinal Hernia Repair    Diagnosis:symptomatic right inguinal hernia   Diagnosis Additional Information: No value filed.    Anesthesia Type:  General, ETT    Note:  Anesthesia Post Evaluation    Patient location during evaluation: PACU  Patient participation: Able to fully participate in evaluation  Level of consciousness: awake  Pain management: adequate  Airway patency: patent  Cardiovascular status: acceptable and hemodynamically stable  Respiratory status: acceptable, room air and nonlabored ventilation  Hydration status: stable  PONV: none     Anesthetic complications: None    Comments: Patient was happy with the anesthesia care received and no anesthesia related complications were noted.  I will follow up with the patient again if it is needed.        Last vitals:  Vitals:    12/17/18 1650 12/17/18 1655 12/17/18 1700   BP: 136/74 140/85    Resp: 12 8    Temp: 96.6  F (35.9  C) 96.6  F (35.9  C)    SpO2: 92% 96% 96%         Electronically Signed By: THANH Fabian CRNA  December 17, 2018  5:06 PM

## 2018-12-17 NOTE — ANESTHESIA PREPROCEDURE EVALUATION
Anesthesia Pre-Procedure Evaluation    Patient: Dustin Mccoy   MRN: [id number] : 1975          Preoperative Diagnosis: symptomatic right inguinal hernia     Procedure(s):  LAPAROSCOPIC HERNIORRHAPHY INGUINAL RIGHT repair with mesh,    Past Medical History:   Diagnosis Date     Atypical chest pain 2015     H/O irritable bowel syndrome 2015     Past Surgical History:   Procedure Laterality Date     NO HISTORY OF SURGERY         Anesthesia Evaluation     .             ROS/MED HX    ENT/Pulmonary:     (+), recent URI Start of a cold on 18: . .    Neurologic:  - neg neurologic ROS     Cardiovascular: Comment: Atypical chest pain,     (+) ----. : . . . :. . Previous cardiac testing Echodate:12/28/15results:EF 55-60%date: results:ECG reviewed date:12/28/15 results:NSR date: results:          METS/Exercise Tolerance:     Hematologic:  - neg hematologic  ROS       Musculoskeletal:  - neg musculoskeletal ROS       GI/Hepatic: Comment: Irritable bowel syndrome         Renal/Genitourinary:  - ROS Renal section negative       Endo:  - neg endo ROS       Psychiatric:     (+) psychiatric history anxiety and depression      Infectious Disease:  - neg infectious disease ROS       Malignancy:      - no malignancy   Other:    - neg other ROS                      Physical Exam  Normal systems: cardiovascular, pulmonary and dental    Airway   Mallampati: II  TM distance: >3 FB  Neck ROM: full    Dental     Cardiovascular   Rhythm and rate: regular and normal      Pulmonary    breath sounds clear to auscultation            Lab Results   Component Value Date    WBC 7.6 2017    HGB 16.3 2018    HCT 39.4 (L) 2017     2017    CRP 32.5 (H) 2017     2017    POTASSIUM 3.8 2017    CHLORIDE 109 2017    CO2 25 2017    BUN 12 2017    CR 0.91 2017    GLC 84 2017    SELVIN 7.8 (L) 2017       Preop Vitals  BP Readings from Last 3  "Encounters:   12/05/18 130/86   10/09/18 136/90   09/28/18 138/86    Pulse Readings from Last 3 Encounters:   12/05/18 84   10/09/18 84   09/28/18 82      Resp Readings from Last 3 Encounters:   12/05/18 20   09/28/18 20   05/31/18 16    SpO2 Readings from Last 3 Encounters:   12/05/18 99%   10/09/18 99%   09/28/18 99%      Temp Readings from Last 1 Encounters:   12/05/18 97.7  F (36.5  C) (Temporal)    Ht Readings from Last 1 Encounters:   12/05/18 1.8 m (5' 10.87\")      Wt Readings from Last 1 Encounters:   12/05/18 105.7 kg (233 lb)    Estimated body mass index is 32.62 kg/m  as calculated from the following:    Height as of 12/5/18: 1.8 m (5' 10.87\").    Weight as of 12/5/18: 105.7 kg (233 lb).       Anesthesia Plan      History & Physical Review  History and physical reviewed and following examination; no interval change.    ASA Status:  2 .    NPO Status:  > 6 hours    Plan for General and ETT with Intravenous and Propofol induction. Maintenance will be Balanced.    PONV prophylaxis:  Ondansetron (or other 5HT-3) and Dexamethasone or Solumedrol       Postoperative Care      Consents  Anesthetic plan, risks, benefits and alternatives discussed with:  Patient.  Use of blood products discussed: No .   .                 THANH Fabian CRNA  "

## 2018-12-17 NOTE — ANESTHESIA CARE TRANSFER NOTE
Patient: Dustin Mccoy    Procedure(s):  Laparoscopic Right Inguinal Hernia Repair    Diagnosis: symptomatic right inguinal hernia   Diagnosis Additional Information: No value filed.    Anesthesia Type:   General, ETT     Note:  Airway :Nasal Cannula  Patient transferred to:PACU  Handoff Report: Identifed the Patient, Identified the Reponsible Provider, Reviewed the pertinent medical history, Discussed the surgical course, Reviewed Intra-OP anesthesia mangement and issues during anesthesia, Set expectations for post-procedure period and Allowed opportunity for questions and acknowledgement of understanding      Vitals: (Last set prior to Anesthesia Care Transfer)    CRNA VITALS  12/17/2018 1556 - 12/17/2018 1632      12/17/2018             SpO2:  100 %    Resp Rate (observed):  21                Electronically Signed By: THANH Allen CRNA  December 17, 2018  4:32 PM

## 2018-12-17 NOTE — DISCHARGE INSTRUCTIONS
[facility]    Home Care Following Hernia Repair (Open or Laparoscopic)    Dr. Dudley    Hernia Type:  Inguinal    Care of the Incision:    Remove gauze dressing (if present) after 24 hours and then it is ok to shower.     If surgical glue was used, keep your incision dry for 24 hours.  Then you may shower, but don t submerge under water for at least 2 weeks.  Gently pat your incision dry with a freshly laundered towel.    Do not touch your incision with bare hands or pick at scabs.    Leave your incision open to air.  Cover it only if clothing rubs or irritates it.  Activity:    Gradually increase your activity.  Walk short distances several times each day and increase the distance as your strength allows.    To promote circulation, do not cross your legs while sitting.    No strenuous lifting or straining for 2 weeks.   Do not lift anything over 20 pounds for 2 weeks.    Return to work will be determined by the type of work you do and should be discussed with your physician.    Do not drive or operate equipment while taking prescription pain medicines.  You may drive 1 week after surgery if you have stopped taking prescription pain medicines and are pain-free enough to react quickly and make an emergency stop if necessary.    Diet:    Return to the diet you were on before surgery.    Drink plenty of  water.    Avoid foods that cause constipation.      REMEMBER--most prescription pain pills cause constipation.  Walking, extra fluids, and increased fiber (fresh fruits and vegetables, etc.) are natural remedies for constipation.  You can also take mineral oil, 1-2 Tablespoons per day.  If still constipated you may try a stool softener such as Colace or Miralax.    Call Your Physician if You Have:    Redness, increased swelling or cloudy drainage from your incision.    A temperature of more than 101 degrees F.    Worsening pain in your incision not relieved by your prescription pain pills and/or a short  rest.    Any questions or concerns about your recovery, please call     Business hours [phone]    After hours [phone] Nurse Advice Line (24 hours a day)    Follow-up Care:    Make an appointment 2-3 weeks after your surgery if not already done.  Call [phone]    [facility] Same-Day [facility]   Adult Discharge Orders & Instructions     For 24 hours after surgery    1. Get plenty of rest.  A responsible adult must stay with you for at least 24 hours after you leave the hospital.   2. Do not drive or use heavy equipment.  If you have weakness or tingling, don't drive or use heavy equipment until this feeling goes away.  3. Do not drink alcohol.  4. Avoid strenuous or risky activities.  Ask for help when climbing stairs.   5. You may feel lightheaded.  If so, sit for a few minutes before standing.  Have someone help you get up.   6. You may have a slight fever. Call the doctor if your fever is over 100 F (37.7 C) (taken under the tongue) or lasts longer than 24 hours.  7. You may have a dry mouth, a sore throat, muscle aches or trouble sleeping.  These should go away after 24 hours.  8. Do not make important or legal decisions.  Based on the surgery/procedure that you had today, we do not expect that you will have any problems.  However, we want you to know what to do if you have pain, nausea, bleeding,or infection:  To control pain:  Take medicines your physician has prescribed or or over-the counter medicine he or she advises.  Ice packs and periods of rest are often helpful.  For surgery on an arm or leg, raise it on a pillow to ease swelling.  If your pain is not managed with the above methods, contact your physician.  To control nausea:  Take anti-nausea medicine approved by your physician.  Drink clear liquids such as apple juice, ginger ale, broth or 7-Up. Be sure to drink enough fluids.  Move to a regular diet as you feel able.  Rest may also help.  Bleeding:  You may see a little blood on  your dressing, about the size of a quarter in the first 24 hours.  If you see this, there is no reason to be alarmed.  However, if this continues to increase in size, apply pressure if able, and notify your physician.  Infection: Please contact your physician if you have any of the following signs:  redness, swelling, heat, increasing pain or foul-smelling drainage at your surgery site, fever or chills.    Call your doctor for any of the followin.  It has been over 8 to 10 hours since surgery and you are still not able to urinate (pass water).    2.  Headache for over 24 hours.    Nurse advice line: [phone]

## 2018-12-17 NOTE — TELEPHONE ENCOUNTER
Spoke to patient, he has rescheduled 12/28 appointment to 12/26/18, in [place].  Kelsea Linares CMA

## 2018-12-17 NOTE — OR NURSING
Verbal report received from Janeen HERNANDEZ OR RN and Zain LOPEZ. Phase 1 recovery assumed by Shruthi DOTSON. Pt post-op general anesthesia for lap right inguinal hernia repair per .

## 2018-12-18 NOTE — OP NOTE
OPERATIVE NOTE  [facility]    DATE:  12/17/2018    PROCEDURE:  Laparoscopic right inguinal hernia repair with mesh; excision of cord lipoma      PREOPERATIVE DIAGNOSIS:  right inguinal hernia.       POSTOPERATIVE DIAGNOSIS:  Right inguinal hernia, cord lipoma      SURGEON:  Skylar Dudley DO       ASSISTANT:  MS3      ANESTHESIA:  General endotracheal anesthesia.       COMPLICATIONS:  None immediately.       ESTIMATED BLOOD LOSS:  Minimal.       SPECIMENS:  None.       INDICATIONS:  43-year-old male who experienced right groin pain and bulge through the external ring.  He was sent to my clinic for surgical consultation of inguinal hernia repair.  On physical exam, he had a right-sided inguinal hernia.    No contralateral inguinal hernia was noted on exam.  We discussed his options of watchful waiting versus hernia repair with mesh.  He is having increasing symptoms and at times the bulge is quite large and painful, so he wants it fixed.  We discussed laparoscopic versus open.  Thus he decided upon laparoscopic inguinal hernia repair with mesh on the right.       DESCRIPTION OF PROCEDURE:  After informed consent was obtained, the patient was brought from the preoperative holding area to the operating room and placed in the supine position.  Anesthesia was induced.  He was prepped and draped in normal sterile fashion.  A timeout was performed.  After correct patient and correct procedure were verified, I began by making a supraumbilical 10 mm incision.  The abdominal cavity was entered via a Mare cut down.  A 12mm trocar was placed.  Camera was inserted.  General survey of the abdomen revealed a right inguinal hernia and no hernia on the left side.  There were no other gross abnormalities.  Two additional 5 mm trocars were placed in left and right mid abdomen under direct visualization.  We began by incising the peritoneum about 10 cm superior to the right groin hernia.  This incision was elongated  medially and laterally.  Using 2 blunt dissectors, I dissected medially and inferiorly down towards the pubic symphysis.  I did encounter the pubic symphysis and moved my attention laterally.  Laterally, I dissected down to the anterior axillary line and just before the great vessels of the iliac vessels.  A small connective tissue adhesion was transected using EndoShears to accommodate the preperitoneal dissection.  Once the area was dissected down to the hernia sac, the hernia sac was grasped and dissected away from the cord contents with blunt dissection.  Eventually, the end of the hernia sac was encountered and the hernia was fully reduced.  There were a couple of decent sized cord lipomas that were  from the cord contents as well.  The cord lipoma was removed at the end of the case via an Endocatch.  Once the hernia sac had been completely dissected away from the inguinal contents, we chose a right-sided Bard 3D max large mesh for our mesh repair.  This was brought through the abdomen, it was unfolded and placed overlying the inguinal region.  Once in appropriate position, it was secured into place using a Securestrap tacking device just at the Jason ligament medially.  Additional tacks were placed laterally, so that the mesh would not rotate once we reperitonealized and desufflated the abdomen.  The mesh was in adequate position.  We then placed an ON-Q pain through a separate stab incision under direct visualization.  I placed the On-Q catheter within the area of the mesh; We then reperitonealized with the tacking device, being care not to tack the catheter against the abdominal wall.  Once the peritoneum was reapproximated.  The abdomen was desufflated while the ports were removed under direct visualization.  The fascia at the supraumbilical site was closed with an 0-vicryl in a figure of eight fashion. There was a good amount of gas in the scrotum.  This was evacuated to the best of our ability  as well.  Remaining trocar was removed after all the air was desufflated.  The skin was anesthetized with 0.25% Marcaine with epinephrine for local anesthesia.  Skin was closed with inverted interrupted 4-0 Monocryl sutures and a Dermabond dressing was applied.  At the completion of the case, all instruments, needles and sponges were accounted for with a correct count.  The patient was brought from the operating room to the recovery room in stable condition.           [facility], DO

## 2018-12-19 LAB — COPATH REPORT: NORMAL

## 2018-12-19 NOTE — OR NURSING
[facility]  Discharge Call Back  Dustin Mccoy  1975  MRN: [id number]  Home: [phone] (home)   PCP: No Ref-Primary, Physician    We are calling to see how you're doing since your surgery/procedure with us?   Comments: inguinal hernia repair   Clinical Questions  1. Have you had time to look at your discharge instructions? Do you have any questions in regards to the instructions?   Comment: no questions   2. Do you feel your pain is being controlled with the regimen the surgeon sent you home on? (ie: prescription medications, over the counter pain medications, ice packs)   Comments: sore, but only taking pills during the day   3. Have you noticed any drainage on your dressing? Do you know what to do if you have bleeding as a result of your procedure?   Comments: none noted   4. Have you had any nausea/vomiting? Do you know how to treat this?   Comment: none  5. Have you had any signs/symptoms of infection? (ie: fever, swelling, heat, drainage or redness) Do you know what to do if you have?   Comment: none  6. Do you have a follow up appointment made with your surgeon? Do you have a number to contact them at if you need it?   Comment: yes I do   Retained Foreign Object (LILY, Hemovac, Penrose, Wound Packing, Vaginal Packing, Nasal Splints, Urethral Stents, Colmenares Catheter)  1. Do you still have on Q pump in place?   2. If the item is still in place, can you review the plan for removal with me? Will remove today   Recognition  Is there anyone from your care team that you would like to recognize?   Comments: my whole team did great   Improvement  Our goal is to be the best. Do you have any suggestions for things that we could improve on?   Comments: nothing at all   You may be randomly selected to fill out a [facility] survey. We would appreciate you taking the time to fill this out. It is important to us if you would answer all of the questions on the  survey.

## 2018-12-26 ENCOUNTER — OFFICE VISIT (OUTPATIENT)
Dept: SURGERY | Facility: OTHER | Age: 43
End: 2018-12-26
Payer: COMMERCIAL

## 2018-12-26 VITALS
HEIGHT: 71 IN | TEMPERATURE: 97.9 F | DIASTOLIC BLOOD PRESSURE: 88 MMHG | SYSTOLIC BLOOD PRESSURE: 142 MMHG | WEIGHT: 236.5 LBS | BODY MASS INDEX: 33.11 KG/M2

## 2018-12-26 DIAGNOSIS — Z87.19 S/P LAPAROSCOPIC HERNIA REPAIR: Primary | ICD-10-CM

## 2018-12-26 DIAGNOSIS — Z98.890 S/P LAPAROSCOPIC HERNIA REPAIR: Primary | ICD-10-CM

## 2018-12-26 PROCEDURE — 99024 POSTOP FOLLOW-UP VISIT: CPT | Performed by: SURGERY

## 2018-12-26 RX ORDER — OMEGA-3 FATTY ACIDS/FISH OIL 300-1000MG
200 CAPSULE ORAL EVERY 4 HOURS PRN
COMMUNITY

## 2018-12-26 ASSESSMENT — PAIN SCALES - GENERAL: PAINLEVEL: MODERATE PAIN (4)

## 2018-12-26 ASSESSMENT — MIFFLIN-ST. JEOR: SCORE: 1977.82

## 2018-12-26 NOTE — TELEPHONE ENCOUNTER
I have attempted to call the pt to update a PHQ-9. I left message for pt to call back. Giovanna Melton CMA ([facility])

## 2018-12-26 NOTE — LETTER
[address]  [facility] [address]  Phone: [phone]    December 26, 2018        Dustin Mccoy  [address] [facility] [address]          To whom it may concern:    RE: Dustin Mccoy    Patient was seen and treated today at our clinic.  Patient may return to work 1/2/2019 with the following:  Light duty-unable to lift more than 15 pounds until 1/28/2019.    Please contact me for questions or concerns.      Sincerely,          Skylar Dudley MD

## 2018-12-26 NOTE — PROGRESS NOTES
"/88 (BP Location: Right arm, Patient Position: Sitting, Cuff Size: Adult Large)   Temp 97.9  F (36.6  C) (Temporal)   Ht 1.8 m (5' 10.87\")   Wt 107.3 kg (236 lb 8 oz)   BMI 33.11 kg/m    Body mass index is 33.11 kg/m .  5' 10.87\"  236 lbs 8 oz        Kelsea Linares MA on 12/26/2018 at 8:44 AM    "

## 2018-12-26 NOTE — LETTER
"    12/26/2018         RE: Dustin Mccoy  [address] [facility] [address]        Dear Colleague,    Thank you for referring your patient, Dustin Mccoy, to the [facility]. Please see a copy of my visit note below.    /88 (BP Location: Right arm, Patient Position: Sitting, Cuff Size: Adult Large)   Temp 97.9  F (36.6  C) (Temporal)   Ht 1.8 m (5' 10.87\")   Wt 107.3 kg (236 lb 8 oz)   BMI 33.11 kg/m     Body mass index is 33.11 kg/m .  5' 10.87\"  236 lbs 8 oz        Kelsea Linares MA on 12/26/2018 at 8:44 AM      [facility] FOLLOW-UP NOTE  GENERAL SURGERY    PCP: No Ref-Primary, Physician         Assessment and Plan:   Assessment:   Dustin Mccoy is a 43 year old male who presented post operatively from lap inguinal, right on 12/17/18 and is doing well.       ICD-10-CM    1. S/P laparoscopic hernia repair, right Z98.890     Z87.19        I reviewed the pathology report today with the patient and answered all questions.  SPECIMEN(S):   Right cord lipoma     FINAL DIAGNOSIS:   Right cord lipoma:   - Lipoma.     Electronically signed out by:     Schuyler Butler M.D., PhD     Plan:  Pain well controlled; still sore as expected.  Incisions CDI; did not do groin exam as  in that area; no obvious bulge noted.  Return to work on 1/2/2018 but will be on light duties and lifting restriction of <15lbs for 6 weeks from DOS.           Subjective:     Dustin Mccoy is a 43 year old male who presents post operatively from lap inguinal, right on 12/17/18 . Pain has been controled. Currently is using pain medications. Eating a Regular and having regular bladder/bowel function. Overall doing well.           Objective:       /88 (BP Location: Right arm, Patient Position: Sitting, Cuff Size: Adult Large)   Temp 97.9  F (36.6  C) (Temporal)   Ht 1.8 m (5' 10.87\")   Wt 107.3 kg (236 lb 8 oz)   BMI 33.11 kg/m      Constitutional: Awake, alert, in no acute " distress.  Respiratory: Non-labored.   Cardiovascular: Regular rate and rhythm.   Abdomen: soft. Incision is Healing well.  Right mons no obvious bulge or seroma.  Did not check for hernia as patient was quite tender in that area.    Skylar Dudley DO  [facility]              Again, thank you for allowing me to participate in the care of your patient.        Sincerely,        Skylar Dudley MD

## 2018-12-26 NOTE — PROGRESS NOTES
"[facility] FOLLOW-UP NOTE  GENERAL SURGERY    PCP: No Ref-Primary, Physician         Assessment and Plan:   Assessment:   Dustin Mccoy is a 43 year old male who presented post operatively from lap inguinal, right on 12/17/18 and is doing well.       ICD-10-CM    1. S/P laparoscopic hernia repair, right Z98.890     Z87.19        I reviewed the pathology report today with the patient and answered all questions.  SPECIMEN(S):   Right cord lipoma     FINAL DIAGNOSIS:   Right cord lipoma:   - Lipoma.     Electronically signed out by:     Schuyler Butler M.D., PhD     Plan:  Pain well controlled; still sore as expected.  Incisions CDI; did not do groin exam as  in that area; no obvious bulge noted.  Return to work on 1/2/2018 but will be on light duties and lifting restriction of <15lbs for 6 weeks from DOS.           Subjective:     Dustin Mccoy is a 43 year old male who presents post operatively from lap inguinal, right on 12/17/18 . Pain has been controled. Currently is using pain medications. Eating a Regular and having regular bladder/bowel function. Overall doing well.           Objective:       /88 (BP Location: Right arm, Patient Position: Sitting, Cuff Size: Adult Large)   Temp 97.9  F (36.6  C) (Temporal)   Ht 1.8 m (5' 10.87\")   Wt 107.3 kg (236 lb 8 oz)   BMI 33.11 kg/m     Constitutional: Awake, alert, in no acute distress.  Respiratory: Non-labored.   Cardiovascular: Regular rate and rhythm.   Abdomen: soft. Incision is Healing well.  Right mons no obvious bulge or seroma.  Did not check for hernia as patient was quite tender in that area.    [facility], DO  [facility]            "

## 2018-12-27 NOTE — TELEPHONE ENCOUNTER
Pt didn't return phone calls. PHQ-9 missed. I will close encounter until further outreach. Giovanna Melton CMA ([facility])

## 2019-01-16 ENCOUNTER — OFFICE VISIT (OUTPATIENT)
Dept: PSYCHOLOGY | Facility: CLINIC | Age: 44
End: 2019-01-16
Payer: COMMERCIAL

## 2019-01-16 DIAGNOSIS — F32.2 SEVERE SINGLE CURRENT EPISODE OF MAJOR DEPRESSIVE DISORDER, WITHOUT PSYCHOTIC FEATURES (H): Primary | ICD-10-CM

## 2019-01-16 PROCEDURE — 90847 FAMILY PSYTX W/PT 50 MIN: CPT | Performed by: MARRIAGE & FAMILY THERAPIST

## 2019-01-16 NOTE — PROGRESS NOTES
Progress Note    Client Name: Dustin Mccoy  Date: 1/16/2019       Service Type: Family with client present      Session Start Time: 9am  Session End Time: 9:50am      Session Length: 50 mins     Session #: 4     Attendees: Spouse / Significant Other       PHQ-9 / MAIK-7 : declined     DATA      Progress Since Last Session (Related to Symptoms / Goals / Homework):   Symptoms: No change since last session, difficulty making changes, trusting    Homework: Partially completed      Episode of Care Goals: Satisfactory progress - PREPARATION (Decided to change - considering how); Intervened by negotiating a change plan and determining options / strategies for behavior change, identifying triggers, exploring social supports, and working towards setting a date to begin behavior change     Current / Ongoing Stressors and Concerns:   Occupational, marital/relationships, financial, medical- recent hernia repair 12/2018     Treatment Objective(s) Addressed in This Session:   Consider ways to build trust, increase communication with wife  ID 1-2 stressors contributing to marital conflict     Intervention:   Solution focused, CBT, couples counseling   Couple has been having difficulties making changes, letting go of the past, and trusting each other.  Discussed barriers that get in the way of the moving forward together.  Discussed action plan:consider making changes with phones- non smart phone/changing phone number, considering no electronic days or times in which they put away electronics and spend quality time, combine bank accounts and credit cards for transparency, change in delete emails/share email, and left the past to be in the past/start fresh.  Couple is in agreement that they are going to try to make these changes, and we will reconvene next session to discuss progress and whether they are planning to move forward with marital counseling.Client plans to look into  schooling for nursing and other job options as 12 hours shifts have been difficult for him and family.    ASSESSMENT: Current Emotional / Mental Status (status of significant symptoms):   Risk status (Self / Other harm or suicidal ideation)   Client denies current fears or concerns for personal safety.   Client denies current or recent suicidal ideation or behaviors.   Client denies current or recent homicidal ideation or behaviors.   Client denies current or recent self injurious behavior or ideation.   Client denies other safety concerns.   Client Client reports there has been a change in risk factors since their last session.  working on marital relationship, and ending extra marital relationships   Client Client reports there has been no change in protective factors since their last session.     A safety and risk management plan has not been developed at this time, however client was given the after-hours number / 911 should there be a change in any of these risk factors.     Appearance:   Appropriate    Eye Contact:   Good    Psychomotor Behavior: Normal    Attitude:   Cooperative    Orientation:   All   Speech    Rate / Production: Normal     Volume:  Normal    Mood:    Anxious  Depressed  Sad    Affect:    Appropriate    Thought Content:  Clear    Thought Form:  Coherent  Logical    Insight:    Fair      Medication Review:   No current psychiatric medications prescribed    Current Outpatient Medications   Medication     ibuprofen (ADVIL/MOTRIN) 200 MG capsule     No current facility-administered medications for this visit.         Medication Compliance:   NA     Changes in Health Issues:   None reported     Chemical Use Review:   Substance Use: Chemical use reviewed, no active concerns identified      Tobacco Use: No current tobacco use.       Collateral Reports Completed:   Routed note to Care Team Member(s)- Janeen Harry    PLAN: (Client Tasks / Therapist Tasks / Other)  See note section.          Siri  BABAR Ruff                                                      Treatment Plan    Client's Name: Dustin Mccoy  YOB: 1975    Date: 12/11/2018      DSM-V Diagnoses: Diagnoses:  296.23 (F32.2) Major Depressive Disorder, Single Episode, Severe With anxious distress  Psychosocial & Contextual Factors: marital distress, financial  WHODAS Score: 18    Referral / Collaboration:    Collaboration was initiated with PCP MD    Anticipated number of session or this episode of care: 8-16    MeasurableTreatment Goal(s) related to diagnosis / functional impairment(s)  Goal 1: Client will decrease depressed mood and anxiety as evidenced by PHQ9 under 5, and GAD7 under 5 in the next 6-9 months. 8/23/2018: PHQ9: 20, GAD7: 15    I will know I've met my goal when I feeling happier in my marriage, we get along and communicate, and we trust each other.      Objective #A (Client Action)    Client will identify 1-2 current life stresses that contribute to depressed mood and work towards eliminating stressors or reducing reactivity to stressors. Client will develop coping skills and learn anxiety reduction skills to manage anxiety and depressed mood.   Status: Continued - Date(s): 1/16/2019    Intervention(s)  Therapist will teach client how to ID stressors and decrease or eliminate reactivity to stressors. Teach coping skills and anxiety reduction techniques    Objective #B  Client will client will address relationship difficulties in a more adaptive manner and will learn strategies to resolve conflict adaptively and will learn and practice positive communication skills . Client will consider whether he would like to remain in present marriage.   Status: Continued - Date(s): 1/16/2019      Intervention(s)  Therapist will teach conflict resolution skills, communication skills, how to ID bodily cues anxiety triggers, anxiety reduction techniques      Client and family has reviewed and agreed to the above plan.      Siri  BABAR Ruff  December 11, 2018

## 2019-04-04 ENCOUNTER — OFFICE VISIT (OUTPATIENT)
Dept: PSYCHOLOGY | Facility: CLINIC | Age: 44
End: 2019-04-04
Payer: COMMERCIAL

## 2019-04-04 DIAGNOSIS — F32.2 SEVERE SINGLE CURRENT EPISODE OF MAJOR DEPRESSIVE DISORDER, WITHOUT PSYCHOTIC FEATURES (H): Primary | ICD-10-CM

## 2019-04-04 PROCEDURE — 90847 FAMILY PSYTX W/PT 50 MIN: CPT | Performed by: MARRIAGE & FAMILY THERAPIST

## 2019-04-04 NOTE — PROGRESS NOTES
Discharge Summary  Multiple Sessions    Client Name: Dustin Mccoy MRN#: [id number] YOB: 1975    Discharge Date:   April 4, 2019      Service Type: Family with client present      Session Start Time: 11:30pm  Session End Time: 12:15pm      Session Length: 45 - 50     Session #: 5     Attendees: Client and Spouse / Significant Other    Focus of Treatment Objective(s):  Client's presenting concerns included: Relational Problems related to: Conflict or difficulties with partner/spouse  Stage of Change at time of Discharge: ACTION (Actively working towards change)  Summary: Client and wife have been spending increased time together enjoying each other's company and talking. Client and wife report they plan to consolidate their finances, reduce phone time, and be more transparent with phone use. Discussed that provider is leaving the clinic as of 4/5/19 and discussed transitioning services to another provider closer to home: [facility]: Kell Garza. Discussed that they feel monthly appointments for accountability will helpful. Discussed use of cool down time frames, and talking and listening turns as needed.   Medication Adherence:  NA   Current Outpatient Medications   Medication     ibuprofen (ADVIL/MOTRIN) 200 MG capsule     No current facility-administered medications for this visit.          Chemical Use:  Yes social alcohol use 1-2 times a week    Assessment: Current Emotional / Mental Status (status of significant symptoms):    Risk status (Self / Other harm or suicidal ideation)  Client denies current fears or concerns for personal safety.  Client denies current or recent suicidal ideation or behaviors.  Client denies current or recent homicidal ideation or behaviors.  Client denies current or recent self injurious behavior or ideation.  Client denies other safety concerns.  A safety and risk management plan has not been developed at this time, however client was given  the after-hours number should there be a change in any of these risk factors.    Appearance:   Appropriate   Eye Contact:   Good   Psychomotor Behavior: Normal   Attitude:   Cooperative   Orientation:   All  Speech   Rate / Production: Normal    Volume:  Normal   Mood:    Anxious  Normal  Affect:    Appropriate   Thought Content:  Clear   Thought Form:  Coherent  Logical   Insight:   Fair     DSM5 Diagnoses: (Sustained by DSM5 Criteria Listed Above)  Diagnoses: 296.23 (F32.2) Major Depressive Disorder, Single Episode, Severe With anxious distress  Adjustment Disorders  309.24 (F43.22) With anxiety  Psychosocial & Contextual Factors: marital distress  WHODAS Score: 18    Reason for Discharge:  Referred to provider closer to home as this provider is leaving the clinic      Aftercare Plan:  Client will follow-up with [facility]: Kell Garza. Referral made by current therapist.      Siri Ruff

## 2019-05-29 ENCOUNTER — OFFICE VISIT (OUTPATIENT)
Dept: FAMILY MEDICINE | Facility: OTHER | Age: 44
End: 2019-05-29
Payer: COMMERCIAL

## 2019-05-29 VITALS
TEMPERATURE: 96.6 F | HEIGHT: 71 IN | OXYGEN SATURATION: 100 % | BODY MASS INDEX: 32.91 KG/M2 | HEART RATE: 72 BPM | RESPIRATION RATE: 16 BRPM | DIASTOLIC BLOOD PRESSURE: 88 MMHG | WEIGHT: 235.1 LBS | SYSTOLIC BLOOD PRESSURE: 130 MMHG

## 2019-05-29 DIAGNOSIS — Z12.5 SCREENING FOR PROSTATE CANCER: ICD-10-CM

## 2019-05-29 DIAGNOSIS — Z11.3 SCREEN FOR STD (SEXUALLY TRANSMITTED DISEASE): ICD-10-CM

## 2019-05-29 DIAGNOSIS — Z00.00 ENCOUNTER FOR PREVENTIVE CARE: Primary | ICD-10-CM

## 2019-05-29 LAB
ANION GAP SERPL CALCULATED.3IONS-SCNC: 3 MMOL/L (ref 3–14)
BASOPHILS # BLD AUTO: 0 10E9/L (ref 0–0.2)
BASOPHILS NFR BLD AUTO: 0.5 %
BUN SERPL-MCNC: 13 MG/DL (ref 7–30)
CALCIUM SERPL-MCNC: 8.8 MG/DL (ref 8.5–10.1)
CHLORIDE SERPL-SCNC: 105 MMOL/L (ref 94–109)
CHOLEST SERPL-MCNC: 185 MG/DL
CO2 SERPL-SCNC: 31 MMOL/L (ref 20–32)
CREAT SERPL-MCNC: 0.93 MG/DL (ref 0.66–1.25)
DIFFERENTIAL METHOD BLD: NORMAL
EOSINOPHIL # BLD AUTO: 0.1 10E9/L (ref 0–0.7)
EOSINOPHIL NFR BLD AUTO: 1.4 %
ERYTHROCYTE [DISTWIDTH] IN BLOOD BY AUTOMATED COUNT: 13.3 % (ref 10–15)
GFR SERPL CREATININE-BSD FRML MDRD: >90 ML/MIN/{1.73_M2}
GLUCOSE SERPL-MCNC: 92 MG/DL (ref 70–99)
HCT VFR BLD AUTO: 45.7 % (ref 40–53)
HDLC SERPL-MCNC: 38 MG/DL
HGB BLD-MCNC: 15.2 G/DL (ref 13.3–17.7)
LDLC SERPL CALC-MCNC: 124 MG/DL
LYMPHOCYTES # BLD AUTO: 2.2 10E9/L (ref 0.8–5.3)
LYMPHOCYTES NFR BLD AUTO: 36.8 %
MCH RBC QN AUTO: 29 PG (ref 26.5–33)
MCHC RBC AUTO-ENTMCNC: 33.3 G/DL (ref 31.5–36.5)
MCV RBC AUTO: 87 FL (ref 78–100)
MONOCYTES # BLD AUTO: 0.5 10E9/L (ref 0–1.3)
MONOCYTES NFR BLD AUTO: 9.2 %
NEUTROPHILS # BLD AUTO: 3 10E9/L (ref 1.6–8.3)
NEUTROPHILS NFR BLD AUTO: 52.1 %
NONHDLC SERPL-MCNC: 147 MG/DL
PLATELET # BLD AUTO: 233 10E9/L (ref 150–450)
POTASSIUM SERPL-SCNC: 4.3 MMOL/L (ref 3.4–5.3)
PSA SERPL-ACNC: 0.8 UG/L (ref 0–4)
RBC # BLD AUTO: 5.25 10E12/L (ref 4.4–5.9)
SODIUM SERPL-SCNC: 139 MMOL/L (ref 133–144)
TRIGL SERPL-MCNC: 115 MG/DL
WBC # BLD AUTO: 5.8 10E9/L (ref 4–11)

## 2019-05-29 PROCEDURE — 80061 LIPID PANEL: CPT | Performed by: PHYSICIAN ASSISTANT

## 2019-05-29 PROCEDURE — 87591 N.GONORRHOEAE DNA AMP PROB: CPT | Performed by: PHYSICIAN ASSISTANT

## 2019-05-29 PROCEDURE — 85025 COMPLETE CBC W/AUTO DIFF WBC: CPT | Performed by: PHYSICIAN ASSISTANT

## 2019-05-29 PROCEDURE — 99396 PREV VISIT EST AGE 40-64: CPT | Performed by: PHYSICIAN ASSISTANT

## 2019-05-29 PROCEDURE — G0103 PSA SCREENING: HCPCS | Performed by: PHYSICIAN ASSISTANT

## 2019-05-29 PROCEDURE — 80048 BASIC METABOLIC PNL TOTAL CA: CPT | Performed by: PHYSICIAN ASSISTANT

## 2019-05-29 PROCEDURE — 87491 CHLMYD TRACH DNA AMP PROBE: CPT | Performed by: PHYSICIAN ASSISTANT

## 2019-05-29 PROCEDURE — 36415 COLL VENOUS BLD VENIPUNCTURE: CPT | Performed by: PHYSICIAN ASSISTANT

## 2019-05-29 ASSESSMENT — ENCOUNTER SYMPTOMS
SORE THROAT: 0
NERVOUS/ANXIOUS: 0
ARTHRALGIAS: 0
COUGH: 0
HEMATOCHEZIA: 0
NAUSEA: 0
CONSTIPATION: 0
FREQUENCY: 0
CHILLS: 0
WEAKNESS: 0
DYSURIA: 0
EYE PAIN: 0
DIZZINESS: 0
ABDOMINAL PAIN: 1
HEADACHES: 0
PARESTHESIAS: 0
SHORTNESS OF BREATH: 0
PALPITATIONS: 0
MYALGIAS: 0
HEARTBURN: 0
DIARRHEA: 0
JOINT SWELLING: 0
HEMATURIA: 0
FEVER: 0

## 2019-05-29 ASSESSMENT — MIFFLIN-ST. JEOR: SCORE: 1960.6

## 2019-05-29 ASSESSMENT — PATIENT HEALTH QUESTIONNAIRE - PHQ9: SUM OF ALL RESPONSES TO PHQ QUESTIONS 1-9: 2

## 2019-05-29 ASSESSMENT — PAIN SCALES - GENERAL: PAINLEVEL: MILD PAIN (2)

## 2019-05-29 NOTE — NURSING NOTE
Health Maintenance Due   Topic Date Due     PREVENTIVE CARE VISIT  1975     PHQ-9  1975     Julia LOVE LPN

## 2019-05-29 NOTE — PROGRESS NOTES
SUBJECTIVE:   CC: Dustin Mccoy is an 44 year old male who presents for preventative health visit.     Healthy Habits:     Getting at least 3 servings of Calcium per day:  NO    Bi-annual eye exam:  NO    Dental care twice a year:  Yes    Sleep apnea or symptoms of sleep apnea:  None    Diet:  Regular (no restrictions)    Frequency of exercise:  2-3 days/week    Duration of exercise:  15-30 minutes    Taking medications regularly:  Yes    Medication side effects:  None    PHQ-2 Total Score: 0    Additional concerns today:  No          Patient is a 44 year old male who presents for annual preventative. 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 He was last seen in May of 2018 for depressed mood and concerns about a skin lesion on the glans of his penis. He reports that he and his wife continue to have a adelaida marriage, but are utilizing couples and individual counseling. He says that they will be getting back together again and he would like to screen for STDs. Denies symptoms or exposure. No other health concerns at this time.     Today's PHQ-2 Score:   PHQ-2 ( 1999 [employer]) 5/29/2019   Q1: Little interest or pleasure in doing things 0   Q2: Feeling down, depressed or hopeless 0   PHQ-2 Score 0   Q1: Little interest or pleasure in doing things Not at all   Q2: Feeling down, depressed or hopeless Not at all   PHQ-2 Score 0       Abuse: Current or Past(Physical, Sexual or Emotional)- No  Do you feel safe in your environment? Yes    Social History     Tobacco Use     Smoking status: Never Smoker     Smokeless tobacco: Never Used   Substance Use Topics     Alcohol use: Yes     Comment: occasional         Alcohol Use 5/29/2019   Prescreen: >3 drinks/day or >7 drinks/week? No       Last PSA: No results found for: PSA    Reviewed orders with patient. Reviewed health maintenance and updated orders accordingly - Yes    Reviewed  "and updated as needed this visit by clinical staff  Tobacco  Allergies  Meds  Med Hx  Surg Hx  Fam Hx  Soc Hx        Reviewed and updated as needed this visit by Provider         Review of Systems   Constitutional: Negative for chills and fever.   HENT: Negative for congestion, ear pain, hearing loss and sore throat.    Eyes: Negative for pain and visual disturbance.   Respiratory: Negative for cough and shortness of breath.    Cardiovascular: Negative for chest pain, palpitations and peripheral edema.   Gastrointestinal: Positive for abdominal pain. Negative for constipation, diarrhea, heartburn, hematochezia and nausea.   Genitourinary: Negative for dysuria, frequency, genital sores, hematuria and urgency.   Musculoskeletal: Negative for arthralgias, joint swelling and myalgias.   Skin: Negative for rash.   Neurological: Negative for dizziness, weakness, headaches and paresthesias.   Psychiatric/Behavioral: Negative for mood changes. The patient is not nervous/anxious.          OBJECTIVE:   /88 (BP Location: Right arm, Patient Position: Chair, Cuff Size: Adult Large)   Pulse 72   Temp 96.6  F (35.9  C) (Oral)   Resp 16   Ht 1.791 m (5' 10.5\")   Wt 106.6 kg (235 lb 1.6 oz)   SpO2 100%   BMI 33.26 kg/m      Physical Exam  GENERAL: healthy, alert and no distress  EYES: Eyes grossly normal to inspection, PERRL and conjunctivae and sclerae normal  HENT: ear canals and TM's normal, nose and mouth without ulcers or lesions  NECK: no adenopathy, no asymmetry, masses, or scars and thyroid normal to palpation  RESP: lungs clear to auscultation - no rales, rhonchi or wheezes  CV: regular rate and rhythm, normal S1 S2, no S3 or S4, no murmur, click or rub, no peripheral edema and peripheral pulses strong  ABDOMEN: soft, nontender, no hepatosplenomegaly, no masses and bowel sounds normal  MS: no gross musculoskeletal defects noted, no edema  SKIN: no suspicious lesions or rashes  NEURO: Normal strength and " tone, mentation intact and speech normal  PSYCH: mentation appears normal, affect normal/bright    Diagnostic Test Results:  Results for orders placed or performed in visit on 05/29/19   Lipid Profile   Result Value Ref Range    Cholesterol 185 <200 mg/dL    Triglycerides 115 <150 mg/dL    HDL Cholesterol 38 (L) >39 mg/dL    LDL Cholesterol Calculated 124 (H) <100 mg/dL    Non HDL Cholesterol 147 (H) <130 mg/dL   CBC with platelets and differential   Result Value Ref Range    WBC 5.8 4.0 - 11.0 10e9/L    RBC Count 5.25 4.4 - 5.9 10e12/L    Hemoglobin 15.2 13.3 - 17.7 g/dL    Hematocrit 45.7 40.0 - 53.0 %    MCV 87 78 - 100 fl    MCH 29.0 26.5 - 33.0 pg    MCHC 33.3 31.5 - 36.5 g/dL    RDW 13.3 10.0 - 15.0 %    Platelet Count 233 150 - 450 10e9/L    % Neutrophils 52.1 %    % Lymphocytes 36.8 %    % Monocytes 9.2 %    % Eosinophils 1.4 %    % Basophils 0.5 %    Absolute Neutrophil 3.0 1.6 - 8.3 10e9/L    Absolute Lymphocytes 2.2 0.8 - 5.3 10e9/L    Absolute Monocytes 0.5 0.0 - 1.3 10e9/L    Absolute Eosinophils 0.1 0.0 - 0.7 10e9/L    Absolute Basophils 0.0 0.0 - 0.2 10e9/L    Diff Method Automated Method    Basic metabolic panel  (Ca, Cl, CO2, Creat, Gluc, K, Na, BUN)   Result Value Ref Range    Sodium 139 133 - 144 mmol/L    Potassium 4.3 3.4 - 5.3 mmol/L    Chloride 105 94 - 109 mmol/L    Carbon Dioxide 31 20 - 32 mmol/L    Anion Gap 3 3 - 14 mmol/L    Glucose 92 70 - 99 mg/dL    Urea Nitrogen 13 7 - 30 mg/dL    Creatinine 0.93 0.66 - 1.25 mg/dL    GFR Estimate >90 >60 mL/min/[1.73_m2]    GFR Estimate If Black >90 >60 mL/min/[1.73_m2]    Calcium 8.8 8.5 - 10.1 mg/dL   PSA, screen   Result Value Ref Range    PSA 0.80 0 - 4 ug/L   NEISSERIA GONORRHOEA PCR   Result Value Ref Range    Specimen Descrip Urine     N Gonorrhea PCR Negative NEG^Negative   CHLAMYDIA TRACHOMATIS PCR   Result Value Ref Range    Specimen Description Urine     Chlamydia Trachomatis PCR Negative NEG^Negative       ASSESSMENT/PLAN:   1. Encounter  "for preventive care  The 10-year ASCVD risk score (Leonie RUSSELL Jr., et al., 2013) is: 2.9%    Values used to calculate the score:      Age: 44 years      Sex: Male      Is Non- : No      Diabetic: No      Tobacco smoker: No      Systolic Blood Pressure: 130 mmHg      Is BP treated: No      HDL Cholesterol: 38 mg/dL      Total Cholesterol: 185 mg/dL  Patient encouraged to maintain healthy diet and regular exercise. Recheck at next annual preventative.   - Lipid Profile  - CBC with platelets and differential  - Basic metabolic panel  (Ca, Cl, CO2, Creat, Gluc, K, Na, BUN)    2. Screen for STD (sexually transmitted disease)  Results returned negative for signs of infection. 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   - NEISSERIA GONORRHOEA PCR  - CHLAMYDIA TRACHOMATIS PCR    3. Screening for prostate cancer  No current symptoms or family history. Labs returned within normal range.   - PSA, screen    COUNSELING:   Reviewed preventive health counseling, as reflected in patient instructions       Regular exercise       Healthy diet/nutrition       Vision screening       Safe sex practices/STD prevention       Prostate cancer screening       Advance Care Planning    Estimated body mass index is 33.26 kg/m  as calculated from the following:    Height as of this encounter: 1.791 m (5' 10.5\").    Weight as of this encounter: 106.6 kg (235 lb 1.6 oz).          reports that he has never smoked. He has never used smokeless tobacco.    Follow up with clinic for annual checkup or sooner if conditions change, worsen or fail to improve as expected.      Counseling Resources:  ATP IV Guidelines  Pooled Cohorts Equation Calculator  FRAX Risk Assessment  ICSI Preventive Guidelines  Dietary Guidelines for Americans, 2010  USDA's MyPlate  ASA Prophylaxis  Lung CA Screening    Marcelino Patrick PA-C  [facility]  "

## 2019-05-30 LAB
C TRACH DNA SPEC QL NAA+PROBE: NEGATIVE
N GONORRHOEA DNA SPEC QL NAA+PROBE: NEGATIVE
SPECIMEN SOURCE: NORMAL
SPECIMEN SOURCE: NORMAL

## 2020-01-09 NOTE — PROGRESS NOTES
Fax received from [employer] [employer] [facility] stating that the patient has been approved for their program.    Your patient is eligible to receive medication through the program from Jan 8 2020-Dec 31 2020.  Each time the patient needs a new supply of medication, it can be re-ordered in one of the following ways:    Contact [facility] at [phone] (call with questions as well)  Complete the order form included in the shipment of medication and mail or fax to:  [facility] Patient Assistance Program  [address]  [place] [address]  Fax: [phone]      
S-(situation): Patient discharged to home via ambulation with self    B-(background): Observation goals met     A-(assessment): Pt is A&O.  Has mild pain in his left elbow.  Ibuprofen has been effective for his pain.  VSS.  Afebrile.  Redness has gone down in his elbow.  Area was marked when he was admitted.      R-(recommendations): Discharge instructions reviewed with pt. Listed belongings gathered and returned to patient.  Patient Education resolved: Yes  New medications-Pt. Has been educated about reason of use and side effects Yes  Home and hospital acquired medications returned to patient NA  Medication Bin checked and emptied on discharge Yes      
S-(situation): Patient registered to Observation. Patient arrived to room 251 via wheelchair from ED    B-(background): worsening left elbow pain, redness and swelling over the past 3 days    A-(assessment): alert and oriented, vitals stable, afebrile, left elbow red, warm and swollen. Redness marked.     R-(recommendations): Orders and observation goals reviewed with pt    Nursing Observation criteria listed below was met:    Skin issues/needs documented:Yes  Isolation needs addressed, if appropriate: NA  Fall Prevention: Education given and documented and signage used: NA  Education Assessment documented:Yes  Education Documented (Pre-existing chronic infection such as, MRSA/VRE need education on admission): Yes  New medication patient education completed and documented (Possible Side Effects of Common Medications handout): Yes  Home medications if not able to send immediately home with family stored here: NA  Reminder note placed in discharge instructions: NA  Patient has discharge needs (If yes, please explain): No            
Initial

## 2020-03-09 ENCOUNTER — OFFICE VISIT (OUTPATIENT)
Dept: FAMILY MEDICINE | Facility: OTHER | Age: 45
End: 2020-03-09
Payer: COMMERCIAL

## 2020-03-09 VITALS
HEIGHT: 71 IN | OXYGEN SATURATION: 96 % | TEMPERATURE: 96.7 F | BODY MASS INDEX: 33.19 KG/M2 | HEART RATE: 72 BPM | WEIGHT: 237.1 LBS | SYSTOLIC BLOOD PRESSURE: 122 MMHG | RESPIRATION RATE: 16 BRPM | DIASTOLIC BLOOD PRESSURE: 76 MMHG

## 2020-03-09 DIAGNOSIS — Z87.19 H/O HERNIA REPAIR: ICD-10-CM

## 2020-03-09 DIAGNOSIS — R10.31 RIGHT INGUINAL PAIN: Primary | ICD-10-CM

## 2020-03-09 DIAGNOSIS — Z98.890 H/O HERNIA REPAIR: ICD-10-CM

## 2020-03-09 PROCEDURE — 99213 OFFICE O/P EST LOW 20 MIN: CPT | Performed by: PHYSICIAN ASSISTANT

## 2020-03-09 ASSESSMENT — MIFFLIN-ST. JEOR: SCORE: 1969.67

## 2020-03-09 ASSESSMENT — PAIN SCALES - GENERAL: PAINLEVEL: NO PAIN (0)

## 2020-03-09 ASSESSMENT — PATIENT HEALTH QUESTIONNAIRE - PHQ9: SUM OF ALL RESPONSES TO PHQ QUESTIONS 1-9: 0

## 2020-03-09 NOTE — PROGRESS NOTES
"Subjective     Dustin Mccoy is a 44 year old male who presents to clinic today for the following health issues:    HPI   Concern - pain in groin area, right side  Onset: 4-5 months    Description:   Pain in groin area    Intensity: moderate    Progression of Symptoms:  same and intermittent    Accompanying Signs & Symptoms:  none    Previous history of similar problem:   YES    Precipitating factors:   Worsened by: lifting    Alleviating factors:  Improved by: nothing    Therapies Tried and outcome: ibuprofen, slight relief    Patient is a 44 year old male who presents today to discuss right inguinal pain. He said that he underwent hernia repair in 2016. I see in records CT completed in 05/2016 noted fat containing hernia. This surgery was completed without complication. Patient states that about 4-5 months ago he began noticing twinges of pain off/on in this area, but chose to work through them attributing the discomfort to pulled muscle or similar condition. 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 He is in today as the pain has gradually become more constant and now has been waking him from sleep. He rates the pain at an 8/10 at its worst.     Reviewed and updated as needed this visit by Provider         Review of Systems   ROS COMP: Constitutional, HEENT, cardiovascular, pulmonary, gi and gu systems are negative, except as otherwise noted.      Objective    /76 (BP Location: Left arm, Patient Position: Chair, Cuff Size: Adult Large)   Pulse 72   Temp 96.7  F (35.9  C) (Oral)   Resp 16   Ht 1.791 m (5' 10.5\")   Wt 107.5 kg (237 lb 1.6 oz)   SpO2 96%   BMI 33.54 kg/m    Body mass index is 33.54 kg/m .  Physical Exam   GENERAL: healthy, alert and no distress  RESP: lungs clear to auscultation - no rales, rhonchi or wheezes  CV: regular rate and rhythm, normal S1 S2, no S3 or S4, no murmur, " click or rub, no peripheral edema and peripheral pulses strong  ABDOMEN: soft, nontender, no hepatosplenomegaly, no masses and bowel sounds normal   (male): normal male genitalia without lesions or urethral discharge, no hernia, tenderness to palpation along the right inguinal region   MS: no gross musculoskeletal defects noted, no edema  PSYCH: mentation appears normal, affect normal/bright    Diagnostic Test Results:  Labs reviewed in Epic        Assessment & Plan       ICD-10-CM    1. Right inguinal pain  R10.31 CT Abdomen Pelvis w Contrast   2. H/O hernia repair  Z98.890 CT Abdomen Pelvis w Contrast    Z87.19       Tenderness on exam today with history of physically demanding work. Pain has been increasing in intensity and frequency. Recommended CT scan to further evaluate for cause. Pending results consider general surgery consult.     Return in about 6 months (around 9/9/2020) for Return for scheduled annual checkup with PCP.    Marcelino Patrick PA-C  [facility]

## 2020-03-09 NOTE — PATIENT INSTRUCTIONS
Patient Education     Hernia (Adult)    A hernia can happen when there is a weakness or defect in the wall of the abdomen or groin. Intestines or nearby tissues may move from their usual location and push through the weakness in the wall. This can cause a hernia (bulge) you may see or feel.  Causes and risk factors   A hernia may be present at birth. Or it may be caused by the wear and tear of daily living. Certain factors can make a hernia more likely. These can include:    Heavy lifting    Straining, whether from lifting, movement, or constipation    Chronic cough    Injury to the abdominal wall    Excess weight    Pregnancy    Prior surgery    Older age    Family history of hernia  Symptoms  Symptoms of a hernia may come on suddenly. Or they may appear slowly over time. Some common symptoms include:    Bulge in the groin area, around the navel, or in the scrotum (the bulge may get bigger when you stand and go away when you lie down)    Pain or pressure around the bulge    Pain during activities such as lifting, coughing, or sneezing    A feeling of weakness or pressure in the groin    Pain or swelling in the scrotum  Types of hernias  There are different types of hernia. The type you have depends on its location:    Inguinal. This type is in the groin or scrotum. It is more common in men. But, women can get this hernia, too.    Femoral. This type is in the groin, upper thigh (where the leg bends), or labia. It is more common in women.    Ventral. This type is in the abdominal wall.    Umbilical. This type occurs around the navel (belly button).    Incisional. This type occurs at the site of a previous surgery.  The condition of the hernia can help determine how urgently it needs to be treated.    Reducible. It goes back in by itself, or it can be pushed back in.    Irreducible. It can t be pushed back in.    Incarcerated/strangulated. The intestine is trapped (incarcerated). If this happens, you won t be able  to push the bulge back in. If the incarcerated hernia isn t treated, it may become strangulated. This means the area loses blood supply and the tissue may die. This requires emergency surgery. You need treatment right away.  In most cases, a hernia will not heal on its own.You may need surgery to repair the defect in the abdominal wall or groin. You ll be told more about surgery, if needed.  If your symptoms are not severe, treatment may sometimes be delayed. In such cases, you will need regular follow-up visits with the provider. You ll be asked to keep track of your symptoms and to watch for signs of more serious problems. You may also be given guidelines similar to the home care instructions below.  Home care  To help keep a hernia from getting worse, you may be advised to:    Avoid heavy lifting and straining as directed.    Take steps to prevent constipation, such as eating more fiber and drinking more water. This may help reduce straining that can occur when having a bowel movement. Reducing straining may help keep your symptoms from getting worse.    Maintain a healthy weight or lose excess weight. This can help reduce strain on abdominal muscles and tissues.    Stop smoking. This can help prevent coughing that may also strain abdominal muscles and tissues.  Follow-up care  Follow up with your healthcare provider, or as directed. If imaging tests were done, they will be reviewed a doctor. You will be told the results and any new findings that may affect your care.  When to seek medical advice  Call your healthcare provider right away if any of these occur:    Hernia hardens, swells, or grows larger    Hernia can no longer be pushed back in    Pain moves to the lower right abdomen (just below the waistline), or spreads to the back  Call 911  Call 911 if any of these occur:    Severe pain, redness, or tenderness in the area near the hernia    Pain worsens quickly and doesn t get better    Inability to have a  bowel movement or pass gas    Fever of 100.4 F (38 C) or higher, or as directed by your healthcare provider  Date Last Reviewed: 3/1/2018    2327-7402 [employer]. [address] [place] [address], [place] [address]. All rights reserved. This information is not intended as a substitute for professional medical care. Always follow your healthcare professional's instructions.

## 2020-03-09 NOTE — NURSING NOTE
Health Maintenance Due   Topic Date Due     INFLUENZA VACCINE (1) 09/01/2019     PHQ-9  11/29/2019     Julia LOVE LPN

## 2020-03-10 ENCOUNTER — HOSPITAL ENCOUNTER (OUTPATIENT)
Dept: CT IMAGING | Facility: CLINIC | Age: 45
Discharge: HOME | End: 2020-03-10
Attending: PHYSICIAN ASSISTANT | Admitting: PHYSICIAN ASSISTANT
Payer: COMMERCIAL

## 2020-03-10 DIAGNOSIS — R10.31 RIGHT INGUINAL PAIN: ICD-10-CM

## 2020-03-10 DIAGNOSIS — Z87.19 H/O HERNIA REPAIR: ICD-10-CM

## 2020-03-10 DIAGNOSIS — Z98.890 H/O HERNIA REPAIR: ICD-10-CM

## 2020-03-10 PROCEDURE — 74177 CT ABD & PELVIS W/CONTRAST: CPT

## 2020-03-10 PROCEDURE — 25000125 ZZHC RX 250: Performed by: RADIOLOGY

## 2020-03-10 PROCEDURE — 25000128 H RX IP 250 OP 636: Performed by: RADIOLOGY

## 2020-03-10 RX ORDER — IOPAMIDOL 755 MG/ML
500 INJECTION, SOLUTION INTRAVASCULAR ONCE
Status: COMPLETED | OUTPATIENT
Start: 2020-03-10 | End: 2020-03-10

## 2020-03-10 RX ADMIN — SODIUM CHLORIDE 60 ML: 9 INJECTION, SOLUTION INTRAVENOUS at 13:14

## 2020-03-10 RX ADMIN — IOPAMIDOL 100 ML: 755 INJECTION, SOLUTION INTRAVENOUS at 13:14

## 2020-03-13 ENCOUNTER — MYC MEDICAL ADVICE (OUTPATIENT)
Dept: FAMILY MEDICINE | Facility: OTHER | Age: 45
End: 2020-03-13

## 2020-03-13 ENCOUNTER — TELEPHONE (OUTPATIENT)
Dept: FAMILY MEDICINE | Facility: OTHER | Age: 45
End: 2020-03-13

## 2020-03-13 DIAGNOSIS — R10.31 RIGHT INGUINAL PAIN: Primary | ICD-10-CM

## 2020-03-13 RX ORDER — LEVOFLOXACIN 500 MG/1
500 TABLET, FILM COATED ORAL DAILY
Qty: 10 TABLET | Refills: 0 | Status: SHIPPED | OUTPATIENT
Start: 2020-03-13 | End: 2020-04-01

## 2020-03-13 NOTE — TELEPHONE ENCOUNTER
Written by Marcelino Patrick PA-C on 3/13/2020  7:23 AM   Dear Dustin     I apologize for the delay in getting you your results. I have been out of the clinic for the past couple of days due to illness. The radiologist did note the small right sided inguinal hernia which you had referenced at your visit. It was felt to be stable when compared to the last CT in 2016. No other cause for your pain was noted. How are your symptoms? Have they changed, worsened or new symptoms appeared?       Marcelino WILSON-    Spoke with patient and informed of results above. Patient states No new symptoms. He is  feeling the same as when he was seen on Monday.     Routing to pcp as WILL.     Olga Sosa MA

## 2020-03-13 NOTE — TELEPHONE ENCOUNTER
Reason for Call:  Request for results:    Name of test or procedure: CT results, please call pt with results.     Date of test of procedure: 3.10    Location of the test or procedure: [facility]    OK to leave the result message on voice mail or with a family member? YES    Phone number Patient can be reached at:  [phone]    Additional comments:     Call taken on 3/13/2020 at 12:30 PM by Lora Lopez

## 2020-03-20 NOTE — TELEPHONE ENCOUNTER
Please call patient to ensure that he had received this message sent on the 03/13. Also check to see if he filled the medication and if his symptoms improved. Doris Fuentes,     I have sent out an antibiotic for you to start for possible epididymal infection which could cause similar pain to what you are experiencing. This medication is taken once daily for 10 days. If you do not feel that symptoms are improving with this treatment please update me on your symptoms. This medication is generally well tolerated, but has a rare adverse effect of causing aches/pains throughout the body. If this occurs please stop and call me. I would not recommend the medication if I did not feel it was safe, but always want to be informative to you. Have a great day.     Marcelino Patrick PA-C on 3/13/2020 at 12:53 PM

## 2020-03-20 NOTE — TELEPHONE ENCOUNTER
I left a call back message.    I am calling with the following per Marcelino Patrick PA-C:  Please call patient to ensure that he had received this message sent on the 03/13. Also check to see if he filled the medication and if his symptoms improved. Doris Fuentes,     I have sent out an antibiotic for you to start for possible epididymal infection which could cause similar pain to what you are experiencing. This medication is taken once daily for 10 days. If you do not feel that symptoms are improving with this treatment please update me on your symptoms. This medication is generally well tolerated, but has a rare adverse effect of causing aches/pains throughout the body. If this occurs please stop and call me. I would not recommend the medication if I did not feel it was safe, but always want to be informative to you. Have a great day.

## 2020-04-01 ENCOUNTER — NURSE TRIAGE (OUTPATIENT)
Dept: FAMILY MEDICINE | Facility: OTHER | Age: 45
End: 2020-04-01

## 2020-04-01 ENCOUNTER — OFFICE VISIT (OUTPATIENT)
Dept: FAMILY MEDICINE | Facility: CLINIC | Age: 45
End: 2020-04-01
Payer: COMMERCIAL

## 2020-04-01 VITALS
BODY MASS INDEX: 33.81 KG/M2 | SYSTOLIC BLOOD PRESSURE: 118 MMHG | RESPIRATION RATE: 20 BRPM | DIASTOLIC BLOOD PRESSURE: 70 MMHG | HEART RATE: 72 BPM | TEMPERATURE: 98 F | WEIGHT: 239 LBS

## 2020-04-01 DIAGNOSIS — R10.31 RIGHT INGUINAL PAIN: Primary | ICD-10-CM

## 2020-04-01 DIAGNOSIS — T14.8XXA PUNCTURE WOUND: ICD-10-CM

## 2020-04-01 PROCEDURE — 99213 OFFICE O/P EST LOW 20 MIN: CPT | Performed by: PHYSICIAN ASSISTANT

## 2020-04-01 RX ORDER — CYCLOBENZAPRINE HCL 10 MG
10 TABLET ORAL AT BEDTIME
Qty: 30 TABLET | Refills: 0 | Status: SHIPPED | OUTPATIENT
Start: 2020-04-01 | End: 2020-07-07

## 2020-04-01 RX ORDER — LEVOFLOXACIN 750 MG/1
750 TABLET, FILM COATED ORAL DAILY
Qty: 5 TABLET | Refills: 0 | Status: SHIPPED | OUTPATIENT
Start: 2020-04-01 | End: 2020-07-07

## 2020-04-01 ASSESSMENT — PAIN SCALES - GENERAL: PAINLEVEL: MODERATE PAIN (5)

## 2020-04-01 NOTE — TELEPHONE ENCOUNTER
"    Additional Information    Patient wants to be seen    Answer Assessment - Initial Assessment Questions  1. MECHANISM: \"How did the injury happen?\"      Was working on a car a couple days ago and jabbed a knife into my hand.  2. ONSET: \"When did the injury happen?\" (Minutes or hours ago)       Two days ago.  3. APPEARANCE of INJURY: \"What does the injury look like?\"       Jada got if wrapped up and it stopped bleeding.  4. SEVERITY: \"Can you use the hand normally?\" \"Can you bend your fingers into a ball and then fully open them?\"      Not asked  5. SIZE: For cuts, bruises, or swelling, ask: \"How large is it?\" (e.g., inches or centimeters;  entire hand or wrist)       Not asked  6. PAIN: \"Is there pain?\" If so, ask: \"How bad is the pain?\"  (Scale 1-10; or mild, moderate, severe)      Not asked  7. TETANUS: For any breaks in the skin, ask: \"When was the last tetanus booster?\"      NOT SURE.  8. OTHER SYMPTOMS: \"Do you have any other symptoms?\"       no  9. PREGNANCY: \"Is there any chance you are pregnant?\" \"When was your last menstrual period?\"      no    Protocols used: HAND AND WRIST INJURY-A-OH      "

## 2020-04-01 NOTE — NURSING NOTE
Health Maintenance Due   Topic Date Due     INFLUENZA VACCINE (1) 09/01/2019     Julia LOVE LPN  Prior to immunization administration, verified patients identity using patient s name and date of birth. Please see Immunization Activity for additional information.     Screening Questionnaire for Adult Immunization    Are you sick today?   No   Do you have allergies to medications, food, a vaccine component or latex?   Yes   Have you ever had a serious reaction after receiving a vaccination?   No   Do you have a long-term health problem with heart, lung, kidney, or metabolic disease (e.g., diabetes), asthma, a blood disorder, no spleen, complement component deficiency, a cochlear implant, or a spinal fluid leak?  Are you on long-term aspirin therapy?   No   Do you have cancer, leukemia, HIV/AIDS, or any other immune system problem?   No   Do you have a parent, brother, or sister with an immune system problem?   No   In the past 3 months, have you taken medications that affect  your immune system, such as prednisone, other steroids, or anticancer drugs; drugs for the treatment of rheumatoid arthritis, Crohn s disease, or psoriasis; or have you had radiation treatments?   No   Have you had a seizure, or a brain or other nervous system problem?   No   During the past year, have you received a transfusion of blood or blood    products, or been given immune (gamma) globulin or antiviral drug?   No   For women: Are you pregnant or is there a chance you could become       pregnant during the next month?   No   Have you received any vaccinations in the past 4 weeks?   No     Immunization questionnaire was positive for at least one answer.  Notified Marcelino Patrick PA-C.        Per orders of  , injection of  given by Julia Lacey LPN. Patient instructed to remain in clinic for 15 minutes afterwards, and to report any adverse reaction to me immediately.       Screening performed by Julia Lacey LPN on 4/1/2020 at 1:47  PM.

## 2020-04-01 NOTE — PATIENT INSTRUCTIONS
Patient Education     Puncture Wound    A puncture wound occurs when a pointed object pushes into the skin. It may go into the tissues below the skin, including fat and muscle. This type of wound is narrow and deep and can be hard to clean. Because of this, puncture wounds are at high risk for becoming infected.  X-rays may be done to check the wound for objects stuck under the skin. Your may also need a tetanus shot. This is given if you are not up-to-date on this vaccination and the object that caused the wound may lead to tetanus.  Home care    Your healthcare provider may prescribe an antibiotic. This is to help prevent infection. Follow all instructions for taking this medicine. Take the medicine every day until it is gone or you are told to stop. You should not have any left over.    The healthcare provider may prescribe medicines for pain. Follow instructions for taking them.    You can take acetaminophen or ibuprofen for pain, unless you were given a different pain medicine to use.     Follow the healthcare provider s instructions on how to care for the wound.    Keep the wound clean and dry. Don't get the wound wet until you are told it is OK to do so. If the area gets wet, gently pat it dry with a clean cloth. Replace the wet bandage with a dry one.    If a bandage was applied and it becomes wet or dirty, replace it. Otherwise, leave it in place for the first 24 hours.    Once you can get the wound wet, you may shower as usual but don't soak the wound in water (no tub baths or swimming)    Even with proper treatment, a puncture wound may become infected. Check the wound daily for signs of infection listed below.  Follow-up care  Follow up with your healthcare provider, or as advised.   When to seek medical advice  Call your healthcare provider right away if any of these occur:    Signs of infection, including:  ? Increasing redness or swelling around the wound  ? Increased warmth of the  wound  ? Worsening pain  ? Red streaking lines away from the wound  ? Draining pus    Fever of 100.4 F (38. C) or higher, or as directed by your healthcare provider    Wound changes colors    Numbness around the wound    Decreased movement around the injured area  Date Last Reviewed: 3/1/2018    [id number] [employer]. [address] [place] [id number]. All rights reserved. This information is not intended as a substitute for professional medical care. Always follow your healthcare professional's instructions.

## 2020-04-01 NOTE — PROGRESS NOTES
Subjective     Dustin Mccoy is a 44 year old male who presents to clinic today for the following health issues:    HPI   Concern - check wound on left hand  Onset: 2 days ago    Description:   wound    Intensity: mild    Progression of Symptoms:  same    Accompanying Signs & Symptoms:  Swelling, had bruising    Previous history of similar problem:   no    Precipitating factors:   Worsened by: nothing    Alleviating factors:  Improved by: warm water    Therapies Tried and outcome: Epson salt soak; good relief     Patient is a 44 year old male who presents today with two concerns, puncture wound to the center of his left palm and increasing symptoms with right inguinal hernia. Patient said that he was making brownies for daughters and forgot to grease the pan. The brownies became stuck and he tried to pry them off with a knife (believed to be a butter knife). When a piece of the brownie broke loose the knife accidentally stabbed into the palm of his left hand. He said that initially it bled quite a bit and that it was painful to move his fingers. He has been soaking the wound in epsom salts each day and notes that the pain has decreased. He is in today as the area has remained tender and is mildly swollen. Denies numbness, tingling, reduced ROM of fingers or wrist, red streaking, discoloration of skin or discharge from wound.     Patient has a right inguinal hernia which was determined to be stable per CT scan earlier this month. He had originally elected to utilize conservative cares and decline option to pursue consult with general surgeon. 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 He provided the anecdote of trying to perform some light lifting which made symptoms worse and trying to use a motor bike and could not lift leg over the seat without  increasing symptoms. At rest he estimates that pain/discomfort is a 2 or less/10. However, with triggering actions such as those listed above or even changing positions while sleeping, the pain/discomfort can increase to 7-8/10. Denies chest pain, trouble breathing, nausea, vomiting, pain or swelling in testicles, radicular pain into legs, new bulges, fever or changes to bladder or bowels.     Reviewed and updated as needed this visit by Provider         Review of Systems   ROS COMP: Constitutional, HEENT, cardiovascular, pulmonary, gi and gu systems are negative, except as otherwise noted.      Objective    /70 (BP Location: Right arm, Patient Position: Chair, Cuff Size: Adult Regular)   Pulse 72   Temp 98  F (36.7  C) (Oral)   Resp 20   Wt 108.4 kg (239 lb)   BMI 33.81 kg/m    Body mass index is 33.81 kg/m .  Physical Exam   GENERAL: healthy, alert and no distress  RESP: lungs clear to auscultation - no rales, rhonchi or wheezes  CV: regular rate and rhythm, normal S1 S2, no S3 or S4, no murmur, click or rub, no peripheral edema and peripheral pulses strong  ABDOMEN: soft, nontender, no hepatosplenomegaly, no masses and bowel sounds normal  MS: no gross musculoskeletal defects noted, no edema, 4/5 strength with resisted flex of left fingers and thumb suspected to be due to pain, normal AROM of fingers, wrist and elbow of left UE  SKIN: 0.5-1cm puncture wound in center of left palm, slight induration to palpation with mild erythema surrounding wound  PSYCH: mentation appears normal, affect normal/bright    Diagnostic Test Results:  Labs reviewed in Epic        Assessment & Plan     1. Right inguinal pain  General surgery referral placed for patient to discuss surgery to correct hernia as it is causing increasing symptoms and interfering with both work and quality of life. Muscle relaxant to reduce pain in order to improve sleep. Cautioned about sedative effects and advised he not use machinery while  taking.   - cyclobenzaprine (FLEXERIL) 10 MG tablet; Take 1 tablet (10 mg) by mouth At Bedtime  Dispense: 30 tablet; Refill: 0  - GENERAL SURG ADULT REFERRAL; Future    2. Puncture wound  Start antibiotic for next 5days. Continue with conservative cares including good hygiene, rest, ice as needed. Reviewed red flag symptoms and sent patient home with educational information.   - levofloxacin (LEVAQUIN) 750 MG tablet; Take 1 tablet (750 mg) by mouth daily for 5 days  Dispense: 5 tablet; Refill: 0      Return in about 6 months (around 10/1/2020).    Marcelino Patrick PA-C  [facility]

## 2020-04-24 ENCOUNTER — TELEPHONE (OUTPATIENT)
Dept: SURGERY | Facility: CLINIC | Age: 45
End: 2020-04-24

## 2020-04-24 NOTE — TELEPHONE ENCOUNTER
Per Dr. Hackett, please change appointment to in person @ 0070 on Monday when patient calls back. He needs to physically examine him to see if hernia has recurred. .  Benedict ROTH CMA

## 2020-04-27 ENCOUNTER — VIRTUAL VISIT (OUTPATIENT)
Dept: SURGERY | Facility: CLINIC | Age: 45
End: 2020-04-27
Attending: PHYSICIAN ASSISTANT
Payer: COMMERCIAL

## 2020-04-27 VITALS — HEIGHT: 71 IN | BODY MASS INDEX: 31.92 KG/M2 | WEIGHT: 228 LBS

## 2020-04-27 DIAGNOSIS — K40.91 RECURRENT RIGHT INGUINAL HERNIA: Primary | ICD-10-CM

## 2020-04-27 DIAGNOSIS — R10.31 RIGHT INGUINAL PAIN: ICD-10-CM

## 2020-04-27 PROCEDURE — 99212 OFFICE O/P EST SF 10 MIN: CPT | Mod: TEL | Performed by: SPECIALIST

## 2020-04-27 ASSESSMENT — MIFFLIN-ST. JEOR: SCORE: 1928.39

## 2020-04-27 ASSESSMENT — PAIN SCALES - GENERAL: PAINLEVEL: NO PAIN (0)

## 2020-04-27 NOTE — PROGRESS NOTES
"Dustin Mccoy is a 44 year old male who is being evaluated via a billable telephone visit.      The patient has been notified of following:     \"This telephone visit will be conducted via a call between you and your physician/provider. We have found that certain health care needs can be provided without the need for a physical exam.  This service lets us provide the care you need with a short phone conversation.  If a prescription is necessary we can send it directly to your pharmacy.  If lab work is needed we can place an order for that and you can then stop by our lab to have the test done at a later time.    Telephone visits are billed at different rates depending on your insurance coverage. During this emergency period, for some insurers they may be billed the same as an in-person visit.  Please reach out to your insurance provider with any questions.    If during the course of the call the physician/provider feels a telephone visit is not appropriate, you will not be charged for this service.\"    Patient has given verbal consent for Telephone visit?  Yes - [phone]    How would you like to obtain your AVS? MyChart     Chief Complaint   Patient presents with     Consult     Right inguinal pain     Referral     Marcelino Patrick PA-C     Patient is a 44-year-old white male who was initially diagnosed with a right inguinal hernia back in 2016.  He had right groin pain at the time.  He had a laparoscopic repair in December 2018 with resolution of his pain.  About 6 months ago the pain returned and is made worse by sitting.  He had a repeat CT done this past March that revealed a recurrent right inguinal hernia for which he now calls in.  Denies any nausea vomiting fevers chills or obstructive symptoms.  He would like to have it repaired again.      CT ABDOMEN AND PELVIS WITH CONTRAST  3/10/2020 1:23 PM     HISTORY: 2016 right inguinal hernia repair. Four to five months of  pain in this area without " identifying trigger, occurring at rest.  Estimated 8/10 at its worst. Right inguinal pain. History of  laparoscopic hernia repair. History of inflammatory bowel syndrome.     TECHNIQUE: Scans obtained from the diaphragm through the pelvis with  IV contrast, 100mL, Isovue 370.      Radiation dose for this scan was reduced using automated exposure  control, adjustment of the mA and/or kV according to patient size, or  iterative reconstruction technique.     COMPARISON: CT abdomen and pelvis dated 5/17/2016.     FINDINGS: Visualized portions of the lung bases and mediastinal  contents are unremarkable. Mild-to-moderate degenerative changes of  the lumbar spine are worst at L1-L2 and at L4-L5. No acute osseous  fractures or aggressive osseous lesions are seen.     The liver, gallbladder, pancreas, spleen, bilateral adrenal glands and  kidneys enhance normally without focal lesion. The liver, gallbladder,  pancreas, spleen, bilateral adrenal glands and bilateral kidneys  enhance normally. No hydronephrosis, nephrolithiasis, hydroureter or  ureteral calculus is identified. Urinary bladder is mostly  decompressed, and there is a mildly thickened urinary bladder wall  likely due to compression.     No adenopathy, free fluid or free air is seen in the peritoneal  cavity. Aorta is normal in appearance.     The colon is grossly of normal caliber. There are scattered  diverticula in the colon. These are mainly seen in the descending and  sigmoid colon. No pericolonic inflammatory change to suggest acute  diverticulitis. Appendix extends in a cephalad direction from the  cecum and is normal in appearance. Small bowel is of normal caliber.  No obvious small bowel abnormality is seen.     There is a fat-containing right inguinal hernia which is similar to  the prior CT dated 5/17/2016. No other hernias are identified.                                                                      IMPRESSION:  1. Stable appearing right  fat-containing inguinal hernia as compared  to the prior study dated 5/17/2016.  2. Mild colonic diverticulosis without evidence for acute  diverticulitis.     XIOMARA PRESLEY MD      Assessment/plan:  There is a 44-year-old gent with recurrent right inguinal hernia.  After discussion with the patient the plan at this time is for an open repair.  The procedure, risks, benefits, and alternatives were discussed and the patient agrees to proceed.  He also understands the need to see him before the procedure to examine him to agree with the telephone findings and he is in agreement.  His symptoms are begin to affect his ability to work as a .  The plan is for an open repair once the COVID restrictions are lifted.  He will follow-up with me before that.  He knows to call if there are any problems or issues.  He also understands there is a 10 to 15% chance repair of the hernia may not relieve his pain.      Phone call duration: 12 minutes    Ed Hackett MD, FACS

## 2020-05-22 ENCOUNTER — TELEPHONE (OUTPATIENT)
Dept: SURGERY | Facility: OTHER | Age: 45
End: 2020-05-22

## 2020-06-03 DIAGNOSIS — Z11.59 ENCOUNTER FOR SCREENING FOR OTHER VIRAL DISEASES: Primary | ICD-10-CM

## 2020-06-03 PROBLEM — R10.31 RIGHT INGUINAL PAIN: Status: ACTIVE | Noted: 2020-06-03

## 2020-06-03 PROBLEM — K40.91 RECURRENT RIGHT INGUINAL HERNIA: Status: ACTIVE | Noted: 2020-06-03

## 2020-06-03 NOTE — TELEPHONE ENCOUNTER
Type of surgery: Open right inguinal hernia repair  Location of surgery: [facility]   Date of surgery: 7/13/20  Surgeon: Dr. Hackett  Pre-Op Appt Date: 7/6/20  Post-Op Appt Date: will schedule when schedule is open   Packet sent out: Surgery packet mailed to patient's home address.   Pre-cert/Authorization completed: NA  Date: 6/3/2020    Alma Rosa Renteria  Surgery Scheduler

## 2020-07-07 ENCOUNTER — OFFICE VISIT (OUTPATIENT)
Dept: FAMILY MEDICINE | Facility: CLINIC | Age: 45
End: 2020-07-07
Payer: COMMERCIAL

## 2020-07-07 VITALS
BODY MASS INDEX: 33.81 KG/M2 | DIASTOLIC BLOOD PRESSURE: 78 MMHG | TEMPERATURE: 96.8 F | WEIGHT: 239 LBS | HEART RATE: 79 BPM | SYSTOLIC BLOOD PRESSURE: 134 MMHG | OXYGEN SATURATION: 98 %

## 2020-07-07 DIAGNOSIS — R10.31 RIGHT INGUINAL PAIN: ICD-10-CM

## 2020-07-07 DIAGNOSIS — Z01.818 PREOP GENERAL PHYSICAL EXAM: Primary | ICD-10-CM

## 2020-07-07 DIAGNOSIS — K40.91 RECURRENT RIGHT INGUINAL HERNIA: ICD-10-CM

## 2020-07-07 PROBLEM — L03.114 CELLULITIS OF LEFT UPPER EXTREMITY: Status: RESOLVED | Noted: 2017-01-02 | Resolved: 2020-07-07

## 2020-07-07 PROBLEM — F32.2 SEVERE SINGLE CURRENT EPISODE OF MAJOR DEPRESSIVE DISORDER, WITHOUT PSYCHOTIC FEATURES (H): Status: RESOLVED | Noted: 2018-05-04 | Resolved: 2020-07-07

## 2020-07-07 PROBLEM — N40.0 PROSTATE HYPERTROPHY: Status: ACTIVE | Noted: 2018-01-25

## 2020-07-07 PROBLEM — F43.22 ADJUSTMENT DISORDER WITH ANXIOUS MOOD: Status: RESOLVED | Noted: 2018-05-04 | Resolved: 2020-07-07

## 2020-07-07 PROBLEM — S60.221D CONTUSION OF RIGHT HAND, SUBSEQUENT ENCOUNTER: Status: RESOLVED | Noted: 2017-11-29 | Resolved: 2020-07-07

## 2020-07-07 PROBLEM — L03.114 CELLULITIS OF LEFT ELBOW: Status: RESOLVED | Noted: 2017-01-02 | Resolved: 2020-07-07

## 2020-07-07 PROBLEM — M70.22 OLECRANON BURSITIS OF LEFT ELBOW: Status: RESOLVED | Noted: 2017-01-02 | Resolved: 2020-07-07

## 2020-07-07 PROCEDURE — 99214 OFFICE O/P EST MOD 30 MIN: CPT | Performed by: FAMILY MEDICINE

## 2020-07-07 NOTE — PATIENT INSTRUCTIONS
Before Your Surgery      Call your surgeon if there is any change in your health. This includes signs of a cold or flu (such as a sore throat, runny nose, cough, rash or fever).    Do not smoke, drink alcohol or take over the counter medicine (unless your surgeon or primary care doctor tells you to) for the 24 hours before and after surgery.    If you take prescribed drugs: Follow your doctor s orders about which medicines to take and which to stop until after surgery.    Eating and drinking prior to surgery: follow the instructions from your surgeon    Take a shower or bath the night before surgery. Use the soap your surgeon gave you to gently clean your skin. If you do not have soap from your surgeon, use your regular soap. Do not shave or scrub the surgery site.  Wear clean pajamas and have clean sheets on your bed.         You are not taking any medication chronically  No aspirin or NSAID (Ibuprofen, Aleve, Motrin, Naproxen, ect.) for 5 days before the procedure   Nothing to eat or drink for 8 hrs before the procedure.

## 2020-07-07 NOTE — PROGRESS NOTES
[address] [address]  [phone]  Dept: [phone]    PRE-OP EVALUATION:  Today's date: 2020    Dustin Mccoy (: 1975) presents for pre-operative evaluation assessment as requested by Dr. Hackett.  He requires evaluation and anesthesia risk assessment prior to undergoing surgery/procedure for treatment of Open right inguinal hernia repair  .    Proposed Surgery/ Procedure: Open right inguinal hernia repair  Date of Surgery/ Procedure: 2020  Time of Surgery/ Procedure: not sure yet  Hospital/Surgical Facility: [facility]    Primary Physician: Marcelino Patrick  Type of Anesthesia Anticipated: General    Patient has a Health Care Directive or Living Will:  NO    1. NO - Do you have a history of heart attack, stroke, stent, bypass or surgery on an artery in the head, neck, heart or legs?  2. NO - Do you ever have any pain or discomfort in your chest?  3. NO - Do you have a history of  Heart Failure?  4. NO - Are you troubled by shortness of breath when: walking on the level, up a slight hill or at night?  5. NO - Do you currently have a cold, bronchitis or other respiratory infection?  6. NO - Do you have a cough, shortness of breath or wheezing?  7. NO - Do you sometimes get pains in the calves of your legs when you walk?  8. NO - Do you or anyone in your family have previous history of blood clots?  9. NO - Do you or does anyone in your family have a serious bleeding problem such as prolonged bleeding following surgeries or cuts?  10. NO - Have you ever had problems with anemia or been told to take iron pills?  11. NO - Have you had any abnormal blood loss such as black, tarry or bloody stools, or abnormal vaginal bleeding?  12. NO - Have you ever had a blood transfusion?  13. NO - Have you or any of your relatives ever had problems with anesthesia?  14. NO - Do you have sleep apnea, excessive snoring or daytime  drowsiness?  15. NO - Do you have any prosthetic heart valves?  16. NO - Do you have prosthetic joints?  17. NO - Is there any chance that you may be pregnant?      HPI:     HPI related to upcoming procedure:     Dustin is here today for pre-op physical. He is scheduled for right open inguinal hernia repair on 7/13/2020 with Dr. Hackett at [facility] for recurrent inguinal hernia.  Had it repaired about 2 years ago and the pain is coming back.  It is expected to be the same day procedure with general anesthesia. No personal or family history of anesthesia complication or pre-matured CAD or MI.  Has not been on steroid orally in the last 6 months.  Not taking Aspirin or other form of blood thinner.  Last dose of NSAID was couple days ago.       Generally is healthy -not taking any medication chronically.  Active medical problems and medication reviewed and updated.    He generally is doing well and has no concern today. No headache or dizziness. No URI symptoms include running nose, nasal congestion, ST, coughing, fever or chill.  No chest pain or SOB.  No N/V/D/C and denied of having problem with urination.  He never smoke and denied of having breathing problem.  No history of asthma or COPD.      MEDICAL HISTORY:     Patient Active Problem List    Diagnosis Date Noted     Recurrent right inguinal hernia 06/03/2020     Priority: Medium     Added automatically from request for surgery [id number]       Prostate hypertrophy 01/25/2018     Priority: Medium     H/O irritable bowel syndrome 12/28/2015     Priority: Medium      Past Medical History:   Diagnosis Date     Atypical chest pain 12/28/2015     H/O irritable bowel syndrome 12/28/2015     Past Surgical History:   Procedure Laterality Date     LAPAROSCOPIC HERNIORRHAPHY INGUINAL Right 12/17/2018    Procedure: Laparoscopic Right Inguinal Hernia Repair;  Surgeon: Skylar Dudley MD;  Location: [facility] OR     NO HISTORY OF SURGERY       Current Outpatient Medications    Medication Sig Dispense Refill     cyclobenzaprine (FLEXERIL) 10 MG tablet Take 1 tablet (10 mg) by mouth At Bedtime 30 tablet 0     ibuprofen (ADVIL/MOTRIN) 200 MG capsule Take 200 mg by mouth every 4 hours as needed for fever       OTC products: None, except as noted above    Allergies   Allergen Reactions     Bactrim [Sulfamethoxazole W/Trimethoprim]      Erythromycin      Sulfa Drugs       Latex Allergy: NO    Social History     Tobacco Use     Smoking status: Never Smoker     Smokeless tobacco: Never Used   Substance Use Topics     Alcohol use: Yes     Comment: occasional     History   Drug Use No       REVIEW OF SYSTEMS:   Constitutional, neuro, ENT, endocrine, pulmonary, cardiac, gastrointestinal, genitourinary, musculoskeletal, integument and psychiatric systems are negative, except as otherwise noted.    EXAM:   /78   Pulse 79   Temp 96.8  F (36  C) (Temporal)   Wt 108.4 kg (239 lb)   SpO2 98%   BMI 33.81 kg/m        GENERAL APPEARANCE: healthy, alert and no distress     EYES: EOMI,- PERRL     HENT: ear canals and TM's normal. Nares are non-congested. Oropharynx is pink and moist, no ulcers or lesions. No tender with palpation to the sinuses.     NECK: no adenopathy, no asymmetry.  No tender with palpation to the cervical spine and its para-spinous muscle bilaterally.     RESP: lungs clear to auscultation - no rales, rhonchi or wheezes     CV: regular rates and rhythm and no murmur.     ABDOMEN:  soft, nontender, nondistended with no palpable masses and bowel sounds normal     MS: extremities normal- no gross deformities noted.  No edema.  All 4 extremities are equally in strength.     NEURO: Normal strength and tone,  mentation intact and speech normal.  No focal neurological deficit     PSYCH: mentation appears normal. and affect normal/bright           DIAGNOSTICS:   EKG: Not indicated due to non-vascular surgery and low risk of event (age <65 and without cardiac risk factors)    Recent Labs    Lab Test 05/29/19  1020 12/05/18  0803 01/03/17  0521   HGB 15.2 16.3 13.2*     --  209     --  143   POTASSIUM 4.3  --  3.8   CR 0.93  --  0.91        IMPRESSION:   Reason for surgery/procedure: right inguinal pain with recurrent inguinal hernia  Diagnosis/reason for consult: pre-op physical to evaluate for anesthesia and its pascual-operative risks.    The proposed surgical procedure is considered INTERMEDIATE risk.    REVISED CARDIAC RISK INDEX  The patient has the following serious cardiovascular risks for perioperative complications such as (MI, PE, VFib and 3  AV Block):  No serious cardiac risks  INTERPRETATION: 0 risks: Class I (very low risk - 0.4% complication rate)    The patient has the following additional risks for perioperative complications:  No identified additional risks      ICD-10-CM    1. Preop general physical exam  Z01.818    2. Recurrent right inguinal hernia  K40.91    3. Right inguinal pain  R10.31        RECOMMENDATIONS:     --No hx of DVT or PE history, PE/DVT prophylaxis per surgeon's recommendation/desire.    Cardiovascular Risk  No cardiovascular risks identified.       Pulmonary Risk  No pulmonary risks identified.  Never been a smoker.  No history of asthma or COPD.  Recommended oxygen to maintain O2 sat above 95% during and after the procedure.      Obstructive Sleep Apnea (or suspected sleep apnea)  No history of sleep apnea.  No risk for sleep apnea identified.      Anemia  No history of anemia or blood transfusion.  He is okay to be transfused if necessary.      --Patient is to take all scheduled medications on the day of surgery EXCEPT for modifications listed below.   You are not taking any medication chronically  No aspirin or NSAID (Ibuprofen, Aleve, Motrin, Naproxen, ect.) for 5 days before the procedure   Nothing to eat or drink for 8 hrs before the procedure.       APPROVAL GIVEN to proceed with proposed procedure, without further diagnostic evaluation        Signed Electronically by: Malachi Lloyd Mai, MD    Copy of this evaluation report is provided to requesting physician.    [place] Preop Guidelines    Revised Cardiac Risk Index

## 2020-07-10 DIAGNOSIS — Z11.59 ENCOUNTER FOR SCREENING FOR OTHER VIRAL DISEASES: ICD-10-CM

## 2020-07-10 PROBLEM — R10.31 RIGHT INGUINAL PAIN: Status: RESOLVED | Noted: 2020-06-03 | Resolved: 2020-07-10

## 2020-07-10 PROBLEM — R10.31 RIGHT INGUINAL PAIN: Status: ACTIVE | Noted: 2020-06-03

## 2020-07-10 PROCEDURE — U0003 INFECTIOUS AGENT DETECTION BY NUCLEIC ACID (DNA OR RNA); SEVERE ACUTE RESPIRATORY SYNDROME CORONAVIRUS 2 (SARS-COV-2) (CORONAVIRUS DISEASE [COVID-19]), AMPLIFIED PROBE TECHNIQUE, MAKING USE OF HIGH THROUGHPUT TECHNOLOGIES AS DESCRIBED BY CMS-2020-01-R: HCPCS | Performed by: SPECIALIST

## 2020-07-11 LAB
SARS-COV-2 RNA SPEC QL NAA+PROBE: NOT DETECTED
SPECIMEN SOURCE: NORMAL

## 2020-07-12 ENCOUNTER — ANESTHESIA EVENT (OUTPATIENT)
Dept: SURGERY | Facility: CLINIC | Age: 45
End: 2020-07-12
Payer: COMMERCIAL

## 2020-07-13 ENCOUNTER — ANESTHESIA (OUTPATIENT)
Dept: SURGERY | Facility: CLINIC | Age: 45
End: 2020-07-13
Payer: COMMERCIAL

## 2020-07-13 ENCOUNTER — HOSPITAL ENCOUNTER (OUTPATIENT)
Facility: CLINIC | Age: 45
Discharge: HOME | End: 2020-07-13
Attending: SPECIALIST | Admitting: SPECIALIST
Payer: COMMERCIAL

## 2020-07-13 VITALS
TEMPERATURE: 98.1 F | SYSTOLIC BLOOD PRESSURE: 140 MMHG | DIASTOLIC BLOOD PRESSURE: 88 MMHG | RESPIRATION RATE: 14 BRPM | OXYGEN SATURATION: 97 % | HEART RATE: 73 BPM

## 2020-07-13 DIAGNOSIS — G89.18 POST-OP PAIN: Primary | ICD-10-CM

## 2020-07-13 DIAGNOSIS — K40.91 RECURRENT RIGHT INGUINAL HERNIA: ICD-10-CM

## 2020-07-13 DIAGNOSIS — R10.31 RIGHT INGUINAL PAIN: ICD-10-CM

## 2020-07-13 PROCEDURE — 25000128 H RX IP 250 OP 636

## 2020-07-13 PROCEDURE — 37000008 ZZH ANESTHESIA TECHNICAL FEE, 1ST 30 MIN: Performed by: SPECIALIST

## 2020-07-13 PROCEDURE — 88304 TISSUE EXAM BY PATHOLOGIST: CPT | Mod: 26 | Performed by: SPECIALIST

## 2020-07-13 PROCEDURE — C1781 MESH (IMPLANTABLE): HCPCS | Performed by: SPECIALIST

## 2020-07-13 PROCEDURE — 25000128 H RX IP 250 OP 636: Performed by: SPECIALIST

## 2020-07-13 PROCEDURE — 25000125 ZZHC RX 250: Performed by: SPECIALIST

## 2020-07-13 PROCEDURE — 88304 TISSUE EXAM BY PATHOLOGIST: CPT | Performed by: SPECIALIST

## 2020-07-13 PROCEDURE — 25000125 ZZHC RX 250

## 2020-07-13 PROCEDURE — 37000009 ZZH ANESTHESIA TECHNICAL FEE, EACH ADDTL 15 MIN: Performed by: SPECIALIST

## 2020-07-13 PROCEDURE — 71000014 ZZH RECOVERY PHASE 1 LEVEL 2 FIRST HR: Performed by: SPECIALIST

## 2020-07-13 PROCEDURE — 40000306 ZZH STATISTIC PRE PROC ASSESS II: Performed by: SPECIALIST

## 2020-07-13 PROCEDURE — 71000027 ZZH RECOVERY PHASE 2 EACH 15 MINS: Performed by: SPECIALIST

## 2020-07-13 PROCEDURE — 36000052 ZZH SURGERY LEVEL 2 EA 15 ADDTL MIN: Performed by: SPECIALIST

## 2020-07-13 PROCEDURE — 27110028 ZZH OR GENERAL SUPPLY NON-STERILE: Performed by: SPECIALIST

## 2020-07-13 PROCEDURE — 36000050 ZZH SURGERY LEVEL 2 1ST 30 MIN: Performed by: SPECIALIST

## 2020-07-13 PROCEDURE — 25000566 ZZH SEVOFLURANE, EA 15 MIN: Performed by: SPECIALIST

## 2020-07-13 PROCEDURE — 49520 REREPAIR ING HERNIA REDUCE: CPT | Mod: RT | Performed by: SPECIALIST

## 2020-07-13 PROCEDURE — 25000128 H RX IP 250 OP 636: Performed by: NURSE ANESTHETIST, CERTIFIED REGISTERED

## 2020-07-13 PROCEDURE — 25000132 ZZH RX MED GY IP 250 OP 250 PS 637: Performed by: SPECIALIST

## 2020-07-13 PROCEDURE — 25800030 ZZH RX IP 258 OP 636: Performed by: NURSE ANESTHETIST, CERTIFIED REGISTERED

## 2020-07-13 PROCEDURE — 27210794 ZZH OR GENERAL SUPPLY STERILE: Performed by: SPECIALIST

## 2020-07-13 PROCEDURE — 25000125 ZZHC RX 250: Performed by: NURSE ANESTHETIST, CERTIFIED REGISTERED

## 2020-07-13 DEVICE — MESH PROGRIP 4.7X3" PARIETEX RIGHT TEM1208GR: Type: IMPLANTABLE DEVICE | Site: INGUINAL | Status: FUNCTIONAL

## 2020-07-13 RX ORDER — HYDROMORPHONE HYDROCHLORIDE 1 MG/ML
.3-.5 INJECTION, SOLUTION INTRAMUSCULAR; INTRAVENOUS; SUBCUTANEOUS EVERY 10 MIN PRN
Status: DISCONTINUED | OUTPATIENT
Start: 2020-07-13 | End: 2020-07-13 | Stop reason: HOSPADM

## 2020-07-13 RX ORDER — ONDANSETRON 4 MG/1
4 TABLET, ORALLY DISINTEGRATING ORAL EVERY 30 MIN PRN
Status: DISCONTINUED | OUTPATIENT
Start: 2020-07-13 | End: 2020-07-13 | Stop reason: HOSPADM

## 2020-07-13 RX ORDER — NALOXONE HYDROCHLORIDE 0.4 MG/ML
.1-.4 INJECTION, SOLUTION INTRAMUSCULAR; INTRAVENOUS; SUBCUTANEOUS
Status: DISCONTINUED | OUTPATIENT
Start: 2020-07-13 | End: 2020-07-13 | Stop reason: HOSPADM

## 2020-07-13 RX ORDER — FENTANYL CITRATE 50 UG/ML
INJECTION, SOLUTION INTRAMUSCULAR; INTRAVENOUS PRN
Status: DISCONTINUED | OUTPATIENT
Start: 2020-07-13 | End: 2020-07-13

## 2020-07-13 RX ORDER — FENTANYL CITRATE 50 UG/ML
25-50 INJECTION, SOLUTION INTRAMUSCULAR; INTRAVENOUS
Status: DISCONTINUED | OUTPATIENT
Start: 2020-07-13 | End: 2020-07-13 | Stop reason: HOSPADM

## 2020-07-13 RX ORDER — OXYCODONE AND ACETAMINOPHEN 5; 325 MG/1; MG/1
2 TABLET ORAL
Status: COMPLETED | OUTPATIENT
Start: 2020-07-13 | End: 2020-07-13

## 2020-07-13 RX ORDER — DIMENHYDRINATE 50 MG/ML
25 INJECTION, SOLUTION INTRAMUSCULAR; INTRAVENOUS
Status: DISCONTINUED | OUTPATIENT
Start: 2020-07-13 | End: 2020-07-13 | Stop reason: HOSPADM

## 2020-07-13 RX ORDER — BUPIVACAINE HYDROCHLORIDE AND EPINEPHRINE 2.5; 5 MG/ML; UG/ML
INJECTION, SOLUTION INFILTRATION; PERINEURAL PRN
Status: DISCONTINUED | OUTPATIENT
Start: 2020-07-13 | End: 2020-07-13 | Stop reason: HOSPADM

## 2020-07-13 RX ORDER — CEFAZOLIN SODIUM 2 G/100ML
2 INJECTION, SOLUTION INTRAVENOUS
Status: COMPLETED | OUTPATIENT
Start: 2020-07-13 | End: 2020-07-13

## 2020-07-13 RX ORDER — CEFAZOLIN SODIUM 1 G/3ML
1 INJECTION, POWDER, FOR SOLUTION INTRAMUSCULAR; INTRAVENOUS SEE ADMIN INSTRUCTIONS
Status: DISCONTINUED | OUTPATIENT
Start: 2020-07-13 | End: 2020-07-13 | Stop reason: HOSPADM

## 2020-07-13 RX ORDER — SODIUM CHLORIDE, SODIUM LACTATE, POTASSIUM CHLORIDE, CALCIUM CHLORIDE 600; 310; 30; 20 MG/100ML; MG/100ML; MG/100ML; MG/100ML
INJECTION, SOLUTION INTRAVENOUS CONTINUOUS
Status: DISCONTINUED | OUTPATIENT
Start: 2020-07-13 | End: 2020-07-13 | Stop reason: HOSPADM

## 2020-07-13 RX ORDER — KETOROLAC TROMETHAMINE 30 MG/ML
INJECTION, SOLUTION INTRAMUSCULAR; INTRAVENOUS PRN
Status: DISCONTINUED | OUTPATIENT
Start: 2020-07-13 | End: 2020-07-13

## 2020-07-13 RX ORDER — ONDANSETRON 2 MG/ML
4 INJECTION INTRAMUSCULAR; INTRAVENOUS EVERY 30 MIN PRN
Status: DISCONTINUED | OUTPATIENT
Start: 2020-07-13 | End: 2020-07-13 | Stop reason: HOSPADM

## 2020-07-13 RX ORDER — OXYCODONE AND ACETAMINOPHEN 5; 325 MG/1; MG/1
1-2 TABLET ORAL EVERY 6 HOURS PRN
Qty: 12 TABLET | Refills: 0 | Status: SHIPPED | OUTPATIENT
Start: 2020-07-13

## 2020-07-13 RX ORDER — PROPOFOL 10 MG/ML
INJECTION, EMULSION INTRAVENOUS PRN
Status: DISCONTINUED | OUTPATIENT
Start: 2020-07-13 | End: 2020-07-13

## 2020-07-13 RX ORDER — MEPERIDINE HYDROCHLORIDE 25 MG/ML
12.5 INJECTION INTRAMUSCULAR; INTRAVENOUS; SUBCUTANEOUS
Status: DISCONTINUED | OUTPATIENT
Start: 2020-07-13 | End: 2020-07-13 | Stop reason: HOSPADM

## 2020-07-13 RX ORDER — ONDANSETRON 2 MG/ML
INJECTION INTRAMUSCULAR; INTRAVENOUS PRN
Status: DISCONTINUED | OUTPATIENT
Start: 2020-07-13 | End: 2020-07-13

## 2020-07-13 RX ORDER — EPHEDRINE SULFATE 50 MG/ML
INJECTION, SOLUTION INTRAMUSCULAR; INTRAVENOUS; SUBCUTANEOUS PRN
Status: DISCONTINUED | OUTPATIENT
Start: 2020-07-13 | End: 2020-07-13

## 2020-07-13 RX ORDER — LIDOCAINE HYDROCHLORIDE 20 MG/ML
INJECTION, SOLUTION INFILTRATION; PERINEURAL PRN
Status: DISCONTINUED | OUTPATIENT
Start: 2020-07-13 | End: 2020-07-13

## 2020-07-13 RX ADMIN — FENTANYL CITRATE 50 MCG: 50 INJECTION INTRAMUSCULAR; INTRAVENOUS at 09:22

## 2020-07-13 RX ADMIN — Medication 10 MG: at 08:25

## 2020-07-13 RX ADMIN — LIDOCAINE HYDROCHLORIDE 1 ML: 10 INJECTION, SOLUTION EPIDURAL; INFILTRATION; INTRACAUDAL; PERINEURAL at 06:39

## 2020-07-13 RX ADMIN — FENTANYL CITRATE 50 MCG: 50 INJECTION, SOLUTION INTRAMUSCULAR; INTRAVENOUS at 07:33

## 2020-07-13 RX ADMIN — FENTANYL CITRATE 50 MCG: 50 INJECTION, SOLUTION INTRAMUSCULAR; INTRAVENOUS at 07:52

## 2020-07-13 RX ADMIN — FENTANYL CITRATE 50 MCG: 50 INJECTION INTRAMUSCULAR; INTRAVENOUS at 09:30

## 2020-07-13 RX ADMIN — OXYCODONE HYDROCHLORIDE AND ACETAMINOPHEN 2 TABLET: 5; 325 TABLET ORAL at 10:45

## 2020-07-13 RX ADMIN — Medication 5 MG: at 07:48

## 2020-07-13 RX ADMIN — FENTANYL CITRATE 50 MCG: 50 INJECTION INTRAMUSCULAR; INTRAVENOUS at 09:39

## 2020-07-13 RX ADMIN — KETOROLAC TROMETHAMINE 30 MG: 30 INJECTION, SOLUTION INTRAMUSCULAR at 08:38

## 2020-07-13 RX ADMIN — PROPOFOL 200 MG: 10 INJECTION, EMULSION INTRAVENOUS at 07:36

## 2020-07-13 RX ADMIN — FENTANYL CITRATE 50 MCG: 50 INJECTION INTRAMUSCULAR; INTRAVENOUS at 09:17

## 2020-07-13 RX ADMIN — SODIUM CHLORIDE, POTASSIUM CHLORIDE, SODIUM LACTATE AND CALCIUM CHLORIDE: 600; 310; 30; 20 INJECTION, SOLUTION INTRAVENOUS at 06:39

## 2020-07-13 RX ADMIN — CEFAZOLIN SODIUM 2 G: 2 INJECTION, SOLUTION INTRAVENOUS at 07:40

## 2020-07-13 RX ADMIN — Medication 10 MG: at 08:06

## 2020-07-13 RX ADMIN — MIDAZOLAM 2 MG: 1 INJECTION INTRAMUSCULAR; INTRAVENOUS at 07:27

## 2020-07-13 RX ADMIN — LIDOCAINE HYDROCHLORIDE 100 MG: 20 INJECTION, SOLUTION INFILTRATION; PERINEURAL at 07:36

## 2020-07-13 RX ADMIN — ONDANSETRON 4 MG: 2 INJECTION INTRAMUSCULAR; INTRAVENOUS at 08:37

## 2020-07-13 NOTE — ANESTHESIA POSTPROCEDURE EVALUATION
Patient: Dustin Mccoy    Procedure(s):  Open right inguinal hernia repair    Diagnosis:Recurrent right inguinal hernia [K40.91]  Right inguinal pain [R10.31]  Diagnosis Additional Information: No value filed.    Anesthesia Type:  General    Note:  Anesthesia Post Evaluation    Patient location during evaluation: Phase 2 and Bedside  Patient participation: Able to fully participate in evaluation  Level of consciousness: awake  Pain management: adequate  Airway patency: patent  Cardiovascular status: acceptable and hemodynamically stable  Respiratory status: acceptable, room air and nonlabored ventilation  Hydration status: stable  PONV: none     Anesthetic complications: None    Comments: Patient was happy with the anesthesia care received and no anesthesia related complications were noted.  I will follow up with the patient again if it is needed.        Last vitals:  Vitals:    07/13/20 0940 07/13/20 0945 07/13/20 0950   BP: 134/87 (!) 131/98    Pulse: 66 70    Resp: 14 14    Temp:      SpO2: 96%  92%         Electronically Signed By: THANH Riddle CRNA  July 13, 2020  10:12 AM

## 2020-07-13 NOTE — ANESTHESIA PREPROCEDURE EVALUATION
Anesthesia Pre-Procedure Evaluation    Patient: Dustni Mccoy   MRN: [id number] : 1975          Preoperative Diagnosis: Recurrent right inguinal hernia [K40.91]  Right inguinal pain [R10.31]    Procedure(s):  Open right inguinal hernia repair    Past Medical History:   Diagnosis Date     Atypical chest pain 2015     H/O irritable bowel syndrome 2015     Past Surgical History:   Procedure Laterality Date     LAPAROSCOPIC HERNIORRHAPHY INGUINAL Right 2018    Procedure: Laparoscopic Right Inguinal Hernia Repair;  Surgeon: Skylar Dudley MD;  Location:  OR       Anesthesia Evaluation     . Pt has had prior anesthetic. Type: General    No history of anesthetic complications          ROS/MED HX    ENT/Pulmonary:  - neg pulmonary ROS     Neurologic:  - neg neurologic ROS     Cardiovascular: Comment: Atypical chest pain, - stress echo was normal, no problems since    (+) ----. : . . . :. . Previous cardiac testing Echodate:12/28/15results:BP: 140/78 mmHg  ______________________________________________________________________________        Procedure  Stress Echo Complete. Contrast Definity.  ______________________________________________________________________________     Interpretation Summary  THIS IS A NORMAL STRESS ECHOCARDIOGRAM. Exercise and EKG portion reported  separately. The study was technically difficult. Definity contrast was used  without apparent complications. There is no comparison study available.  ______________________________________________________________________________     Stress  Exercise and EKG portion reported separately.  The visual ejection fraction is estimated at >70%.  Left ventricular cavity size decreases with exercise.  Global LV systolic function augments with exercise.  Normal resting wall motion and no stress-induced wall motion abnormality.     Baseline  No regional wall motion abnormalities noted.  The visual ejection fraction is estimated at  55-60%.     Stress Results      Protocol: MN HIWOT         Maximum Predicted HR:  178 bpm           Target HR:  151 bpm        % Maximum Predicted HR:   96 %                   +---------+--------+----------+------+---------+                :  Stage  :Duration:Heart Rate:  BPCom    ment :                :         : (mm:ss):   (bpm)  :      :         :                +---------+--------+----------+------+---------+                : Stage 1 :  3:00 96          :138/74:         :                +---------+--------+----------+------+---------+                : Stage 2 :  3:00 10      6   :144/74:         :                +---------+--------+----------+------+---------+                : Stage 3 :  3:00 13      9   :174/74:         :                +---------+--------+----------+------+---------+                : Stage 4 :  2:42 17      1   :180/74:RPP 47742:                +---------+--------+----------+------+---------+                :RECOVERY :  7:39 10      0   :136/68:         :                +---------+--------+----------+------+---------+            Stress Duration:  11:42 mm:ss *        Recovery Time: 7:39 mm:ss        Maximum Stress HR:    171 bpmMETS               :          14        Tricuspid Valve  The tricuspid valve is not well visualized. No tricuspid regurgitation.     Aortic Valve  The aortic valve is not well visualized. No aortic regurgitation is present.  No aortic stenosis is present.  date: results:ECG reviewed date:12/28/15 results:NSR- septal waves nonspecific date: results:          METS/Exercise Tolerance:     Hematologic:  - neg hematologic  ROS       Musculoskeletal:  - neg musculoskeletal ROS       GI/Hepatic: Comment: Irritable bowel syndrome        (-) GERD   Renal/Genitourinary:  - ROS Renal section negative       Endo:  - neg endo ROS       Psychiatric:     (+) psychiatric history anxiety and depression      Infectious Disease:  - neg infectious disease ROS       Malignancy:      - no  "malignancy   Other:    - neg other ROS                      Physical Exam  Normal systems: cardiovascular and pulmonary    Airway   Mallampati: II  TM distance: >3 FB  Neck ROM: full    Dental     Cardiovascular   Rhythm and rate: regular and normal      Pulmonary    breath sounds clear to auscultation            Lab Results   Component Value Date    WBC 5.8 05/29/2019    HGB 15.2 05/29/2019    HCT 45.7 05/29/2019     05/29/2019    CRP 32.5 (H) 01/02/2017     05/29/2019    POTASSIUM 4.3 05/29/2019    CHLORIDE 105 05/29/2019    CO2 31 05/29/2019    BUN 13 05/29/2019    CR 0.93 05/29/2019    GLC 92 05/29/2019    SELVIN 8.8 05/29/2019       Preop Vitals  BP Readings from Last 3 Encounters:   07/07/20 134/78   04/01/20 118/70   03/09/20 122/76    Pulse Readings from Last 3 Encounters:   07/07/20 79   04/01/20 72   03/09/20 72      Resp Readings from Last 3 Encounters:   04/01/20 20   03/09/20 16   05/29/19 16    SpO2 Readings from Last 3 Encounters:   07/07/20 98%   03/09/20 96%   05/29/19 100%      Temp Readings from Last 1 Encounters:   07/07/20 96.8  F (36  C) (Temporal)    Ht Readings from Last 1 Encounters:   04/27/20 1.791 m (5' 10.5\")      Wt Readings from Last 1 Encounters:   07/07/20 108.4 kg (239 lb)    Estimated body mass index is 33.81 kg/m  as calculated from the following:    Height as of 4/27/20: 1.791 m (5' 10.5\").    Weight as of 7/7/20: 108.4 kg (239 lb).       Anesthesia Plan      History & Physical Review  History and physical reviewed and following examination; no interval change.    ASA Status:  2 .    NPO Status:  > 8 hours    Plan for General with Propofol induction. Maintenance will be Balanced.    PONV prophylaxis:  Ondansetron (or other 5HT-3) and Dexamethasone or Solumedrol         Postoperative Care  Postoperative pain management:  IV analgesics and Oral pain medications.      Consents  Anesthetic plan, risks, benefits and alternatives discussed with:  Patient..           "       Gibran Martini, APRN CRNA

## 2020-07-13 NOTE — OP NOTE
Procedure Date: 07/13/2020      PREOPERATIVE DIAGNOSES:     1.  Recurrent right inguinal hernia.   2.  Right inguinal pain.      POSTOPERATIVE DIAGNOSES:     1.  Recurrent right inguinal hernia.   2.  Right inguinal pain.      PROCEDURE:  Open recurrent right inguinal hernia repair with Parietex ProGrip keyhole mesh.      SURGEON:  Ed Hackett MD, [facility]      ANESTHESIA:  LMA.      INDICATIONS FOR PROCEDURE:  This is a 45-year-old gentleman who had a laparoscopic right inguinal hernia repair done in 2018.  His groin pain then returned, and a subsequent CT revealed a large right inguinal hernia.  For this reason, it was elected to take him to the operating room for repair.      OPERATIVE FINDINGS:  Included a large lipoma of the cord.      DETAILS OF PROCEDURE:  With cooperation of the patient in the preoperative holding area, the hernia was palpated at that time.  He was then taken to the operating room.  After induction of general anesthesia, the abdomen was prepped and draped in sterile fashion.  A timeout was performed confirming the identity of the patient, as well as the procedure to be performed.  An incision was then made 2 fingerbreadths above the inguinal ligament.  Subcutaneous tissue was opened using cautery.  Yesi's fascia was opened using cautery.  The external oblique fascia was identified, opened using a 15 blade and Metzenbaum scissors down to the external ring.  We then dissected back to the shelving edge inferiorly and conjoined tendon superiorly.  A large lipoma of the cord was seen.  We then dissected and got circumferential control around the cord at the pubic tubercle, and this was controlled with a Penrose drain.  The cord was then skeletonized, clearly identifying a large lipoma of the cord.  It was too large to return the preperitoneal space, so it was clamped with right angle clamps and taken off with cautery and then the right angle clamps were tied off using 3-0 Vicryl.  The  remaining adipose tissue was returned to the preperitoneal space.  The defect was partially closed using a 3-0 Vicryl to ease placement of the mesh.  A piece of Parietex keyhole mesh was cut to an appropriate size, placed into the space.  It was then sutured medially to the pubic tubercle using 2-0 Prolene and in a running fashion along the shelving edge inferiorly and the conjoined tendon superiorly.  Also, during this dissection, it was elected to transect the ilioinguinal nerve to prevent chronic pain from irritation from the mesh.  This was clamped with a right angle clamp and ligated using a 3-0 Vicryl tie.  This was done in an effort to reduce possible chronic pain from the mesh.  The area was copiously irrigated.  All fluid was suctioned out.  The external oblique fascia was then closed using a  running #1 Vicryl.  Subcutaneous tissue was reapproximated with 3-0 Vicryl.  Skin was closed using a running 4-0 Monocryl subcuticular stitch, and the incision was then further closed using Exofin glue.  Sterile dressings were applied.  The patient was taken from the operating room to the recovery room in stable condition to be sent home.         CJ HERNANDEZ MD, FACS             D: 2020   T: 2020   MT: RADHA      Name:     YARA UP   MRN:      [id number]        Account:        [id number]   :      1975           Procedure Date: 2020      Document: [id number]

## 2020-07-13 NOTE — ANESTHESIA CARE TRANSFER NOTE
Patient: Dustin Mccoy    Procedure(s):  Open right inguinal hernia repair    Diagnosis: Recurrent right inguinal hernia [K40.91]  Right inguinal pain [R10.31]  Diagnosis Additional Information: No value filed.    Anesthesia Type:   General     Note:  Airway :Oral Airway and Face Mask  Patient transferred to:PACU  Handoff Report: Identifed the Patient, Identified the Reponsible Provider, Reviewed the pertinent medical history, Discussed the surgical course, Reviewed Intra-OP anesthesia mangement and issues during anesthesia, Set expectations for post-procedure period and Allowed opportunity for questions and acknowledgement of understanding      Vitals: (Last set prior to Anesthesia Care Transfer)    CRNA VITALS  7/13/2020 0826 - 7/13/2020 0903      7/13/2020             Pulse:  65    SpO2:  99 %    Resp Rate (observed):  (!) 5                Electronically Signed By: THANH Riddle CRNA  July 13, 2020  9:03 AM

## 2020-07-13 NOTE — DISCHARGE INSTRUCTIONS
[facility] Same-Day Surgery   Adult Discharge Orders & Instructions     For 24 hours after surgery    1. Get plenty of rest.  A responsible adult must stay with you for at least 24 hours after you leave the hospital.   2. Do not drive or use heavy equipment.  If you have weakness or tingling, don't drive or use heavy equipment until this feeling goes away.  3. Do not drink alcohol.  4. Avoid strenuous or risky activities.  Ask for help when climbing stairs.   5. You may feel lightheaded.  If so, sit for a few minutes before standing.  Have someone help you get up.   6. You may have a slight fever. Call the doctor if your fever is over 100 F (37.7 C) (taken under the tongue) or lasts longer than 24 hours.  7. You may have a dry mouth, a sore throat, muscle aches or trouble sleeping.  These should go away after 24 hours.  8. Do not make important or legal decisions.  We don t expect you to have any problems from the surgery or treatment you had today. Just in case, here s what to do if you have pain, upset stomach (nausea), bleeding or infection:  Pain:  Take medicines your doctor has prescribed or over-the-counter medicine they have suggested. Resting and using ice packs can help, too. For surgery on an arm or leg, raise it on a pillow to ease swelling. Call your doctor if these methods don t work.  Copyright [employer], Licensed under [employer]  Upset stomach (nausea):  Take anti-nausea medicine approved by your doctor. Drink clear liquids like apple juice, ginger ale, broth or 7-Up. Be sure to drink enough fluids. Rest can help, too. Move to normal foods when you re ready. Bleeding:  In the first 24 hours, you may see a little blood on your dressing, about the size of a quarter. You don t need to worry about this much blood, but if the blood spot keeps getting bigger:    Put pressure on the wound if you can, AND    Call your doctor.  Copyright [employer], Licensed under [employer]  International  Fever/Infection: Please call your doctor if you have any of these signs:    Redness    Swelling    Wound feels warm    Pain gets worse    Bad-smelling fluid leaks from wound    Fever or chills  Call your doctor for any of the followin.  It has been over 8 to 10 hours since surgery and you are still not able to urinate (pass water).    2.  Headache for over 24 hours.    3.  Numbness, tingling or weakness in your legs the day after surgery (if you had spinal anesthesia).    Nurse advice line: [phone]                     [facility]    Home Care Following Hernia Repair (Open or Laparoscopic)    Dr. Hackett    Hernia Type:  Open Hernia    Care of the Incision:    Remove gauze dressing (if present) after 24 hours and then it is ok to shower.     If surgical glue was used, keep your incision dry for 24 hours.  Then you may shower, but don t submerge under water for at least 2 weeks.  Gently pat your incision dry with a freshly laundered towel.    Do not touch your incision with bare hands or pick at scabs.    Leave your incision open to air.  Cover it only if clothing rubs or irritates it.  Activity:    Gradually increase your activity.  Walk short distances several times each day and increase the distance as your strength allows.    To promote circulation, do not cross your legs while sitting.    No strenuous lifting or straining for 2 weeks.   Do not lift anything over 20 pounds for 2 weeks.    Return to work will be determined by the type of work you do and should be discussed with your physician.    Do not drive or operate equipment while taking prescription pain medicines.  You may drive 1 week after surgery if you have stopped taking prescription pain medicines and are pain-free enough to react quickly and make an emergency stop if necessary.    Diet:    Return to the diet you were on before surgery.    Drink plenty of  water.    Avoid foods that cause constipation.       REMEMBER--most prescription pain pills cause constipation.  Walking, extra fluids, and increased fiber (fresh fruits and vegetables, etc.) are natural remedies for constipation.  You can also take mineral oil, 1-2 Tablespoons per day.  If still constipated you may try a stool softener such as Colace or Miralax.    Call Your Physician if You Have:    Redness, increased swelling or cloudy drainage from your incision.    A temperature of more than 101 degrees F.    Worsening pain in your incision not relieved by your prescription pain pills and/or a short rest.    Any questions or concerns about your recovery, please call Business hours [phone]    After hours [phone] Nurse Advice Line (24 hours a day)    Follow-up Care:    Make an appointment 2-3 weeks after your surgery if not already done.  Call [phone]

## 2020-07-13 NOTE — BRIEF OP NOTE
[facility] Brief Operative Note    Pre-operative diagnosis: Recurrent right inguinal hernia [K40.91]  Right inguinal pain [R10.31]   Post-operative diagnosis Same   Procedure: Procedure(s):  Open right inguinal hernia repair   Surgeon(s): Surgeon(s) and Role:     * Ed Hackett MD - Primary   Estimated blood loss: 5 mL    Specimens: ID Type Source Tests Collected by Time Destination   A : Lypoma Right Spermatic Cord Tissue Groin SURGICAL PATHOLOGY EXAM Ed Hackett MD 7/13/2020  8:10 AM       Findings: Large lipoma of the cord       Ed Hackett MD, FACS      #[id number]

## 2020-07-13 NOTE — OR NURSING
Waiting for wife's arrival to pick pt. Up, and In process of getting dressed, pain increasing with activity, oral pain pills given. Ken DOTSON

## 2020-07-14 LAB — COPATH REPORT: NORMAL

## 2020-07-15 NOTE — OR NURSING
[facility] Same Day Surgery  Discharge Call Back  Dustin Mccoy  1975  MRN: [id number]  Home: [phone] (home)   PCP: Marcelino Patrick    We are calling to see how you're doing since your surgery/procedure with us?   Comments: doing ok/wife  Clinical Questions  1. Have you had time to look at your discharge instructions? Do you have any questions in regards to the instructions?   Comment: reviewed c wife today, reinforced driving restrictions and follow up appt. And need to schedule  2. Do you feel your pain is being controlled with the regimen the surgeon sent you home on? (ie: prescription medications, over the counter pain medications, ice packs)   Comments: pt. Not wanting to take narcotic so trying to get by w ibuprofen  3. Have you noticed any drainage on your dressing? Do you know what to do if you have bleeding as a result of your procedure?   Comments: no bleeding  4. Have you had any nausea/vomiting? Do you know how to treat this?   Comment: no nausea  5. Have you had any signs/symptoms of infection? (ie: fever, swelling, heat, drainage or redness) Do you know what to do if you have?   Comment: none  6. Do you have a follow up appointment made with your surgeon? Do you have a number to contact them at if you need it?   Comment: no appt. Yet, wife will call today      You may be randomly selected to fill out a [facility] survey. We would appreciate you taking the time to fill this out. It is important to us if you would answer all of the questions on the survey.

## 2020-07-16 ENCOUNTER — TELEPHONE (OUTPATIENT)
Dept: SURGERY | Facility: CLINIC | Age: 45
End: 2020-07-16

## 2020-07-16 NOTE — TELEPHONE ENCOUNTER
DOS 07-  Open right inguinal hernia repair     VM left requesting patient to call back , when he calls please help him with schedule a post-op appointment...................

## 2020-07-17 NOTE — TELEPHONE ENCOUNTER
Post-op appointment scheduled for 07-..........Martina Henson CMA  ([facility])

## 2020-07-18 ENCOUNTER — HOSPITAL ENCOUNTER (EMERGENCY)
Facility: CLINIC | Age: 45
Discharge: HOME | End: 2020-07-18
Attending: FAMILY MEDICINE | Admitting: FAMILY MEDICINE
Payer: COMMERCIAL

## 2020-07-18 ENCOUNTER — APPOINTMENT (OUTPATIENT)
Dept: CT IMAGING | Facility: CLINIC | Age: 45
End: 2020-07-18
Attending: FAMILY MEDICINE
Payer: COMMERCIAL

## 2020-07-18 VITALS
OXYGEN SATURATION: 99 % | DIASTOLIC BLOOD PRESSURE: 121 MMHG | SYSTOLIC BLOOD PRESSURE: 190 MMHG | HEART RATE: 75 BPM | RESPIRATION RATE: 20 BRPM | TEMPERATURE: 97.3 F

## 2020-07-18 DIAGNOSIS — R20.0 NUMBNESS: ICD-10-CM

## 2020-07-18 DIAGNOSIS — G89.18 ACUTE POST-OPERATIVE PAIN: ICD-10-CM

## 2020-07-18 PROCEDURE — 25000125 ZZHC RX 250: Performed by: FAMILY MEDICINE

## 2020-07-18 PROCEDURE — 25000128 H RX IP 250 OP 636: Performed by: FAMILY MEDICINE

## 2020-07-18 PROCEDURE — 99285 EMERGENCY DEPT VISIT HI MDM: CPT | Mod: 25 | Performed by: FAMILY MEDICINE

## 2020-07-18 PROCEDURE — 99284 EMERGENCY DEPT VISIT MOD MDM: CPT | Mod: Z6 | Performed by: FAMILY MEDICINE

## 2020-07-18 PROCEDURE — 74177 CT ABD & PELVIS W/CONTRAST: CPT

## 2020-07-18 RX ORDER — IOPAMIDOL 755 MG/ML
100 INJECTION, SOLUTION INTRAVASCULAR ONCE
Status: COMPLETED | OUTPATIENT
Start: 2020-07-18 | End: 2020-07-18

## 2020-07-18 RX ADMIN — IOPAMIDOL 97 ML: 755 INJECTION, SOLUTION INTRAVENOUS at 05:25

## 2020-07-18 RX ADMIN — SODIUM CHLORIDE 60 ML: 9 INJECTION, SOLUTION INTRAVENOUS at 05:26

## 2020-07-18 ASSESSMENT — ENCOUNTER SYMPTOMS
DIFFICULTY URINATING: 0
NAUSEA: 0
FEVER: 0
VOMITING: 0

## 2020-07-18 NOTE — DISCHARGE INSTRUCTIONS
The numbness is expected after surgery especially after 1 of the nerves was intentionally cut to help prevent chronic pain and irritation from the mesh used to repair the hernia.  There was no sign of any infection, bleeding or recurrent hernia on your CT scan.  That is reassuring.    Take it easy.  Do not overdo it.  Keep your appointment with Dr. Hackett on Friday as scheduled.    Return to the ED if you develop fever over 100.4, increasing pain, or any concerns.      It was nice visiting with you this morning.  I wish you the very best going forward and hope your recovery goes smoothly.  Stay safe and thank you for all that you do.    Thank you for choosing [facility]. We appreciate the opportunity to meet your urgent medical needs. Please let us know if we could have done anything to make your stay more satisfying.    After discharge, please closely monitor for any new or worsening symptoms. Return to the Emergency Department if you develop any acute worsening signs or symptoms.    If you had lab work, cultures or imaging studies done during your stay, the final results may still be pending. We will call you if your plan of care needs to change. However, if you are not improving as expected, please follow up with your primary care provider or clinic.     Start any prescription medications that were prescribed to you and take them as directed.     Please see additional handouts that may be pertinent to your condition.

## 2020-07-18 NOTE — ED AVS SNAPSHOT
[facility] Emergency Department  [address] [place] [address]  Phone:  [phone]  Fax:  [phone]                                    Dustin Mccoy   MRN: [id number]    Department:  [facility] Emergency Department   Date of Visit:  7/18/2020           After Visit Summary Signature Page    I have received my discharge instructions, and my questions have been answered. I have discussed any challenges I see with this plan with the nurse or doctor.    ..........................................................................................................................................  Patient/Patient Representative Signature      ..........................................................................................................................................  Patient Representative Print Name and Relationship to Patient    ..................................................               ................................................  Date                                   Time    ..........................................................................................................................................  Reviewed by Signature/Title    ...................................................              ..............................................  Date                                               Time          22EPIC Rev 08/18

## 2020-07-18 NOTE — ED PROVIDER NOTES
History     Chief Complaint   Patient presents with     Post-op Problem     HPI  Dustin Mccoy is a 45 year old male who is status post open right inguinal hernia repair on 7/13/2020 with Dr. Hackett.  He had a laparoscopic repair couple of years ago and had recurrence.  He did okay postoperatively for the first few days but has felt like there is increased bulging in the right inguinal region the last day or 2 and feels numbness in the right groin down to the right testicle.  He has some ecchymosis of the scrotum which is not unexpected.  No fevers chills or sweats.  No difficulty urinating.    Dr. Hackett elected to transect the ilioinguinal nerve to prevent chronic pain from irritation from the mesh at the time of surgery.    Allergies:  Allergies   Allergen Reactions     Azithromycin Hives     Bactrim [Sulfamethoxazole W/Trimethoprim]      Erythromycin      Sulfa Drugs        Problem List:    Patient Active Problem List    Diagnosis Date Noted     Recurrent right inguinal hernia 06/03/2020     Priority: Medium     Added automatically from request for surgery [id number]       Right inguinal pain 06/03/2020     Priority: Medium     Added automatically from request for surgery [id number]       Prostate hypertrophy 01/25/2018     Priority: Medium     H/O irritable bowel syndrome 12/28/2015     Priority: Medium        Past Medical History:    Past Medical History:   Diagnosis Date     Atypical chest pain 12/28/2015     H/O irritable bowel syndrome 12/28/2015       Past Surgical History:    Past Surgical History:   Procedure Laterality Date     HERNIORRHAPHY INGUINAL Right 7/13/2020    Procedure: Open right inguinal hernia repair;  Surgeon: Ed Hackett MD;  Location: [facility] OR     LAPAROSCOPIC HERNIORRHAPHY INGUINAL Right 12/17/2018    Procedure: Laparoscopic Right Inguinal Hernia Repair;  Surgeon: Skylar Dudley MD;  Location: [facility] OR       Family History:    Family History   Problem Relation Age of Onset     No  Known Problems Mother      Diabetes Father      Unknown/Adopted Maternal Grandmother      Unknown/Adopted Maternal Grandfather      Unknown/Adopted Paternal Grandmother      Unknown/Adopted Paternal Grandfather      No Known Problems Brother      No Known Problems Sister      No Known Problems Daughter      No Known Problems Sister        Social History:  Marital Status:   [2]  Social History     Tobacco Use     Smoking status: Never Smoker     Smokeless tobacco: Never Used   Substance Use Topics     Alcohol use: Yes     Comment: occasional     Drug use: No        Medications:    ibuprofen (ADVIL/MOTRIN) 200 MG capsule  oxyCODONE-acetaminophen (PERCOCET) 5-325 MG tablet          Review of Systems   Constitutional: Negative for fever.   Gastrointestinal: Negative for nausea and vomiting.   Genitourinary: Negative for difficulty urinating.   All other systems reviewed and are negative.      Physical Exam   BP: (!) 190/121  Pulse: 75  Temp: 97.3  F (36.3  C)  Resp: 20  SpO2: 99 %      Physical Exam  Constitutional:       General: He is not in acute distress.     Appearance: Normal appearance.      Comments: He is able to move from sitting to standing without too much difficulty and removed his underwear on his own.  Moved back to the bed and laid supine without difficulty.   Pulmonary:      Effort: Pulmonary effort is normal. No respiratory distress.   Abdominal:      Tenderness: There is abdominal tenderness (mild in RLQ/R inguinal).      Comments: Incision looks nice and clean.  There is no erythema or warmth.  Some mild tenderness in the right inguinal region with some moderate ecchymosis of the scrotum.  Right testicle is slightly uncomfortable to palpation.  The left is normal.   Musculoskeletal: Normal range of motion.   Skin:     General: Skin is warm and dry.   Neurological:      General: No focal deficit present.      Mental Status: He is alert.         ED Course  (with Medical Decision  Making)      45-year-old gentleman status post open right inguinal hernia repair on 7/13/2020.  At the time of surgery, the ilioinguinal nerve was intentionally transected to prevent chronic pain from irritation from the mesh.  He did well postoperatively but the last few days is felt more bulging in the right inguinal region and numbness in the right inguinal area down into the right testicle.  Has some expected ecchymosis of the scrotum.  No fevers chills or sweats.  No difficulty urinating.    His wound looks nice and clean.  I see no signs of infection.  No obvious masses.  There is some moderate ecchymosis of the scrotum which is to be expected.  I suspect his numbness is due to the intentional transection of the ilioinguinal nerve.  Since he is concerned and is here, we will go ahead and get a CT scan just to rule out any recurrence of his hernia, fluid collection or other acute process.    CT scan was reassuring.  No evidence of recurrent hernia, active bleeding or infection.  He is reassured.  The numbness is to be expected after the ilioinguinal nerve was transected intentionally to prevent chronic pain and irritation from the mesh.  He will keep his follow-up appointment with Dr. Hackett on Friday, July 24 for postop check as scheduled.  We discussed reportable signs and when to return.  He is comfortable with this plan.          Procedures               Critical Care time:  none               Results for orders placed or performed during the hospital encounter of 07/18/20 (from the past 24 hour(s))   CT Abdomen Pelvis w Contrast    Narrative    EXAM: CT ABDOMEN AND PELVIS WITH CONTRAST  LOCATION: [facility]  DATE/TIME: 07/18/2020, 5:17 AM    INDICATION: Recent right inguinal hernia repair 07/13/2020. Patient feels bulging in right inguinal region.  COMPARISON: None.  TECHNIQUE: CT scan of the abdomen and pelvis was performed following injection of IV contrast. Multiplanar reformats were  obtained. Dose reduction techniques were used.  CONTRAST: 97 mL Isovue 370.    FINDINGS:   LOWER CHEST: Minimal atelectasis in the lung bases.    HEPATOBILIARY: Low-attenuation subcentimeter liver lesion(s) compatible with benign cysts or other benign lesions. No specific evaluation or follow-up is recommended in a low risk patient. No calcified gallstones or biliary dilatation.     PANCREAS: Normal.    SPLEEN: Normal.    ADRENAL GLANDS: No significant nodules.    KIDNEYS/BLADDER: Right kidney is normal with no mass, stones, or hydronephrosis.   Left kidney is normal with no mass, stones, or hydronephrosis.  Bladder is normal.    BOWEL: Normal with no obstruction or acute inflammatory change. Nothing for appendicitis.    LYMPH NODES: No lymphadenopathy.    VASCULATURE: Unremarkable.    PELVIC ORGANS: Moderate prostate enlargement.    MUSCULOSKELETAL: Postoperative changes involving the right inguinal region. No evidence for recurrent hernia. Within the right inguinal region, there is minor skin thickening with mild underlying subcutaneous edema and a tiny locule of air. No evidence   for a left inguinal hernia. Minimal degenerative changes in the spine.      Impression    IMPRESSION:   1.  Postoperative changes right inguinal region. No evidence for recurrent fat-containing or bowel-containing hernias. Minimal subcutaneous edema and skin thickening in this area with tiny locules of air compatible with recent surgery.    2.  Moderate prostate enlargement.         Medications   iopamidol (ISOVUE-370) solution 100 mL (97 mLs Intravenous Given 7/18/20 0525)   Sodium Chloride 0.9 % bag 100mL for CT scan (60 mLs Intravenous Given 7/18/20 0526)   sodium chloride (PF) 0.9% PF flush 3 mL (3 mLs Intracatheter Given 7/18/20 0525)       Assessments & Plan     I have reviewed the nursing notes.    I have reviewed the findings, diagnosis, plan and need for follow up with the patient.       New Prescriptions    No medications on  file       Final diagnoses:   Acute post-operative pain   Numbness - right inguinal region, post op       7/18/2020   [facility] EMERGENCY DEPARTMENT     Franklyn David MD  07/18/20 0692

## 2020-07-24 ENCOUNTER — OFFICE VISIT (OUTPATIENT)
Dept: SURGERY | Facility: CLINIC | Age: 45
End: 2020-07-24
Payer: COMMERCIAL

## 2020-07-24 VITALS
TEMPERATURE: 98.1 F | SYSTOLIC BLOOD PRESSURE: 136 MMHG | HEIGHT: 71 IN | BODY MASS INDEX: 33.4 KG/M2 | WEIGHT: 238.6 LBS | DIASTOLIC BLOOD PRESSURE: 82 MMHG

## 2020-07-24 DIAGNOSIS — Z98.890 POST-OPERATIVE STATE: Primary | ICD-10-CM

## 2020-07-24 PROCEDURE — 99024 POSTOP FOLLOW-UP VISIT: CPT | Performed by: SPECIALIST

## 2020-07-24 ASSESSMENT — MIFFLIN-ST. JEOR: SCORE: 1979.41

## 2020-07-24 NOTE — PROGRESS NOTES
F/U for RIH repair      Subjective:   Patient is 1-1/2 weeks status post an open right inguinal hernia repair.  He did have a single episode of pain 7 days out that now resolved.  He is currently doing very well.  He would like to return to work 7/29.      Objective:  B/P: 136/82, T: 98.1, P: Data Unavailable, R: Data Unavailable  Abd; Soft, non tender, non distended, hernia repair intact    Assessment/plan:  Patient is status post an open right inguinal hernia repair.  Doing well.  He will gradually increase his activity as tolerated.  He can return to work 7/29 with no restrictions.      Ed Hackett MD, FACS

## 2020-07-24 NOTE — LETTER
7/24/2020         RE: Dustin Mccoy  [address] [facility] [address]        Dear Colleague,    Thank you for referring your patient, Dustin Mccoy, to the [facility]. Please see a copy of my visit note below.    F/U for RIH repair      Subjective:   Patient is 1-1/2 weeks status post an open right inguinal hernia repair.  He did have a single episode of pain 7 days out that now resolved.  He is currently doing very well.  He would like to return to work 7/29.      Objective:  B/P: 136/82, T: 98.1, P: Data Unavailable, R: Data Unavailable  Abd; Soft, non tender, non distended, hernia repair intact    Assessment/plan:  Patient is status post an open right inguinal hernia repair.  Doing well.  He will gradually increase his activity as tolerated.  He can return to work 7/29 with no restrictions.      Ed Hackett MD, FACS                Again, thank you for allowing me to participate in the care of your patient.        Sincerely,        Ed Hackett MD

## 2020-07-24 NOTE — LETTER
[address] [address]  Phone: [phone]    July 24, 2020        Dustin Mccoy  [address] [facility] [address]          To whom it may concern:    RE: Dustin Mccoy    Patient was seen and treated today at our clinic. Patient is able to return to work on 7/29/2020 with NO restrictions     Please contact me for questions or concerns.      Sincerely,        Ed Hackett MD

## 2020-07-28 ENCOUNTER — TELEPHONE (OUTPATIENT)
Dept: SURGERY | Facility: CLINIC | Age: 45
End: 2020-07-28

## 2020-07-28 NOTE — TELEPHONE ENCOUNTER
Forms completed and faxed to [facility] Department [phone]. Copy to scanning.   Kayley Bradford RN on 7/28/2020 at 3:48 PM

## 2020-10-13 ENCOUNTER — VIRTUAL VISIT (OUTPATIENT)
Dept: FAMILY MEDICINE | Facility: CLINIC | Age: 45
End: 2020-10-13
Payer: COMMERCIAL

## 2020-10-13 ENCOUNTER — NURSE TRIAGE (OUTPATIENT)
Dept: NURSING | Facility: CLINIC | Age: 45
End: 2020-10-13

## 2020-10-13 VITALS — BODY MASS INDEX: 32.08 KG/M2 | WEIGHT: 230 LBS

## 2020-10-13 DIAGNOSIS — K58.0 IRRITABLE BOWEL SYNDROME WITH DIARRHEA: Primary | ICD-10-CM

## 2020-10-13 PROCEDURE — 99213 OFFICE O/P EST LOW 20 MIN: CPT | Mod: TEL | Performed by: NURSE PRACTITIONER

## 2020-10-13 RX ORDER — MONTELUKAST SODIUM 4 MG/1
1 TABLET, CHEWABLE ORAL
Qty: 30 TABLET | Refills: 1 | Status: SHIPPED | OUTPATIENT
Start: 2020-10-13 | End: 2021-06-21

## 2020-10-13 RX ORDER — LOPERAMIDE HCL 2 MG
2 CAPSULE ORAL 4 TIMES DAILY PRN
COMMUNITY

## 2020-10-13 RX ORDER — AMITRIPTYLINE HYDROCHLORIDE 10 MG/1
20 TABLET ORAL AT BEDTIME
Qty: 60 TABLET | Refills: 1 | Status: SHIPPED | OUTPATIENT
Start: 2020-10-13 | End: 2021-06-21

## 2020-10-13 ASSESSMENT — PATIENT HEALTH QUESTIONNAIRE - PHQ9: SUM OF ALL RESPONSES TO PHQ QUESTIONS 1-9: 3

## 2020-10-13 NOTE — PROGRESS NOTES
"Dustin Mccoy is a 45 year old male who is being evaluated via a billable telephone visit.      The patient has been notified of following:     \"This telephone visit will be conducted via a call between you and your physician/provider. We have found that certain health care needs can be provided without the need for a physical exam.  This service lets us provide the care you need with a short phone conversation.  If a prescription is necessary we can send it directly to your pharmacy.  If lab work is needed we can place an order for that and you can then stop by our lab to have the test done at a later time.    Telephone visits are billed at different rates depending on your insurance coverage. During this emergency period, for some insurers they may be billed the same as an in-person visit.  Please reach out to your insurance provider with any questions.    If during the course of the call the physician/provider feels a telephone visit is not appropriate, you will not be charged for this service.\"    Patient has given verbal consent for Telephone visit?  Yes    What phone number would you like to be contacted at? [phone]    How would you like to obtain your AVS? [facility]     Dustin Mccoy is a 45 year old male who presents via phone visit today for the following health issues:    HPI     Diarrhea  Onset/Duration: 1 week  Description:       Consistency of stool: loose       Blood in stool: no       Number of loose stools past 24 hours: 3-4  Progression of Symptoms: worsening  Accompanying signs and symptoms:       Fever: no       Nausea/Vomiting: no       Abdominal pain: no       Weight loss: no       Episodes of constipation: no  History   Ill contacts: no  Recent use of antibiotics: no  Recent travels: no  Recent medication-new or changes(Rx or OTC): no  Precipitating or alleviating factors: None  Therapies tried and outcome: Imodium right ear- to relief       Patient has worked " extensively with GI in the past last provider being Dr. Fernandez. He has overall been stable needing only occasional imodium to manage symptoms. 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 He is negative for mucus or blood in the stool. No fevers or body aches. He has not been followed by GI since 2017 and would like to establish with them in [place].       Review of Systems   Constitutional, HEENT, cardiovascular, pulmonary, gi and gu systems are negative, except as otherwise noted.       Objective          Vitals:  No vitals were obtained today due to virtual visit.    healthy, alert and no distress  PSYCH: Alert and oriented times 3; coherent speech, normal   rate and volume, able to articulate logical thoughts, able   to abstract reason, no tangential thoughts, no hallucinations   or delusions  His affect is normal  RESP: No cough, no audible wheezing, able to talk in full sentences  Remainder of exam unable to be completed due to telephone visits    Reviewed GI notes, labs, colonoscopy Path in EPIC        Assessment/Plan:    Assessment & Plan     Irritable bowel syndrome with diarrhea  -Patient has had extensive work-up for IBS in the past.  Colonoscopy in 2014 was negative other than minor diverticulosis.  Lactose intolerance test was negative celiac testing was negative all stool studies were negative as well.  Ultimately diagnosis for IBS.  -Patient's last treatment regimen which worked well for him at that time was 1 to 2 tablets of Colestid 1 gm each tablet, to be taken in the morning; and then 20 mg of amitriptyline to be taken at bedtime.  We will restart him on the medications GI it put him on for the last exacerbation.  Plan will be to have him only use 1 Colestid tablet in the mornings until he sees GI.  -I will also get him referred back to GI for an assessment, to determine if this is appropriate plan of care for now  and moving forward and if he needs any further follow-up.  -Patient agrees with plan of care and will call clinic if symptoms do not improve over the next week.  - colestipol (COLESTID) 1 g tablet; Take 1 tablet (1 g) by mouth daily before breakfast  - amitriptyline (ELAVIL) 10 MG tablet; Take 2 tablets (20 mg) by mouth At Bedtime  - GASTROENTEROLOGY ADULT REF CONSULT ONLY; Future     Follow-up with myself or primary care provider if no improvement in 14 days.    Mat Deneny NP  [facility]    Phone call duration:  15 minutes

## 2020-10-13 NOTE — TELEPHONE ENCOUNTER
IBS patient calling.  He is reporting he has ;  Symptoms    BM's are increased   Having about 3-4 daily  Blood due to sore rectum after 3,or 4  Stools. , he reports'    No fever.    Been taking imodium , one   A day.  Saw a specialist, GI , about 1year ago.  No medication was given  He reports;   No abdominal pain.  IBS has been dx a few Years ago.  He would like to be seen . But will settle for telephone appointment with provider today.  Patient was forwarded to scheduling.  To make telephone appointment.    Keiry Osei RN on 10/13/2020 at 1:47 PM            Additional Information    Negative: Shock suspected (e.g., cold/pale/clammy skin, too weak to stand, low BP, rapid pulse)    Negative: Difficult to awaken or acting confused (e.g., disoriented, slurred speech)    Negative: Sounds like a life-threatening emergency to the triager    Negative: Vomiting also present and worse than the diarrhea    Negative: Blood in stool and without diarrhea    Negative: SEVERE abdominal pain (e.g., excruciating) and present > 1 hour    Negative: SEVERE abdominal pain and age > 60    Negative: Bloody, black, or tarry bowel movements    Negative: SEVERE diarrhea (e.g., 7 or more times / day more than normal) and age > 60 years    Negative: Constant abdominal pain lasting > 2 hours    Negative: Drinking very little and has signs of dehydration (e.g., no urine > 12 hours, very dry mouth, very lightheaded)    Negative: Patient sounds very sick or weak to the triager    Negative: SEVERE diarrhea (e.g., 7 or more times / day more than normal) and present > 24 hours (1 day)    Negative: MODERATE diarrhea (e.g., 4-6 times / day more than normal) and present > 48 hours (2 days)    Negative: MODERATE diarrhea (e.g., 4-6 times / day more than normal) and age > 70 years    Negative: Abdominal pain  (Exception: pain clears completely with each passage of diarrhea stool)    Negative: Fever > 101 F (38.3 C)    Negative: Blood in the stool     Negative: Mucus or pus in stool has been present > 2 days and diarrhea is more than mild    Negative: Weak immune system (e.g., HIV positive, cancer chemo, splenectomy, organ transplant, chronic steroids)    Negative: Travel to a foreign country in past month    Negative: Recent antibiotic therapy (i.e., within last 2 months) and diarrhea present > 3 days since antibiotic was stopped    Negative: Recent hospitalization and diarrhea present > 3 days    Negative: Tube feedings (e.g., nasogastric, g-tube, j-tube)    MILD diarrhea (e.g., 1-3 or more stools than normal in past 24 hours) diarrhea without known cause and present > 7 days    Protocols used: DIARRHEA-A-OH

## 2020-10-14 ENCOUNTER — TELEPHONE (OUTPATIENT)
Dept: FAMILY MEDICINE | Facility: CLINIC | Age: 45
End: 2020-10-14

## 2020-10-14 DIAGNOSIS — K58.0 IRRITABLE BOWEL SYNDROME WITH DIARRHEA: Primary | ICD-10-CM

## 2020-10-14 RX ORDER — CHOLESTYRAMINE 4 G/9G
4 POWDER, FOR SUSPENSION ORAL DAILY
Qty: 120 G | Refills: 1 | Status: SHIPPED | OUTPATIENT
Start: 2020-10-14 | End: 2021-06-21

## 2020-10-14 NOTE — TELEPHONE ENCOUNTER
Patient had virtual visit with Mat amaral on 10/13/2020 was prescribed Colestipol HCL 1Gm tablets. Per fax from [facility] this medication is not available please send alternative of Cholestyramine?     Olga Sosa MA

## 2020-10-14 NOTE — TELEPHONE ENCOUNTER
Please call patient and tell him prescription was changed from the Colestipol 1 gm tablets which are not available to the Cholestyramine Packet - 4gm. Tell him to take the 1 packet  In the morning, follow instructions from pharmacy and the Elavil at bedtime until he can get to the GI appointment.     Mat Denney CNP

## 2020-11-03 ENCOUNTER — VIRTUAL VISIT (OUTPATIENT)
Dept: GASTROENTEROLOGY | Facility: CLINIC | Age: 45
End: 2020-11-03
Payer: COMMERCIAL

## 2020-11-03 VITALS — WEIGHT: 229 LBS | BODY MASS INDEX: 32.06 KG/M2 | HEIGHT: 71 IN

## 2020-11-03 DIAGNOSIS — K58.0 IRRITABLE BOWEL SYNDROME WITH DIARRHEA: ICD-10-CM

## 2020-11-03 PROCEDURE — 99203 OFFICE O/P NEW LOW 30 MIN: CPT | Mod: TEL | Performed by: INTERNAL MEDICINE

## 2020-11-03 RX ORDER — DICYCLOMINE HYDROCHLORIDE 10 MG/1
10 CAPSULE ORAL 2 TIMES DAILY PRN
Qty: 60 CAPSULE | Refills: 3 | Status: SHIPPED | OUTPATIENT
Start: 2020-11-03 | End: 2021-06-21

## 2020-11-03 ASSESSMENT — MIFFLIN-ST. JEOR: SCORE: 1935.87

## 2020-11-03 NOTE — PROGRESS NOTES
Visit Date:   11/03/2020      Alfredo is evaluated by means of a virtual visit today for symptoms suggesting diarrhea-dominant irritable bowel syndrome.  He has previously undergone an extensive evaluation through [facility] about 5 years ago; at which time, he had colonoscopy, various stool tests, etc.  He was eventually diagnosed with IBS-D and recommended treatment with amitriptyline 10-20 mg daily at bedtime and Colestid tablets 1 gram at breakfast time.  This did seem to work well for him in the past.  Eventually, his symptoms resolved and he discontinued the medications.  This past month, however, his symptoms seemed to recur and escalate and he saw his primary care provider.  Amitriptyline was restarted and Questran was initiated rather than Colestid.  The patient states he is not yet back to baseline.      In short, the patient reports 1 or 2 stools per day, which can occur with some urgency and seems to be associated with a fair amount of flatulence and borborygmi.  He reports feeling bloated and distended.  His stools are characterized as soft or mushy.  They do remain cohesive though after passed into the toilet.  He has had no rectal bleeding or mucus discharge.  He denies any significant abdominal pain.  He denies nausea, vomiting, heartburn, dysphagia, anorexia or unexpected weight loss.  No fever, sweats or chills.      Other than the medications already mentioned, he has not been started on any other new medicines.  He has not been exposed to any antibiotics in the last 3-4 months.  He does have a new dog, but it is not in the house.      The patient works as a .  This does carry some stress and a bit of anxiety with the job, but he states he is dealing with it well and continues to enjoy his work.      CURRENT MEDICATIONS:  Amitriptyline 10-20 mg at bedtime, Questran 4 grams p.o. daily, Imodium 2 mg p.o. once or twice daily.      MEDICATION ALLERGIES:  AZITHROMYCIN CAUSED HIVES.  BACTRIM  CAUSED A RASH.      No exam was done today as this was a virtual visit done during the COVID pandemic.      ASSESSMENT:  The patient's symptoms do suggest irritable bowel syndrome-D.  He does not have any worrisome symptoms such as bleeding, overt diarrhea, weight loss or fever.      PLAN:     1.  Since he is experiencing some side effects from the amitriptyline (over sedation), we will discontinue that in favor of Bentyl 10 mg, which he can use once or twice daily at his discretion for the cramping and borborygmi.     2.  The Questran may actually be aggravating his gas, bloating and borborygmi and will be discontinued.   3.  I suggested a 14-day course of Xifaxan 550 mg twice daily for presumed IBS-D.  This can sometimes produce long lasting results.   4.  He was encouraged to contact me by phone or MyChart with an update when he completes the course of Xifaxan.  At this time, I do not see the need for any other testing.         DARBY AMARO MD             D: 2020   T: 2020   MT: VINNY      Name:     YARA UP   MRN:      [id number]        Account:      [id number]   :      1975           Visit Date:   2020      Document: [id number]

## 2020-11-03 NOTE — PROGRESS NOTES
"Dustin Mccoy is a 45 year old male who is being evaluated via a billable telephone visit.      The patient has been notified of following:     \"This telephone visit will be conducted via a call between you and your physician/provider. We have found that certain health care needs can be provided without the need for a physical exam.  This service lets us provide the care you need with a short phone conversation.  If a prescription is necessary we can send it directly to your pharmacy.  If lab work is needed we can place an order for that and you can then stop by our lab to have the test done at a later time.    Telephone visits are billed at different rates depending on your insurance coverage. During this emergency period, for some insurers they may be billed the same as an in-person visit.  Please reach out to your insurance provider with any questions.    If during the course of the call the physician/provider feels a telephone visit is not appropriate, you will not be charged for this service.\"    Patient has given verbal consent for Telephone visit?  Yes    What phone number would you like to be contacted at? [phone]    How would you like to obtain your AVS? [facility]    Phone call duration: 20 minutes    Jose Elias Rodriguez MD      "

## 2020-12-20 ENCOUNTER — HEALTH MAINTENANCE LETTER (OUTPATIENT)
Age: 45
End: 2020-12-20

## 2021-05-16 NOTE — LETTER
[address] [address]  Phone: [phone]  Fax: [phone]    08/23/18    Dustin Mccoy  [address] [facility] [address]      To whom it may concern:       I am writing on behalf of my patient, Mr. Mccoy. Please excuse patient from work as it relates to his FMLA. He is having difficulty with ongoing mental health symptoms and is working to better manage these symptoms. He will be out of work starting 8/17/18 and will return to work on 9/4/18, with returning to his previous work shift with no restrictions.    Please contact this writer with any further concerns.      Sincerely,        Janeen Harry [facility]  Behavioral Health Clinician           [FreeTextEntry1] : 66F hx left sided Breast ca in 2006 s/p chemotherapy (taxotere, methotrexate and 5FU), radiation and b/l mastectomies in 2012, diabetes, htn, and hypercholesterolemia presenting for follow-up elevated hemoglobin level.\par \par Negative for CASSANDRA 2. Her epo levels are normal (high range). We would like to work up the possibility of secondary polycythemia. She was scheduled to have an MRI to better define the tiny lesion noted on the kidney, but she cancelled it due to ongoing pandemic. Also, we are recommending to her to do a sleep study, as she may have undiagnosed sleep apnea. She is obese by BMI but reports that she doesn't snore.\par \par Patient denies any neurological symptoms, I advised her to continue with baby ASA, which she is taking for DM. She is aware that we need to rule out secondary polycythemia, which didn't change after increasing her fluid intake.\par \par All questions and concerns addressed to the best of my ability. We will talk again once results of testing are back.

## 2021-06-01 ENCOUNTER — TELEPHONE (OUTPATIENT)
Dept: GASTROENTEROLOGY | Facility: CLINIC | Age: 46
End: 2021-06-01

## 2021-06-01 NOTE — TELEPHONE ENCOUNTER
[facility] Call Center    Phone Message    May a detailed message be left on voicemail: yes     Reason for Call: patient last seen as new patient w the [facility] de[facility]/Dr Rodriguez in Nov 2020. NEW patient now sched w [facility] for next avail - 8/10.  Called to sched a follow up as his symptoms of IBS has not improved and has gotten worse. Patient added to wait list as sched for 8/10 w [facility], and concerned about what to do in meantime re his symptoms. He states he had no communications at all from [facility] about the dept no longer existing...    Action Taken: Message routed to:  Adult Clinics: Gastroenterology (GI) p [id number]    Travel Screening: Not Applicable

## 2021-06-02 NOTE — TELEPHONE ENCOUNTER
LPN left message for patient requesting a return call to discuss symptoms.     Avani Alicea, CORINNEN

## 2021-06-02 NOTE — TELEPHONE ENCOUNTER
Pt returned phone call.  No one from the care team was available to speak with him.  Please try calling him back.  Thanks.

## 2021-06-03 NOTE — TELEPHONE ENCOUNTER
"Patient contacted, is having on average one loose stool per day, but does have quite a bit of cramping and feeling as though he is \"not done\".  Patient has \"a handful\" of dicyclomine tablets left.  He does not take Colestid or cholestyramine.  Requests to establish with a new GI provider due to his prior provider no longer practicing and no other GI providers at the [place] location.      Patient was offered and accepted an appointment with Yesi Herndon PA-C on 7/6/2021.  Advised patient he will be contacted if he could be seen sooner.      Kelsea Isidro RN    "

## 2021-06-04 NOTE — TELEPHONE ENCOUNTER
Per Yesi Herndon PA-C, patient is okay to be scheduled as a return patient. LPN spoke with patient and assisted him with rescheduling to 6/21/21. Patient was happy with the call and assistance.     Avani Alicea LPN

## 2021-06-21 ENCOUNTER — VIRTUAL VISIT (OUTPATIENT)
Dept: GASTROENTEROLOGY | Facility: CLINIC | Age: 46
End: 2021-06-21
Payer: COMMERCIAL

## 2021-06-21 DIAGNOSIS — K58.0 IRRITABLE BOWEL SYNDROME WITH DIARRHEA: Primary | ICD-10-CM

## 2021-06-21 DIAGNOSIS — R19.7 DIARRHEA, UNSPECIFIED TYPE: ICD-10-CM

## 2021-06-21 PROCEDURE — 99214 OFFICE O/P EST MOD 30 MIN: CPT | Mod: TEL | Performed by: PHYSICIAN ASSISTANT

## 2021-06-21 RX ORDER — DICYCLOMINE HYDROCHLORIDE 10 MG/1
10 CAPSULE ORAL 2 TIMES DAILY PRN
Qty: 60 CAPSULE | Refills: 3 | Status: SHIPPED | OUTPATIENT
Start: 2021-06-21 | End: 2022-07-22

## 2021-06-21 NOTE — PROGRESS NOTES
GASTROENTEROLOGY FOLLOW UP TELEPHONE VISIT    CC/REFERRING MD:    Marcelino Patrick    REASON FOR CONSULTATION:   Referred by Mat Denney for Consult (Irritable bowel syndrome with diarrhea )      HISTORY OF PRESENT ILLNESS:    Dustin Mccoy is 46 year old male who presents for follow up. He was previously evaluated by Dr. Rodriguez in November 2020 prior to his retiring. See previous note for more detail.     Briefly, he has a history of symptoms for several years. He had a work up with [facility] several years ago which entailed stool studies and colonoscopy. He was eventually diagnosed with IBS-D and given treatment with amitriptyline and colestid/cholestyramine tablets. Neither of these regimens worked for him in the past. During his visit with Dr. Rodriguez he was recommended to start bentyl instead and given an rx of xifaxan. Unfortunately xifaxan was not covered and he was not able to try the medication. He lost follow up as Dr. Rodriguez has since retired.     He presents today to follow up with our GI office. He continues to have looser stools with abdominal cramping on a regular basis. He finds that he has to take imodium every other day to control symptoms. He has tried adjusting his diet without significant improvement. This includes trial of dairy free, reducing caffeine and carbonated beverages.    He does admit that there is some increased stress recently as he will be the interim  for a period of time while  is out on maternity leave.     PMHx, PSHx, Social Hx, Family Hx have been reviewed.       PERTINENT STUDIES: (I personally reviewed these laboratory studies today)  Most recent CBC:   Recent Labs   Lab Test 05/29/19  1020 12/05/18  0803 01/03/17  0521   WBC 5.8  --  7.6   HGB 15.2 16.3 13.2*   HCT 45.7  --  39.4*     --  209     Most recent creatinine:  Recent Labs   Lab Test 05/29/19  1020 01/03/17  0521   CR 0.93 0.91                Physical Exam   healthy, alert and no distress  PSYCH: Alert and oriented times 3; coherent speech, normal   rate and volume, able to articulate logical thoughts, able   to abstract reason, no tangential thoughts, no hallucinations   or delusions, his affect is normal  RESP: No cough, no audible wheezing, able to talk in full sentences  Remainder of exam unable to be completed due to telephone visits        ASSESSMENT/PLAN:    1. Irritable bowel syndrome with diarrhea  - rifaximin (XIFAXAN) 550 MG TABS tablet; Take 1 tablet (550 mg) by mouth 3 times daily for 14 days  Dispense: 42 tablet; Refill: 0  - dicyclomine (BENTYL) 10 MG capsule; Take 1 capsule (10 mg) by mouth 2 times daily as needed (cramping, loose stools)  Dispense: 60 capsule; Refill: 3  2. Diarrhea, unspecified type      Dustin Mccoy is a 46 year old male who presents for evaluation of diarrhea. He has a history of IBS-D diagnosed several years ago after work up with colonoscopy and stool studies. We reviewed his symptomology today and it is consistent with this diagnosis however given that it has been a few years since work up, recommended rechecking labs, stool studies and colonoscopy at this time. Labs to include inflammatory markers, tsh and celiac screening. Stool studies to include infectious and inflammatory stool marker. Colonoscopy to r/o microscopic colitis.     In the interim he can continue bentyl (dicyclomine) as needed. Will also try for xifaxan as he did not get this prescription previously when prescribed by Dr. Rodriguez (wasn't covered by insurance). Xifaxan is indicated for his IBS-D and can potentially offer lasting effects compared to other treatment options which he has already tried and failed including cholestyramine, colestid, amitriptyline and even bentyl.     Further recommendations after work up.     Orders placed today:   - CRP inflammation; Future  - Erythrocyte sedimentation rate auto; Future  - CBC with  platelets differential; Future  - Comprehensive metabolic panel; Future  - Tissue transglutaminase marielle IgA and IgG; Future  - IgA; Future  - TSH with free T4 reflex; Future  - Calprotectin Feces; Future  - Clostridium difficile toxin B PCR; Future  - Cryptosporidium/Giardia Immunoassay; Future  - Ova and Parasite Exam Routine; Future  - Enteric Bacteria and Virus Panel by KENYA Stool; Future  - GASTROENTEROLOGY ADULT REF PROCEDURE ONLY; Future        Thank you for this consultation.  It was a pleasure to participate in the care of this patient; please contact us with any further questions.    This note was created with voice recognition software, and while reviewed for accuracy, typos may remain.       Yesi Herndon PA-C  Gastroenterology  [facility]     Phone call duration: 23 minutes

## 2021-06-21 NOTE — PROGRESS NOTES
Alfredo is a 46 year old who is being evaluated via a billable telephone visit.      What phone number would you like to be contacted at? [phone]  How would you like to obtain your AVS? Juan Padilla [facility]

## 2021-06-21 NOTE — PATIENT INSTRUCTIONS
It was a pleasure visiting with you today.     Please make a lab appointment for blood work and to collect stool kits. You can [phone] to schedule at any convenient [facility] location. We will either call you or send you a My Chart message with your results.     Please schedule your colonoscopy. If you have not heard from the scheduling office within 2 business days, please call [phone] to schedule.     Let's plan to follow up 2-3 weeks after your work up.     In the interim you can continue taking dicyclomine as needed. A refill has been sent to your pharmacy.     A prescription for xifaxan will also be sent to your pharmacy. This is a 2 week course of medication indicated for IBS-D. Saving/copay card information can be found at [email or web address]         Yesi Herndon PA-C  Gastroenterology  [facility]

## 2021-06-28 ENCOUNTER — TELEPHONE (OUTPATIENT)
Dept: GASTROENTEROLOGY | Facility: CLINIC | Age: 46
End: 2021-06-28

## 2021-06-28 NOTE — TELEPHONE ENCOUNTER
Prior Authorization Retail Medication Request    Medication/Dose: Xifaxan 550 mg  ICD code (if different than what is on RX):    Previously Tried and Failed:    Rationale:      Insurance Name:    Insurance ID:        Pharmacy Information (if different than what is on RX)  Name:    Phone:      Avani Alicea LPN

## 2021-06-28 NOTE — TELEPHONE ENCOUNTER
Central Prior Authorization Team   Phone: [phone]      PA Initiation    Medication: Xifaxan-PA initiated  Insurance Company: [employer] - Phone [phone] Fax [phone]  [facility]illing the Rx[facility] [place] - [address]  Filling Pharmacy Phone: [phone]  Filling Pharmacy Fax:    Start Date: 6/28/2021

## 2021-06-29 NOTE — TELEPHONE ENCOUNTER
Prior Authorization Approval    Authorization Effective Date: 6/29/2021  Authorization Expiration Date: 7/13/2021  Medication: Xifaxan-PA approved  Approved Dose/Quantity:   Reference #:     Insurance Company: [employer] - Phone [phone] Fax [phone]  Expected CoPay:     $764  CoPay Card Available:      Foundation Assistance Needed:    Which Pharmacy is filling the prescription (Not needed for infusion/clinic administered): [facility] [place] [facility] - [address] [facility]  Pharmacy Notified: Yes  Patient Notified: No-pharmacy has left a message for patient informing of cost, which is at least partially due to meeting his deductible. They have not received a response from him.

## 2021-06-30 NOTE — TELEPHONE ENCOUNTER
"ESTELLA spoke with Gurpreet at [facility] pharmacy to follow up if patient has been in contact with them regarding the Rifaximin. Gurpreet stated \"we left him a message yesterday, but have not heard back from him.\" LPN stated clinic would try contacting him.    Avani Alicea, ESTELLA    "

## 2021-06-30 NOTE — TELEPHONE ENCOUNTER
"LPN spoke with patient to discuss the Rifaximin. Patient stated he had not gotten the message from them. LPN informed patient that the copay due to his deductible was $764. Patient stated \"holy smokes, that's more than I was expecting. Before, I was told something about a savings card.\" LPN looked up Good Rx and reviewed pricing and noted that medication was more expensive using that. Patient is wondering if there is an alternative medication. LPN stated a message would be sent to provider to advise and clinic would contact him back with a response. Patient verified understanding and had no further questions at this time.     Avani Alicea LPN    "

## 2021-07-01 NOTE — TELEPHONE ENCOUNTER
LPN spoke with patient and notified him of Yesi Herndon PA-C response below. LPN provided patient with the website for the Xifaxan copay savings card ([email or web address] and informed him to attempt using this and if the copay continues to be unaffordable to contact our office. Patient was appreciative of the assistance and call back.     Yesi Herndon PA-C  You; [facility] 23 hours ago (3:51 PM)     Is that price after the xifaxan copay card?     Yesi     Message text      Avani Marion LPN

## 2021-07-06 NOTE — TELEPHONE ENCOUNTER
LPN left message for patient requesting a return call to follow up with patient and if he was able to get the Xifaxan with the copay savings card.     Avani Marion LPN

## 2021-07-12 NOTE — TELEPHONE ENCOUNTER
ESTELLA spoke with Jah at [facility]'s pharmacy. Jah stated that the patient has not picked up the prescription at this time. LPN left message for patient requesting a return call to follow up with him.     Avani Marion LPN

## 2021-07-20 NOTE — TELEPHONE ENCOUNTER
No return call.  [employer] message sent requesting update.  No further attempts will be made to contact patient, but will remain available if patient reaches out.     Kelsea Isidro RN

## 2021-10-03 ENCOUNTER — HEALTH MAINTENANCE LETTER (OUTPATIENT)
Age: 46
End: 2021-10-03

## 2022-01-02 ENCOUNTER — APPOINTMENT (OUTPATIENT)
Dept: GENERAL RADIOLOGY | Facility: CLINIC | Age: 47
End: 2022-01-02
Attending: EMERGENCY MEDICINE
Payer: COMMERCIAL

## 2022-01-02 ENCOUNTER — HOSPITAL ENCOUNTER (EMERGENCY)
Facility: CLINIC | Age: 47
Discharge: HOME | End: 2022-01-02
Attending: EMERGENCY MEDICINE | Admitting: EMERGENCY MEDICINE
Payer: COMMERCIAL

## 2022-01-02 VITALS
DIASTOLIC BLOOD PRESSURE: 80 MMHG | OXYGEN SATURATION: 94 % | RESPIRATION RATE: 18 BRPM | SYSTOLIC BLOOD PRESSURE: 136 MMHG | TEMPERATURE: 98.8 F | HEART RATE: 89 BPM

## 2022-01-02 DIAGNOSIS — U07.1 INFECTION DUE TO 2019 NOVEL CORONAVIRUS: ICD-10-CM

## 2022-01-02 LAB
FLUAV RNA SPEC QL NAA+PROBE: NEGATIVE
FLUBV RNA RESP QL NAA+PROBE: NEGATIVE
SARS-COV-2 RNA RESP QL NAA+PROBE: POSITIVE

## 2022-01-02 PROCEDURE — 93005 ELECTROCARDIOGRAM TRACING: CPT | Performed by: EMERGENCY MEDICINE

## 2022-01-02 PROCEDURE — 71045 X-RAY EXAM CHEST 1 VIEW: CPT

## 2022-01-02 PROCEDURE — C9803 HOPD COVID-19 SPEC COLLECT: HCPCS | Performed by: EMERGENCY MEDICINE

## 2022-01-02 PROCEDURE — 250N000011 HC RX IP 250 OP 636: Performed by: EMERGENCY MEDICINE

## 2022-01-02 PROCEDURE — 87636 SARSCOV2 & INF A&B AMP PRB: CPT | Performed by: EMERGENCY MEDICINE

## 2022-01-02 PROCEDURE — 99285 EMERGENCY DEPT VISIT HI MDM: CPT | Mod: 25 | Performed by: EMERGENCY MEDICINE

## 2022-01-02 PROCEDURE — 93010 ELECTROCARDIOGRAM REPORT: CPT | Performed by: EMERGENCY MEDICINE

## 2022-01-02 RX ORDER — DEXAMETHASONE 6 MG/1
6 TABLET ORAL DAILY
Qty: 5 TABLET | Refills: 0 | Status: SHIPPED | OUTPATIENT
Start: 2022-01-02 | End: 2022-07-22

## 2022-01-02 RX ADMIN — DEXAMETHASONE 6 MG: 2 TABLET ORAL at 21:45

## 2022-01-03 NOTE — ED NOTES
Reviewed discharge instruction, verbalized understanding. No questions or concerns. Meds reviewed. VS reassessed.    Provided pulse ox and education on use. Monitoring pulse oxymetry.

## 2022-01-03 NOTE — ED PROVIDER NOTES
History     Chief Complaint   Patient presents with     Cough     Shortness of Breath     HPI  Dustin Mccoy is a 46 year old male who presents with 2-week history of congestion and cough.  Cough has been clear.  Developed fever over the last few days.  Chest pain with coughing.  No exertional chest pain.  Feels more short of breath today.  No nausea or vomiting.  No diarrhea.  Denies any ear pain.  Congestion and  loss of taste and smell.  Sore throat earlier but that is resolved.  Never smoked.  History of bronchitis but no history of pneumonia.  No history of asthma.  His wife and son had similar symptoms for 2 weeks.  They were tested at [facility] and were negative for Covid.  Patient has not taken anything for his symptoms prior to arrival today.  Has not been vaccinated for Covid or for influenza.    Allergies:  Allergies   Allergen Reactions     Azithromycin Hives     Bactrim [Sulfamethoxazole W/Trimethoprim]      Erythromycin      Sulfa Drugs        Problem List:    Patient Active Problem List    Diagnosis Date Noted     Recurrent right inguinal hernia 06/03/2020     Priority: Medium     Added automatically from request for surgery [id number]       Right inguinal pain 06/03/2020     Priority: Medium     Added automatically from request for surgery [id number]       Prostate hypertrophy 01/25/2018     Priority: Medium     H/O irritable bowel syndrome 12/28/2015     Priority: Medium        Past Medical History:    Past Medical History:   Diagnosis Date     Atypical chest pain 12/28/2015     H/O irritable bowel syndrome 12/28/2015       Past Surgical History:    Past Surgical History:   Procedure Laterality Date     HERNIORRHAPHY INGUINAL Right 7/13/2020    Procedure: Open right inguinal hernia repair;  Surgeon: Ed Hackett MD;  Location:  OR     LAPAROSCOPIC HERNIORRHAPHY INGUINAL Right 12/17/2018    Procedure: Laparoscopic Right Inguinal Hernia Repair;  Surgeon: Skylar Dudley MD;  Location:  OR        Family History:    Family History   Problem Relation Age of Onset     No Known Problems Mother      Diabetes Father      Unknown/Adopted Maternal Grandmother      Unknown/Adopted Maternal Grandfather      Unknown/Adopted Paternal Grandmother      Unknown/Adopted Paternal Grandfather      No Known Problems Brother      No Known Problems Sister      No Known Problems Daughter      No Known Problems Sister        Social History:  Marital Status:   [2]  Social History     Tobacco Use     Smoking status: Never Smoker     Smokeless tobacco: Never Used   Substance Use Topics     Alcohol use: Yes     Comment: occasional     Drug use: No        Medications:    dexamethasone (DECADRON) 6 MG tablet  ibuprofen (ADVIL/MOTRIN) 200 MG capsule  dicyclomine (BENTYL) 10 MG capsule  loperamide (IMODIUM) 2 MG capsule  oxyCODONE-acetaminophen (PERCOCET) 5-325 MG tablet          Review of Systems all other systems are reviewed and are negative.    Physical Exam   BP: 131/84  Pulse: 84  Temp: 98.8  F (37.1  C)  Resp: 18  SpO2: 98 %      Physical Exam General alert cooperative male in mild to moderate stress.  HEENT reveals the ears to be clear.  Eyes show no injection.  He has nasal mucosal swelling.  There are some postnasal drip but no tonsillar hypertrophy.  Speech is clear.  Neck is supple without stridor.  No adenopathy.  Lungs were clear except a rare crackle at the base.  No wheezes.  Cardiac auscultation is normal.  Abdomen is benign.  No leg pain or swelling    ED Course                 Procedures              EKG Interpretation:      Interpreted by Karl Wagner MD  Time reviewed: 19:30  Symptoms at time of EKG: cp   Rhythm: normal sinus   Rate: Normal  Axis: Normal  Ectopy: none  Conduction: RSR in V1  ST Segments/ T Waves: No acute ischemic changes  Q Waves: none  Comparison to prior: Unchanged from 12/15    Clinical Impression: normal EKG                Critical Care time:  none               Results for orders  placed or performed during the hospital encounter of 01/02/22 (from the past 24 hour(s))   XR Chest Port 1 View    Narrative    EXAM: XR CHEST PORT 1 VIEW  LOCATION: [facility]  DATE/TIME: 1/2/2022 8:09 PM    INDICATION: Congestion and cough  COMPARISON: None.      Impression    IMPRESSION: Heart size and pulmonary vessels normal. Slight linear densities radiating out from each alisha suggests minimal atelectasis or slight fibrotic band. No focal pneumonia identified.   Symptomatic; Yes; 12/24/2021 Influenza A/B & SARS-CoV2 (COVID-19) Virus PCR Multiplex Nasopharyngeal    Specimen: Nasopharyngeal; Swab   Result Value Ref Range    Influenza A PCR Negative Negative    Influenza B PCR Negative Negative    SARS CoV2 PCR Positive (A) Negative    Narrative    Testing was performed using the isaac SARS-CoV-2 & Influenza A/B Assay on the isaac Ashley System. This test should be ordered for the detection of SARS-CoV-2 and influenza viruses in individuals who meet clinical and/or epidemiological criteria. Test performance is unknown in asymptomatic patients. This test is for in vitro diagnostic use under the FDA EUA for laboratories certified under CLIA to perform moderate and/or high complexity testing. This test has not been FDA cleared or approved. A negative result does not rule out the presence of PCR inhibitors in the specimen or target RNA in concentration below the limit of detection for the assay. If only one viral target is positive but coinfection with multiple targets is suspected, the sample should be re-tested with another FDA cleared, approved or authorized test, if coinfection would change clinical management. [facility] are certified under the Clinical Laboratory Improvement Amendments of 1988 (CLIA-88) as  qualified to perform moderate and/or high complexity laboratory testing.       Medications   dexamethasone (DECADRON) tablet 6 mg (has no administration in  time range)       Assessments & Plan (with Medical Decision Making)   Dustin Mccoy is a 46 year old male who presents with 2-week history of congestion and cough.  Cough has been clear.  Developed fever over the last few days.  Chest pain with coughing.  No exertional chest pain.  Feels more short of breath today.  No nausea or vomiting.  No diarrhea.  Denies any ear pain.  Congestion and  loss of taste and smell.  Sore throat earlier but that is resolved.  Never smoked.  History of bronchitis but no history of pneumonia.  No history of asthma.  His wife and son had similar symptoms for 2 weeks.  They were tested at [facility] and were negative for Covid.  Patient has not taken anything for his symptoms prior to arrival today.  Has not been vaccinated for Covid or for influenza.  Presentation patient is afebrile not hypoxic.  On exam he had nasal congestion with postnasal drip.  He also had some adventitious crackles at the lung bases without wheezes.  Covid was positive.  Influenza was negative.Chest x-ray showed no acute abnormality as noted above.  I have reviewed the nursing notes.    I have reviewed the findings, diagnosis, plan and need for follow up with the patient.       New Prescriptions    DEXAMETHASONE (DECADRON) 6 MG TABLET    Take 1 tablet (6 mg) by mouth daily for 5 doses       Final diagnoses:   Infection due to 2019 novel coronavirus       1/2/2022   [facility] EMERGENCY DEPT     Karl Wagner MD  01/02/22 5323

## 2022-01-03 NOTE — DISCHARGE INSTRUCTIONS
Your Covid test was positive.  Your x-ray did not show any acute infiltrate.  Take the Decadron as directed daily for 6 days.  First dose here in the department  Oximetry to monitor how well your lungs are working.  If you drop below 90% for a period of time you should return to the ER for reassessment.

## 2022-01-23 ENCOUNTER — HEALTH MAINTENANCE LETTER (OUTPATIENT)
Age: 47
End: 2022-01-23

## 2022-07-22 ENCOUNTER — HOSPITAL ENCOUNTER (EMERGENCY)
Facility: CLINIC | Age: 47
Discharge: HOME | End: 2022-07-22
Attending: EMERGENCY MEDICINE | Admitting: EMERGENCY MEDICINE
Payer: COMMERCIAL

## 2022-07-22 ENCOUNTER — APPOINTMENT (OUTPATIENT)
Dept: GENERAL RADIOLOGY | Facility: CLINIC | Age: 47
End: 2022-07-22
Attending: EMERGENCY MEDICINE
Payer: COMMERCIAL

## 2022-07-22 VITALS
OXYGEN SATURATION: 98 % | HEIGHT: 70 IN | BODY MASS INDEX: 33.93 KG/M2 | WEIGHT: 237 LBS | DIASTOLIC BLOOD PRESSURE: 72 MMHG | HEART RATE: 65 BPM | RESPIRATION RATE: 20 BRPM | SYSTOLIC BLOOD PRESSURE: 121 MMHG | TEMPERATURE: 98.8 F

## 2022-07-22 DIAGNOSIS — I95.1 ORTHOSTATIC HYPOTENSION: ICD-10-CM

## 2022-07-22 DIAGNOSIS — R05.8 PRODUCTIVE COUGH: ICD-10-CM

## 2022-07-22 LAB
ALBUMIN SERPL-MCNC: 3.5 G/DL (ref 3.4–5)
ALP SERPL-CCNC: 53 U/L (ref 40–150)
ALT SERPL W P-5'-P-CCNC: 40 U/L (ref 0–70)
ANION GAP SERPL CALCULATED.3IONS-SCNC: 4 MMOL/L (ref 3–14)
AST SERPL W P-5'-P-CCNC: 19 U/L (ref 0–45)
BASOPHILS # BLD AUTO: 0 10E3/UL (ref 0–0.2)
BASOPHILS NFR BLD AUTO: 0 %
BILIRUB SERPL-MCNC: 0.7 MG/DL (ref 0.2–1.3)
BUN SERPL-MCNC: 15 MG/DL (ref 7–30)
CALCIUM SERPL-MCNC: 8.5 MG/DL (ref 8.5–10.1)
CHLORIDE BLD-SCNC: 106 MMOL/L (ref 94–109)
CO2 SERPL-SCNC: 28 MMOL/L (ref 20–32)
CREAT SERPL-MCNC: 1.15 MG/DL (ref 0.66–1.25)
CRP SERPL-MCNC: 7.1 MG/L (ref 0–8)
EOSINOPHIL # BLD AUTO: 0.1 10E3/UL (ref 0–0.7)
EOSINOPHIL NFR BLD AUTO: 1 %
ERYTHROCYTE [DISTWIDTH] IN BLOOD BY AUTOMATED COUNT: 13.2 % (ref 10–15)
FLUAV RNA SPEC QL NAA+PROBE: NEGATIVE
FLUBV RNA RESP QL NAA+PROBE: NEGATIVE
GFR SERPL CREATININE-BSD FRML MDRD: 78 ML/MIN/1.73M2
GLUCOSE BLD-MCNC: 92 MG/DL (ref 70–99)
HCT VFR BLD AUTO: 42.5 % (ref 40–53)
HGB BLD-MCNC: 14.6 G/DL (ref 13.3–17.7)
IMM GRANULOCYTES # BLD: 0 10E3/UL
IMM GRANULOCYTES NFR BLD: 0 %
LYMPHOCYTES # BLD AUTO: 0.9 10E3/UL (ref 0.8–5.3)
LYMPHOCYTES NFR BLD AUTO: 11 %
MCH RBC QN AUTO: 30.5 PG (ref 26.5–33)
MCHC RBC AUTO-ENTMCNC: 34.4 G/DL (ref 31.5–36.5)
MCV RBC AUTO: 89 FL (ref 78–100)
MONOCYTES # BLD AUTO: 0.9 10E3/UL (ref 0–1.3)
MONOCYTES NFR BLD AUTO: 11 %
NEUTROPHILS # BLD AUTO: 6.4 10E3/UL (ref 1.6–8.3)
NEUTROPHILS NFR BLD AUTO: 77 %
NRBC # BLD AUTO: 0 10E3/UL
NRBC BLD AUTO-RTO: 0 /100
PLATELET # BLD AUTO: 196 10E3/UL (ref 150–450)
POTASSIUM BLD-SCNC: 3.8 MMOL/L (ref 3.4–5.3)
PROT SERPL-MCNC: 7.2 G/DL (ref 6.8–8.8)
RBC # BLD AUTO: 4.79 10E6/UL (ref 4.4–5.9)
SARS-COV-2 RNA RESP QL NAA+PROBE: NEGATIVE
SODIUM SERPL-SCNC: 138 MMOL/L (ref 133–144)
TROPONIN I SERPL HS-MCNC: 6 NG/L
WBC # BLD AUTO: 8.3 10E3/UL (ref 4–11)

## 2022-07-22 PROCEDURE — 80053 COMPREHEN METABOLIC PANEL: CPT | Performed by: EMERGENCY MEDICINE

## 2022-07-22 PROCEDURE — 71045 X-RAY EXAM CHEST 1 VIEW: CPT

## 2022-07-22 PROCEDURE — 84484 ASSAY OF TROPONIN QUANT: CPT | Performed by: EMERGENCY MEDICINE

## 2022-07-22 PROCEDURE — 96361 HYDRATE IV INFUSION ADD-ON: CPT | Performed by: EMERGENCY MEDICINE

## 2022-07-22 PROCEDURE — 93010 ELECTROCARDIOGRAM REPORT: CPT | Performed by: EMERGENCY MEDICINE

## 2022-07-22 PROCEDURE — 86140 C-REACTIVE PROTEIN: CPT | Performed by: EMERGENCY MEDICINE

## 2022-07-22 PROCEDURE — 87636 SARSCOV2 & INF A&B AMP PRB: CPT | Performed by: EMERGENCY MEDICINE

## 2022-07-22 PROCEDURE — 82040 ASSAY OF SERUM ALBUMIN: CPT | Performed by: EMERGENCY MEDICINE

## 2022-07-22 PROCEDURE — 85025 COMPLETE CBC W/AUTO DIFF WBC: CPT | Performed by: EMERGENCY MEDICINE

## 2022-07-22 PROCEDURE — 36415 COLL VENOUS BLD VENIPUNCTURE: CPT | Performed by: EMERGENCY MEDICINE

## 2022-07-22 PROCEDURE — 258N000003 HC RX IP 258 OP 636: Performed by: EMERGENCY MEDICINE

## 2022-07-22 PROCEDURE — C9803 HOPD COVID-19 SPEC COLLECT: HCPCS | Performed by: EMERGENCY MEDICINE

## 2022-07-22 PROCEDURE — 99285 EMERGENCY DEPT VISIT HI MDM: CPT | Mod: CS | Performed by: EMERGENCY MEDICINE

## 2022-07-22 PROCEDURE — 96360 HYDRATION IV INFUSION INIT: CPT | Performed by: EMERGENCY MEDICINE

## 2022-07-22 PROCEDURE — 99285 EMERGENCY DEPT VISIT HI MDM: CPT | Mod: CS,25 | Performed by: EMERGENCY MEDICINE

## 2022-07-22 PROCEDURE — 93005 ELECTROCARDIOGRAM TRACING: CPT | Performed by: EMERGENCY MEDICINE

## 2022-07-22 RX ORDER — BENZONATATE 200 MG/1
200 CAPSULE ORAL 3 TIMES DAILY PRN
Qty: 20 CAPSULE | Refills: 0 | Status: SHIPPED | OUTPATIENT
Start: 2022-07-22

## 2022-07-22 RX ORDER — SODIUM CHLORIDE 9 MG/ML
INJECTION, SOLUTION INTRAVENOUS CONTINUOUS
Status: DISCONTINUED | OUTPATIENT
Start: 2022-07-22 | End: 2022-07-22 | Stop reason: HOSPADM

## 2022-07-22 RX ORDER — DOXYCYCLINE 100 MG/1
100 CAPSULE ORAL 2 TIMES DAILY
Qty: 14 CAPSULE | Refills: 0 | Status: SHIPPED | OUTPATIENT
Start: 2022-07-22 | End: 2022-07-29

## 2022-07-22 RX ORDER — LOSARTAN POTASSIUM 100 MG/1
1 TABLET ORAL EVERY MORNING
COMMUNITY
Start: 2022-07-12

## 2022-07-22 RX ADMIN — SODIUM CHLORIDE 1000 ML: 9 INJECTION, SOLUTION INTRAVENOUS at 12:03

## 2022-07-22 RX ADMIN — SODIUM CHLORIDE 1000 ML: 9 INJECTION, SOLUTION INTRAVENOUS at 13:01

## 2022-07-22 NOTE — ED TRIAGE NOTES
Pt presents with concerns of near syncopal episode.  Pt states that he had a near fainting episode.   Pt checked his oxygen saturations, states that his sats were in the 70's.  Pt states that he had covid in January.  Pt has had a cough since Monday.  Pt states that he is coughing orange/green phlegm.  Pt states that he did a home covid test, negative.      Triage Assessment     Row Name 07/22/22 1046       Triage Assessment (Adult)    Airway WDL WDL       Respiratory WDL    Respiratory WDL WDL       Skin Circulation/Temperature WDL    Skin Circulation/Temperature WDL WDL       Cardiac WDL    Cardiac WDL WDL       Peripheral/Neurovascular WDL    Peripheral Neurovascular WDL WDL       Cognitive/Neuro/Behavioral WDL    Cognitive/Neuro/Behavioral WDL WDL

## 2022-07-22 NOTE — DISCHARGE INSTRUCTIONS
Your lab work and chest x-ray did not show any acute concerns.  EKG was normal    Your symptoms may be due to a low blood pressure.  You were given some fluids which seemed to help.  For the next few days I would recommend holding your blood pressure medicine so that it does not drop anymore.  You may need to restart at 1/2 tablet and make sure that it does not drop your pressure too much.  You may also want to talk to your primary provider before resuming    Drink plenty of fluids and get plenty of rest    An antibiotic was sent to the pharmacy for you to start to help with this ongoing productive cough.  May be related to underlying bronchitis.    You were also given a prescription for cough medication.  You may take this up to 3 times daily as needed to help with your symptoms    If any new or concerning symptoms develop do not hesitate to return to the emergency room for evaluation

## 2022-07-22 NOTE — ED PROVIDER NOTES
History     Chief Complaint   Patient presents with     Shortness of Breath     HPI  Dustin Mccoy is a 47 year old male who presents to the emergency room for near syncope.  He said that he was getting his haircut today and when he stood up from the chair he nearly passed out.  He was just across the street from work, and he went into the 's department and had a coworker check his oxygen saturation.  Was in the 70s.  Put oxygen on for a bit and felt a little bit better, but was still somewhat lightheaded so came to the emergency room for further evaluation.  Says for the last few days he has had a productive cough, green sputum, but has not been running a fever.  Has not been having chest pain.  Does not describe any dizziness or room spinning.  Has not been vomiting.  Says that he ran out of his blood pressure medication recently and skipped it for short time, but started up the last 2 days.    Allergies:  Allergies   Allergen Reactions     Azithromycin Hives     Bactrim [Sulfamethoxazole W/Trimethoprim]      Erythromycin      Sulfa Drugs        Problem List:    Patient Active Problem List    Diagnosis Date Noted     Recurrent right inguinal hernia 06/03/2020     Priority: Medium     Added automatically from request for surgery [id number]       Right inguinal pain 06/03/2020     Priority: Medium     Added automatically from request for surgery [id number]       Prostate hypertrophy 01/25/2018     Priority: Medium     H/O irritable bowel syndrome 12/28/2015     Priority: Medium        Past Medical History:    Past Medical History:   Diagnosis Date     Atypical chest pain 12/28/2015     H/O irritable bowel syndrome 12/28/2015       Past Surgical History:    Past Surgical History:   Procedure Laterality Date     HERNIORRHAPHY INGUINAL Right 7/13/2020    Procedure: Open right inguinal hernia repair;  Surgeon: Ed Hackett MD;  Location: [facility] OR     LAPAROSCOPIC HERNIORRHAPHY INGUINAL Right 12/17/2018     "Procedure: Laparoscopic Right Inguinal Hernia Repair;  Surgeon: Skylar Dudley MD;  Location: [facility] OR       Family History:    Family History   Problem Relation Age of Onset     No Known Problems Mother      Diabetes Father      Unknown/Adopted Maternal Grandmother      Unknown/Adopted Maternal Grandfather      Unknown/Adopted Paternal Grandmother      Unknown/Adopted Paternal Grandfather      No Known Problems Brother      No Known Problems Sister      No Known Problems Daughter      No Known Problems Sister        Social History:  Marital Status:   [2]  Social History     Tobacco Use     Smoking status: Never Smoker     Smokeless tobacco: Never Used   Substance Use Topics     Alcohol use: Yes     Comment: occasional     Drug use: No        Medications:    benzonatate (TESSALON) 200 MG capsule  doxycycline hyclate (VIBRAMYCIN) 100 MG capsule  losartan (COZAAR) 100 MG tablet  ibuprofen (ADVIL/MOTRIN) 200 MG capsule  loperamide (IMODIUM) 2 MG capsule  oxyCODONE-acetaminophen (PERCOCET) 5-325 MG tablet          Review of Systems   All other systems reviewed and are negative.      Physical Exam   BP: 95/66  Pulse: 74  Temp: 98.8  F (37.1  C)  Resp: 20  Height: 177.8 cm (5' 10\")  Weight: 107.5 kg (237 lb)  SpO2: 98 %  Lying Orthostatic BP: 99/57  Lying Orthostatic Pulse: 61 bpm  Sitting Orthostatic BP: 93/57  Sitting Orthostatic Pulse: 71 bpm  Standing Orthostatic BP: 74/44  Standing Orthostatic Pulse: 73 bpm      Physical Exam  Vitals and nursing note reviewed.   Constitutional:       General: He is not in acute distress.     Appearance: He is not diaphoretic.   HENT:      Head: Atraumatic.   Eyes:      General: No scleral icterus.     Extraocular Movements: Extraocular movements intact.      Pupils: Pupils are equal, round, and reactive to light.   Cardiovascular:      Rate and Rhythm: Normal rate and regular rhythm.      Heart sounds: Normal heart sounds.   Pulmonary:      Effort: Pulmonary effort is normal. No " respiratory distress.      Breath sounds: Normal breath sounds.   Abdominal:      General: Bowel sounds are normal.      Palpations: Abdomen is soft.      Tenderness: There is no abdominal tenderness.   Musculoskeletal:         General: No tenderness.      Right lower leg: No edema.      Left lower leg: No edema.   Skin:     General: Skin is warm.      Findings: No rash.   Neurological:      General: No focal deficit present.      Mental Status: He is alert.         ED Course                 Procedures              EKG Interpretation:      Interpreted by Treva Segovia DO  Time reviewed: 1130  Symptoms at time of EKG: Near syncope   Rhythm: normal sinus   Rate: normal  Axis: normal  Ectopy: none  Conduction: normal  ST Segments/ T Waves: No ST-T wave changes  Q Waves: none  Comparison to prior: Unchanged    Clinical Impression: normal EKG          Critical Care time:  none               Results for orders placed or performed during the hospital encounter of 07/22/22 (from the past 24 hour(s))   CBC with platelets differential    Narrative    The following orders were created for panel order CBC with platelets differential.  Procedure                               Abnormality         Status                     ---------                               -----------         ------                     CBC with platelets and d...[[id number]]                      Final result                 Please view results for these tests on the individual orders.   Comprehensive metabolic panel   Result Value Ref Range    Sodium 138 133 - 144 mmol/L    Potassium 3.8 3.4 - 5.3 mmol/L    Chloride 106 94 - 109 mmol/L    Carbon Dioxide (CO2) 28 20 - 32 mmol/L    Anion Gap 4 3 - 14 mmol/L    Urea Nitrogen 15 7 - 30 mg/dL    Creatinine 1.15 0.66 - 1.25 mg/dL    Calcium 8.5 8.5 - 10.1 mg/dL    Glucose 92 70 - 99 mg/dL    Alkaline Phosphatase 53 40 - 150 U/L    AST 19 0 - 45 U/L    ALT 40 0 - 70 U/L    Protein Total 7.2 6.8 - 8.8 g/dL     Albumin 3.5 3.4 - 5.0 g/dL    Bilirubin Total 0.7 0.2 - 1.3 mg/dL    GFR Estimate 78 >60 mL/min/1.73m2   Troponin I   Result Value Ref Range    Troponin I High Sensitivity 6 <79 ng/L   Symptomatic; Yes; 7/18/2022 Influenza A/B & SARS-CoV2 (COVID-19) Virus PCR Multiplex Nose    Specimen: Nose; Swab   Result Value Ref Range    Influenza A PCR Negative Negative    Influenza B PCR Negative Negative    SARS CoV2 PCR Negative Negative    Narrative    Testing was performed using the isaac SARS-CoV-2 & Influenza A/B Assay on the isaac Ashley System. This test should be ordered for the detection of SARS-CoV-2 and influenza viruses in individuals who meet clinical and/or epidemiological criteria. Test performance is unknown in asymptomatic patients. This test is for in vitro diagnostic use under the FDA EUA for laboratories certified under CLIA to perform moderate and/or high complexity testing. This test has not been FDA cleared or approved. A negative result does not rule out the presence of PCR inhibitors in the specimen or target RNA in concentration below the limit of detection for the assay. If only one viral target is positive but coinfection with multiple targets is suspected, the sample should be re-tested with another FDA cleared, approved or authorized test, if coinfection would change clinical management. [facility] are certified under the Clinical Laboratory Improvement Amendments of 1988 (CLIA-88) as  qualified to perform moderate and/or high complexity laboratory testing.   CRP inflammation   Result Value Ref Range    CRP Inflammation 7.1 0.0 - 8.0 mg/L   CBC with platelets and differential   Result Value Ref Range    WBC Count 8.3 4.0 - 11.0 10e3/uL    RBC Count 4.79 4.40 - 5.90 10e6/uL    Hemoglobin 14.6 13.3 - 17.7 g/dL    Hematocrit 42.5 40.0 - 53.0 %    MCV 89 78 - 100 fL    MCH 30.5 26.5 - 33.0 pg    MCHC 34.4 31.5 - 36.5 g/dL    RDW 13.2 10.0 - 15.0 %    Platelet Count 196 150 - 450  10e3/uL    % Neutrophils 77 %    % Lymphocytes 11 %    % Monocytes 11 %    % Eosinophils 1 %    % Basophils 0 %    % Immature Granulocytes 0 %    NRBCs per 100 WBC 0 <1 /100    Absolute Neutrophils 6.4 1.6 - 8.3 10e3/uL    Absolute Lymphocytes 0.9 0.8 - 5.3 10e3/uL    Absolute Monocytes 0.9 0.0 - 1.3 10e3/uL    Absolute Eosinophils 0.1 0.0 - 0.7 10e3/uL    Absolute Basophils 0.0 0.0 - 0.2 10e3/uL    Absolute Immature Granulocytes 0.0 <=0.4 10e3/uL    Absolute NRBCs 0.0 10e3/uL   XR Chest Port 1 View    Narrative    CHEST ONE VIEW PORTABLE  7/22/2022 12:31 PM     HISTORY: Productive cough, syncopal episode.    COMPARISON: 1/2/2022.      Impression    IMPRESSION: No acute cardiopulmonary disease.    LATOSHA DAILY MD         SYSTEM ID:  [id number]       Medications   0.9% sodium chloride BOLUS (0 mLs Intravenous Stopped 7/22/22 1255)   0.9% sodium chloride BOLUS (0 mLs Intravenous Stopped 7/22/22 1351)       Assessments & Plan (with Medical Decision Making)  Alfredo is a 47-year-old male presenting to the emergency room with near syncopal episode and cough.  See history and focused physical exam as above  Pleasant 47-year-old male in no acute distress, vital signs are stable but blood pressure is on the low end of normal.  No acute concerning findings on physical exam.  SPECT blood pressure may have caused his near syncopal episode, and may be orthostatic.  Will insert peripheral IV to give IV fluids, and will do lab work, EKG, and chest x-ray.  We will also swab for COVID.  He is agreeable to this plan for work-up  Patient was orthostatic on initial checks.  Had been given 2 L of normal saline total while here in the ER.  Pressures had improved and was no longer orthostatic at that time.  He said that he did feel better.  Labs and imaging as above.  No acute concerning findings to explain the patient's symptoms.  COVID test was negative.  Suspect that he may have underlying bacterial infection causing productive cough.  Will  start on an antibiotic and give cough medicine.  Told him to hold his blood pressure medication for the next few days and to follow-up closely with his primary provider.  Return at any time if symptoms worsen.  He understands and agrees and is discharged in no acute distress     I have reviewed the nursing notes.    I have reviewed the findings, diagnosis, plan and need for follow up with the patient.       Discharge Medication List as of 7/22/2022  2:08 PM      START taking these medications    Details   benzonatate (TESSALON) 200 MG capsule Take 1 capsule (200 mg) by mouth 3 times daily as needed for cough, Disp-20 capsule, R-0, E-Prescribe      doxycycline hyclate (VIBRAMYCIN) 100 MG capsule Take 1 capsule (100 mg) by mouth 2 times daily for 7 days, Disp-14 capsule, R-0, E-Prescribe             Final diagnoses:   Orthostatic hypotension   Productive cough       7/22/2022   [facility] EMERGENCY DEPT     Treva Segovia DO  07/22/22 9838

## 2022-09-11 ENCOUNTER — HEALTH MAINTENANCE LETTER (OUTPATIENT)
Age: 47
End: 2022-09-11

## 2023-01-22 ENCOUNTER — HEALTH MAINTENANCE LETTER (OUTPATIENT)
Age: 48
End: 2023-01-22

## 2023-12-05 NOTE — MR AVS SNAPSHOT
"              After Visit Summary   2017    Dustin Mccoy    MRN: [id number]           Patient Information     Date Of Birth          1975        Visit Information        Provider Department      2017 11:20 AM Jayme Chavez PA-C [facility]        Today's Diagnoses     Olecranon bursitis of left elbow    -  1     Cellulitis of left upper extremity         Cellulitis of left elbow         H/O irritable bowel syndrome            Follow-ups after your visit        Who to contact     If you have questions or need follow up information about today's clinic visit or your schedule please contact [facility] directly at [phone].  Normal or non-critical lab and imaging results will be communicated to you by [employer], letter or phone within 4 business days after the clinic has received the results. If you do not hear from us within 7 days, please contact the clinic through [employer] or phone. If you have a critical or abnormal lab result, we will notify you by phone as soon as possible.  Submit refill requests through [employer] or call your pharmacy and they will forward the refill request to us. Please allow 3 business days for your refill to be completed.          Additional Information About Your Visit        MyChart Information     [employer] lets you send messages to your doctor, view your test results, renew your prescriptions, schedule appointments and more. To sign up, go to [email or web address] . Click on \"Log in\" on the left side of the screen, which will take you to the Welcome page. Then click on \"Sign up Now\" on the right side of the page.     You will be asked to enter the access code listed below, as well as some personal information. Please follow the directions to create your username and password.     Your access code is: [email or web address]  Expires: 2017 10:35 AM     Your access code will  in 90 days. If you need help or a new code, please call your [facility] " clinic or [phone].        Care EveryWhere ID     This is your Care EveryWhere ID. This could be used by other organizations to access your [facility] medical records  [id number]        Your Vitals Were     Pulse Temperature Respirations             76 96.6  F (35.9  C) (Oral) 16          Blood Pressure from Last 3 Encounters:   01/05/17 116/70   01/03/17 121/70   11/02/16 122/80    Weight from Last 3 Encounters:   01/05/17 226 lb 9.6 oz (102.785 kg)   01/02/17 226 lb (102.513 kg)   11/02/16 225 lb 4.8 oz (102.195 kg)              Today, you had the following     No orders found for display         Today's Medication Changes          These changes are accurate as of: 1/5/17 11:34 AM.  If you have any questions, ask your nurse or doctor.               Stop taking these medicines if you haven't already. Please contact your care team if you have questions.     doxycycline Monohydrate 100 MG Caps   Stopped by:  Jayme Chavez PA-C                    Primary Care Provider Office Phone # Fax #    Mamadou Blair [phone]       [facility] [address] [facility] [address]        Thank you!     Thank you for choosing [facility]  for your care. Our goal is always to provide you with excellent care. Hearing back from our patients is one way we can continue to improve our services. Please take a few minutes to complete the written survey that you may receive in the mail after your visit with us. Thank you!             Your Updated Medication List - Protect others around you: Learn how to safely use, store and throw away your medicines at www.disposemymeds.org.          This list is accurate as of: 1/5/17 11:34 AM.  Always use your most recent med list.                   Brand Name Dispense Instructions for use    acetaminophen 325 MG tablet    TYLENOL    100 tablet    Take 2 tablets (650 mg) by mouth every 4 hours as needed for mild pain       cephALEXin 500 MG  capsule    KEFLEX    40 capsule    Take 1 capsule (500 mg) by mouth 4 times daily       ibuprofen 200 MG tablet    ADVIL/MOTRIN    60 tablet    Take 3 tablets (600 mg) by mouth 4 times daily for 5 days Then take every 6 hours as needed, take with food          Render Note In Bullet Format When Appropriate: No Consent: The patient's consent was obtained including but not limited to risks of crusting, scabbing, blistering, scarring, darker or lighter pigmentary change, recurrence, incomplete removal and infection. Show Aperture Variable?: Yes Number Of Freeze-Thaw Cycles: 1 freeze-thaw cycle Post-Care Instructions: I reviewed with the patient in detail post-care instructions. Patient is to wear sunprotection, and avoid picking at any of the treated lesions. Pt may apply Vaseline to crusted or scabbing areas. Detail Level: Simple Duration Of Freeze Thaw-Cycle (Seconds): 6

## 2024-02-18 ENCOUNTER — HEALTH MAINTENANCE LETTER (OUTPATIENT)
Age: 49
End: 2024-02-18

## 2024-07-21 NOTE — TELEPHONE ENCOUNTER
Refill Decision Note   Joselyn Vickey  is requesting a refill authorization.  Brief Assessment and Rationale for Refill:  Approve     Medication Therapy Plan:        Comments:     Note composed:12:48 PM 07/21/2024            Spoke to patient, he needs time to check his schedule. He will call back another day.

## (undated) DEVICE — SU MONOCRYL 4-0 PS-2 18" UND Y496G

## (undated) DEVICE — DRSG GAUZE 2X2" 8042

## (undated) DEVICE — SU VICRYL 3-0 SH 27" UND J416H

## (undated) DEVICE — SU DERMABOND ADVANCED .7ML DNX6

## (undated) DEVICE — DEVICE FIXATION SECURESTRAP TACKER 5MM ABSORB STRAP25

## (undated) DEVICE — DRSG STERI STRIP 1/2X4" R1547

## (undated) DEVICE — SU VICRYL 3-0 TIE 12X18" UND J110T

## (undated) DEVICE — PACK GENERAL LAPAOSCOPY

## (undated) DEVICE — SYR 10ML FINGER CONTROL W/O NDL 309695

## (undated) DEVICE — DRAIN PENROSE 0.25"X18" LATEX FREE GR201

## (undated) DEVICE — SU MONOCRYL 3-0 PS-2 27" Y427H

## (undated) DEVICE — SU PROLENE 2-0 SHDA 36" 8523H

## (undated) DEVICE — ENDO CANNULA 05MM VERSAONE UNIVERSAL UNVCA5STF

## (undated) DEVICE — PACK MINOR PROCEDURE CUSTOM

## (undated) DEVICE — ENDO DISSECTOR BLUNT 05MM 3/PK 173019

## (undated) DEVICE — STOCKING SLEEVE COMPRESSION CALF MED

## (undated) DEVICE — SPONGE KITTNER 31001010

## (undated) DEVICE — PREP CHLORAPREP 26ML TINTED ORANGE  260815

## (undated) DEVICE — ESU ENDO SCISSORS 5MM CVD 5DCS

## (undated) DEVICE — DRAPE LAP TRANSVERSE 29421

## (undated) DEVICE — DRSG PRIMAPORE 03 1/8X6" 66000318

## (undated) DEVICE — GLOVE PROTEXIS W/NEU-THERA 7.5  2D73TE75

## (undated) DEVICE — Device

## (undated) DEVICE — SU VICRYL 0 CT-1 36" J346H

## (undated) DEVICE — CLIP APPLIER ENDO 5MM M/L LIGAMAX EL5ML

## (undated) DEVICE — SOL ADH LIQUID BENZOIN SWAB 0.6ML C1544

## (undated) DEVICE — STPL SKIN 35W APPOSE 8886803712

## (undated) DEVICE — SU DERMABOND ADVANCED .7ML DNX12

## (undated) RX ORDER — FENTANYL CITRATE 50 UG/ML
INJECTION, SOLUTION INTRAMUSCULAR; INTRAVENOUS
Status: DISPENSED
Start: 2020-07-13

## (undated) RX ORDER — LIDOCAINE HYDROCHLORIDE 20 MG/ML
INJECTION, SOLUTION EPIDURAL; INFILTRATION; INTRACAUDAL; PERINEURAL
Status: DISPENSED
Start: 2020-07-13

## (undated) RX ORDER — ONDANSETRON 2 MG/ML
INJECTION INTRAMUSCULAR; INTRAVENOUS
Status: DISPENSED
Start: 2020-07-13

## (undated) RX ORDER — DEXAMETHASONE SODIUM PHOSPHATE 10 MG/ML
INJECTION INTRAMUSCULAR; INTRAVENOUS
Status: DISPENSED
Start: 2018-12-17

## (undated) RX ORDER — LIDOCAINE HYDROCHLORIDE 20 MG/ML
INJECTION, SOLUTION EPIDURAL; INFILTRATION; INTRACAUDAL; PERINEURAL
Status: DISPENSED
Start: 2018-12-17

## (undated) RX ORDER — KETOROLAC TROMETHAMINE 30 MG/ML
INJECTION, SOLUTION INTRAMUSCULAR; INTRAVENOUS
Status: DISPENSED
Start: 2020-07-13

## (undated) RX ORDER — PROPOFOL 10 MG/ML
INJECTION, EMULSION INTRAVENOUS
Status: DISPENSED
Start: 2018-12-17

## (undated) RX ORDER — BUPIVACAINE HYDROCHLORIDE AND EPINEPHRINE 5; 5 MG/ML; UG/ML
INJECTION, SOLUTION EPIDURAL; INTRACAUDAL; PERINEURAL
Status: DISPENSED
Start: 2018-12-17

## (undated) RX ORDER — DEXAMETHASONE SODIUM PHOSPHATE 10 MG/ML
INJECTION, SOLUTION INTRAMUSCULAR; INTRAVENOUS
Status: DISPENSED
Start: 2020-07-13

## (undated) RX ORDER — ONDANSETRON 2 MG/ML
INJECTION INTRAMUSCULAR; INTRAVENOUS
Status: DISPENSED
Start: 2018-12-17

## (undated) RX ORDER — PROPOFOL 10 MG/ML
INJECTION, EMULSION INTRAVENOUS
Status: DISPENSED
Start: 2020-07-13

## (undated) RX ORDER — HYDROMORPHONE HYDROCHLORIDE 1 MG/ML
INJECTION, SOLUTION INTRAMUSCULAR; INTRAVENOUS; SUBCUTANEOUS
Status: DISPENSED
Start: 2018-12-17

## (undated) RX ORDER — FENTANYL CITRATE 50 UG/ML
INJECTION, SOLUTION INTRAMUSCULAR; INTRAVENOUS
Status: DISPENSED
Start: 2018-12-17